# Patient Record
Sex: FEMALE | Race: WHITE | NOT HISPANIC OR LATINO | Employment: OTHER | URBAN - METROPOLITAN AREA
[De-identification: names, ages, dates, MRNs, and addresses within clinical notes are randomized per-mention and may not be internally consistent; named-entity substitution may affect disease eponyms.]

---

## 2017-02-06 ENCOUNTER — HISTORICAL (OUTPATIENT)
Dept: ADMINISTRATIVE | Facility: HOSPITAL | Age: 52
End: 2017-02-06

## 2017-02-06 LAB
% SATURATION: 5 %
FERRITIN SERPL-MCNC: 6 NG/ML (ref 24–162)
IRON: 22 MCG/DL (ref 24–162)
TOTAL IRON BINDING CAPACITY: 414 MCG/DL (ref 177–435)

## 2017-02-13 LAB — BCS RECOMMENDATION EXT: NORMAL

## 2017-02-24 ENCOUNTER — HISTORICAL (OUTPATIENT)
Dept: ADMINISTRATIVE | Facility: HOSPITAL | Age: 52
End: 2017-02-24

## 2017-04-13 ENCOUNTER — HISTORICAL (OUTPATIENT)
Dept: ADMINISTRATIVE | Facility: HOSPITAL | Age: 52
End: 2017-04-13

## 2017-04-13 LAB
% SATURATION: 13 %
ALBUMIN SERPL-MCNC: 3.8 G/DL (ref 3.1–4.7)
ALP SERPL-CCNC: 60 IU/L (ref 40–104)
ALT (SGPT): 14 IU/L (ref 3–33)
AST SERPL-CCNC: 26 IU/L (ref 10–40)
BASOPHILS NFR BLD: 0.1 K/UL (ref 0–0.2)
BASOPHILS NFR BLD: 0.8 %
BILIRUB SERPL-MCNC: 0.8 MG/DL (ref 0.3–1)
BUN SERPL-MCNC: 15 MG/DL (ref 8–20)
CALCIUM SERPL-MCNC: 9 MG/DL (ref 7.7–10.4)
CHLORIDE: 102 MMOL/L (ref 98–110)
CO2 SERPL-SCNC: 26.6 MMOL/L (ref 22.8–31.6)
CREATININE: 1.02 MG/DL (ref 0.6–1.4)
EOSINOPHIL NFR BLD: 0.3 K/UL (ref 0–0.7)
EOSINOPHIL NFR BLD: 4 %
ERYTHROCYTE [DISTWIDTH] IN BLOOD BY AUTOMATED COUNT: 20.3 % (ref 12.5–14.5)
FERRITIN SERPL-MCNC: 59 NG/ML (ref 24–162)
GLUCOSE: 101 MG/DL (ref 70–99)
GRAN #: 4.5 K/UL (ref 1.4–6.5)
GRAN%: 68 %
HCT VFR BLD AUTO: 39.1 % (ref 36–48)
HGB BLD-MCNC: 11.4 G/DL (ref 12–15)
IMMATURE GRANS (ABS): 0 K/UL (ref 0–1)
IMMATURE GRANULOCYTES: 0.5 %
IRON: 40 MCG/DL (ref 24–162)
LYMPH #: 1.2 K/UL (ref 1.2–3.4)
LYMPH%: 18.2 %
MCH RBC QN AUTO: 22.7 PG (ref 25–35)
MCHC RBC AUTO-ENTMCNC: 29.2 G/DL (ref 31–36)
MCV RBC AUTO: 77.7 FL (ref 79–98)
MONO #: 0.6 K/UL (ref 0.1–0.6)
MONO%: 8.5 %
NUCLEATED RBCS: 0 %
NUCLEATED RED BLOOD CELLS: 0 /100 WBC
PERFORMED BY:: ABNORMAL
PLATELET # BLD AUTO: 185 K/UL (ref 140–440)
PMV BLD AUTO: 11.1 FL (ref 8.8–12.7)
POTASSIUM SERPL-SCNC: 4.2 MMOL/L (ref 3.5–5)
PROT SERPL-MCNC: 6.8 G/DL (ref 6–8.2)
RBC # BLD AUTO: 5.03 M/UL (ref 3.5–5.5)
SODIUM: 137 MMOL/L (ref 134–144)
TOTAL IRON BINDING CAPACITY: 315 MCG/DL (ref 177–435)
WBC # BLD: 6.6 K/UL (ref 5–10)

## 2017-05-04 DIAGNOSIS — D50.0 IRON DEFICIENCY ANEMIA DUE TO CHRONIC BLOOD LOSS: ICD-10-CM

## 2017-05-04 DIAGNOSIS — D50.0 IRON DEFICIENCY ANEMIA SECONDARY TO BLOOD LOSS (CHRONIC): ICD-10-CM

## 2017-05-04 RX ORDER — SODIUM CHLORIDE 0.9 % (FLUSH) 0.9 %
10 SYRINGE (ML) INJECTION
Status: CANCELLED | OUTPATIENT
Start: 2017-05-11

## 2017-05-04 RX ORDER — HEPARIN 100 UNIT/ML
500 SYRINGE INTRAVENOUS
Status: CANCELLED | OUTPATIENT
Start: 2017-05-11

## 2017-05-12 RX ORDER — DIPHENHYDRAMINE HYDROCHLORIDE 50 MG/ML
25 INJECTION INTRAMUSCULAR; INTRAVENOUS
Status: CANCELLED
Start: 2017-05-12

## 2017-05-31 RX ORDER — FERROUS SULFATE 325(65) MG
325 TABLET ORAL
COMMUNITY
End: 2018-12-06

## 2017-05-31 RX ORDER — ALBUTEROL SULFATE 90 UG/1
2 AEROSOL, METERED RESPIRATORY (INHALATION) EVERY 6 HOURS PRN
COMMUNITY
End: 2017-10-10

## 2017-05-31 RX ORDER — GABAPENTIN 300 MG/1
300 CAPSULE ORAL DAILY
COMMUNITY
End: 2017-10-10

## 2017-05-31 RX ORDER — CLONIDINE HYDROCHLORIDE 0.1 MG/1
0.1 TABLET ORAL 3 TIMES DAILY
COMMUNITY

## 2017-05-31 RX ORDER — TEMAZEPAM 30 MG/1
30 CAPSULE ORAL NIGHTLY PRN
COMMUNITY
End: 2017-10-10

## 2017-05-31 RX ORDER — AMLODIPINE BESYLATE 10 MG/1
10 TABLET ORAL DAILY
COMMUNITY
End: 2020-10-27 | Stop reason: SDUPTHER

## 2017-06-05 LAB — CRC RECOMMENDATION EXT: NORMAL

## 2017-06-21 ENCOUNTER — OFFICE VISIT (OUTPATIENT)
Dept: HEMATOLOGY/ONCOLOGY | Facility: CLINIC | Age: 52
End: 2017-06-21
Payer: MEDICAID

## 2017-06-21 ENCOUNTER — HISTORICAL (OUTPATIENT)
Dept: ADMINISTRATIVE | Facility: HOSPITAL | Age: 52
End: 2017-06-21

## 2017-06-21 VITALS
SYSTOLIC BLOOD PRESSURE: 163 MMHG | TEMPERATURE: 98 F | DIASTOLIC BLOOD PRESSURE: 134 MMHG | HEART RATE: 94 BPM | RESPIRATION RATE: 18 BRPM | BODY MASS INDEX: 37.92 KG/M2 | WEIGHT: 249.38 LBS

## 2017-06-21 DIAGNOSIS — D50.0 IRON DEFICIENCY ANEMIA SECONDARY TO BLOOD LOSS (CHRONIC): ICD-10-CM

## 2017-06-21 DIAGNOSIS — D50.0 IRON DEFICIENCY ANEMIA DUE TO CHRONIC BLOOD LOSS: Primary | ICD-10-CM

## 2017-06-21 LAB
% SATURATION: 22 %
ALBUMIN SERPL-MCNC: 3.9 G/DL (ref 3.1–4.7)
ALP SERPL-CCNC: 68 IU/L (ref 40–104)
ALT (SGPT): 29 IU/L (ref 3–33)
AST SERPL-CCNC: 26 IU/L (ref 10–40)
BASOPHILS NFR BLD: 0.1 K/UL (ref 0–0.2)
BASOPHILS NFR BLD: 0.9 %
BILIRUB SERPL-MCNC: 0.4 MG/DL (ref 0.3–1)
BUN SERPL-MCNC: 12 MG/DL (ref 8–20)
CALCIUM SERPL-MCNC: 9.6 MG/DL (ref 7.7–10.4)
CHLORIDE: 103 MMOL/L (ref 98–110)
CO2 SERPL-SCNC: 27.5 MMOL/L (ref 22.8–31.6)
CREATININE: 1.05 MG/DL (ref 0.6–1.4)
EOSINOPHIL NFR BLD: 0.2 K/UL (ref 0–0.7)
EOSINOPHIL NFR BLD: 4.4 %
ERYTHROCYTE [DISTWIDTH] IN BLOOD BY AUTOMATED COUNT: 16.9 % (ref 12.5–14.5)
FERRITIN SERPL-MCNC: 70 NG/ML (ref 24–162)
GLUCOSE: 114 MG/DL (ref 70–99)
GRAN #: 3.4 K/UL (ref 1.4–6.5)
GRAN%: 62.4 %
HCT VFR BLD AUTO: 44.4 % (ref 36–48)
HGB BLD-MCNC: 14.1 G/DL (ref 12–15)
IMMATURE GRANS (ABS): 0 K/UL (ref 0–1)
IMMATURE GRANULOCYTES: 0.2 %
IRON: 60 MCG/DL (ref 24–162)
LYMPH #: 1.3 K/UL (ref 1.2–3.4)
LYMPH%: 23.2 %
MCH RBC QN AUTO: 26.4 PG (ref 25–35)
MCHC RBC AUTO-ENTMCNC: 31.8 G/DL (ref 31–36)
MCV RBC AUTO: 83.1 FL (ref 79–98)
MONO #: 0.5 K/UL (ref 0.1–0.6)
MONO%: 8.9 %
NUCLEATED RBCS: 0 %
NUCLEATED RED BLOOD CELLS: 0 /100 WBC
PERFORMED BY:: ABNORMAL
PLATELET # BLD AUTO: 188 K/UL (ref 140–440)
PMV BLD AUTO: 9.1 FL (ref 8.8–12.7)
POTASSIUM SERPL-SCNC: 3.9 MMOL/L (ref 3.5–5)
PROT SERPL-MCNC: 7.3 G/DL (ref 6–8.2)
RBC # BLD AUTO: 5.34 M/UL (ref 3.5–5.5)
SODIUM: 140 MMOL/L (ref 134–144)
TOTAL IRON BINDING CAPACITY: 276 MCG/DL (ref 177–435)
WBC # BLD: 5.5 K/UL (ref 5–10)

## 2017-06-21 PROCEDURE — 99213 OFFICE O/P EST LOW 20 MIN: CPT | Mod: ,,, | Performed by: INTERNAL MEDICINE

## 2017-06-21 RX ORDER — OMEPRAZOLE 10 MG/1
10 CAPSULE, DELAYED RELEASE ORAL DAILY
COMMUNITY
End: 2018-12-06

## 2017-06-21 NOTE — PROGRESS NOTES
[facility] Hematology/Oncology            PROGRESS NOTE      Subjective:       Patient ID:   NAME: Martita San : 1965     51 y.o. female    Referring Doc: Benjamin Okeefe MD  Other Physicians: Jared Schneider Ahmad    Chief Complaint:  Anemia f/u    History of Present Illness:     Patient returns today for a regularly scheduled follow-up visit.  The patient has endoscopies with Dr Schneider and i need the reports still. She is still having some fatigue. No bleeding or BRBPR. No CP, SOB, Ha's or current V. Mild occasional nausea            ROS:   GEN: normal without any fever, night sweats or weight loss  HEENT: normal with no HA's, sore throat, stiff neck, changes in vision  CV: normal with no CP, SOB, PND, ANTON or orthopnea  PULM: normal with no SOB, cough, hemoptysis, sputum or pleuritic pain  GI: normal with no abdominal pain, nausea, vomiting, constipation, diarrhea, melanotic stools, BRBPR, or hematemesis  : normal with no hematuria, dysuria  BREAST: normal with no mass, discharge, pain  SKIN: normal with no rash, erythema, bruising, or swelling    Allergies:  Review of patient's allergies indicates:   Allergen Reactions    Pcn [penicillins] Anaphylaxis    Benadryl allergy decongestant     Influenza virus vaccines     Latex, natural rubber Hives    Oxycodone        Medications:    Current Outpatient Prescriptions:     alprazolam (XANAX) 1 MG tablet, Take 1 mg by mouth 3 (three) times daily as needed for Anxiety., Disp: , Rfl:     amlodipine (NORVASC) 5 MG tablet, Take 5 mg by mouth once daily., Disp: , Rfl:     apixaban (ELIQUIS) 5 mg Tab, Take 5 mg by mouth 2 (two) times daily., Disp: , Rfl:     cloNIDine (CATAPRES) 0.1 MG tablet, Take 0.1 mg by mouth 3 (three) times daily., Disp: , Rfl:     lisinopril (PRINIVIL,ZESTRIL) 30 MG tablet, Take 1.5 tablets (45 mg total) by mouth once daily., Disp: 30 tablet, Rfl: 0    metoprolol succinate (TOPROL-XL) 50 MG 24 hr tablet, Take 50 mg by mouth  every evening. , Disp: , Rfl:     omeprazole (PRILOSEC) 10 MG capsule, Take 10 mg by mouth once daily., Disp: , Rfl:     albuterol 90 mcg/actuation inhaler, Inhale 2 puffs into the lungs every 6 (six) hours as needed for Wheezing. Rescue, Disp: , Rfl:     escitalopram oxalate (LEXAPRO) 20 MG tablet, Take 20 mg by mouth once daily., Disp: , Rfl:     ferrous sulfate 325 mg (65 mg iron) Tab tablet, Take 325 mg by mouth daily with breakfast., Disp: , Rfl:     gabapentin (NEURONTIN) 300 MG capsule, Take 300 mg by mouth Daily., Disp: , Rfl:     temazepam (RESTORIL) 30 mg capsule, Take 30 mg by mouth nightly as needed for Insomnia., Disp: , Rfl:     PMHx/PSHx Updates:  See patient's last visit with me on  4/13/17        .  See H&P on 3/17/17      Pathology:  None          Objective:     Vitals:  Blood pressure (!) 163/134, pulse 94, temperature 98.1 °F (36.7 °C), resp. rate 18, weight 113.1 kg (249 lb 6.4 oz).    Physical Examination:   GEN: no apparent distress, comfortable; AAOx3  HEAD: atraumatic and normocephalic  EYES: no pallor, no icterus, PERRLA  ENT: OMM, no pharyngeal erythema, external ears WNL; no nasal discharge; no thrush  NECK: no masses, thyroid normal, trachea midline, no LAD/LN's, supple  CV: RRR with no murmur; normal pulse; normal S1 and S2; no pedal edema  CHEST: Normal respiratory effort; CTAB; normal breath sounds; no wheeze or crackles  ABDOM: nontender and nondistended; soft; normal bowel sounds; no rebound/guarding  MUSC/Skeletal: ROM normal; no crepitus; joints normal; no deformities or arthropathy  EXTREM: no clubbing, cyanosis, inflammation or swelling  SKIN: no rashes, lesions, ulcers, petechiae or subcutaneous nodules  : no spivey  NEURO: grossly intact; motor/sensory WNL; AAOx3; no tremors  PSYCH: normal mood, affect and behavior  LYMPH: normal cervical, supraclavicular, axillary and groin LN's            Labs:   Lab Results   Component Value Date    WBC 6.6 04/13/2017    HGB 11.4 (L)  04/13/2017    HCT 39.1 04/13/2017    MCV 73 (L) 12/28/2016     04/13/2017    CMP  Sodium   Date Value Ref Range Status   04/13/2017 137 134 - 144 mmol/L      Potassium   Date Value Ref Range Status   04/13/2017 4.2 3.5 - 5.0 mmol/L      Chloride   Date Value Ref Range Status   04/13/2017 102 98 - 110 mmol/L      CO2   Date Value Ref Range Status   04/13/2017 26.6 22.8 - 31.6 mmol/L      Glucose   Date Value Ref Range Status   04/13/2017 101 (H) 70 - 99 mg/dL      BUN, Bld   Date Value Ref Range Status   04/13/2017 15 8 - 20 mg/dL      Creatinine   Date Value Ref Range Status   04/13/2017 1.02 0.60 - 1.40 mg/dL      Calcium   Date Value Ref Range Status   04/13/2017 9.0 7.7 - 10.4 mg/dL      Total Protein   Date Value Ref Range Status   04/13/2017 6.8 6.0 - 8.2 g/dL      Albumin   Date Value Ref Range Status   04/13/2017 3.8 3.1 - 4.7 g/dL      Total Bilirubin   Date Value Ref Range Status   04/13/2017 0.8 0.3 - 1.0 mg/dL      Alkaline Phosphatase   Date Value Ref Range Status   04/13/2017 60 40 - 104 IU/L      AST   Date Value Ref Range Status   04/13/2017 26 10 - 40 IU/L      ALT   Date Value Ref Range Status   12/26/2016 15 10 - 44 U/L Final     Anion Gap   Date Value Ref Range Status   12/28/2016 7 (L) 8 - 16 mmol/L Final     eGFR if    Date Value Ref Range Status   12/28/2016 >60 >60 mL/min/1.73 m^2 Final     eGFR if non    Date Value Ref Range Status   12/28/2016 >60 >60 mL/min/1.73 m^2 Final     Comment:     Calculation used to obtain the estimated glomerular filtration  rate (eGFR) is the CKD-EPI equation. Since race is unknown   in our information system, the eGFR values for   -American and Non--American patients are given   for each creatinine result.       I have reviewed all available lab results and radiology reports.    Radiology/Diagnostic Studies:        Assessment/Plan:   (1) 51 y.o. female with diagnosis of microcytic anemia  - iron deficiency  anemia  - she had been on oral iron supplements previously and then received IV iron through May 2017  - she has been followed by Dr Schneider with GI  - s/p scopes with hiatal hernia, stomach ulcers and H.pylori positive  - planned capsule endoscopy per patient    (2) chronic pain syndrome with chronic neck and back issues  - followed by Dr Loco with Pain management    (3) HTN - followed by her PCP    (4) fibromyalgia and arthritis - followed by Dr Coleman with Rheum    (5) prior alcohol abuse issues      PLAN:  1. Need the latest endoscopy reports from Dr Schneider  2. She is on prilosec now per GI  3. Planned capsule endoscopy in near future  3. Check up to date labs and iron panel  4. Check labs monthly  5. RTc 3 months  Fax note to Sary Schneider Sedrish, Ahmad      Discussion:     I have explained all of the above in detail and the patient understands all of the current recommendation(s). I have answered all of their questions to the best of my ability and to their complete satisfaction.   The patient is to continue with the current management plan.    RTC in  3 months        Electronically signed by Sukhdeep Rocha MD

## 2017-06-21 NOTE — LETTER
June 21, 2017      Benjamin Okeefe MD  [address] [facility]  [address]  [place] [address]           [facility] Hematology Oncology  [address] [facility] [address]  [address]  [place] [address]  Phone: [phone]  Fax: [phone]          Patient: Martita San   MR Number: [id number]   YOB: 1965   Date of Visit: 6/21/2017       Dear Dr. Benjamin Okeefe:    Thank you for referring Martita San to me for evaluation. Attached you will find relevant portions of my assessment and plan of care.    If you have questions, please do not hesitate to call me. I look forward to following Martita San along with you.    Sincerely,    Sukhdeep Rocha MD    Enclosure  CC:  No Recipients    If you would like to receive this communication electronically, please contact [email or web address] or [phone] to request more information on [employer] access.    For providers and/or their staff who would like to refer a patient to [facility], please contact us through our one-stop-shop provider referral line, [facility] [place], at [phone].    If you feel you have received this communication in error or would no longer like to receive these types of communications, please e-mail [email or web address]

## 2017-06-23 ENCOUNTER — TELEPHONE (OUTPATIENT)
Dept: HEMATOLOGY/ONCOLOGY | Facility: CLINIC | Age: 52
End: 2017-06-23

## 2017-06-23 NOTE — TELEPHONE ENCOUNTER
----- Message from Sukhdeep Rocha MD sent at 6/23/2017  7:38 AM CDT -----  Make sure she has follow-up with Dr Okeefe about her BP      ----- Message -----  From: Reji Coleman MD  Sent: 6/22/2017   9:29 AM  To: Sukhdeep Rocha MD, Benjamin Okeefe MD, #    I got a copy of the note yesterday from this patient having seen Dr. Rocha. Her blood pressure was extremely high and it wasn't certain anything was planned for BP f/u.  Please call the patient and ask her how she is feeling and in addition ask her to contact her primary care doctor today see if he can see her quickly.  Thanks.

## 2017-06-23 NOTE — TELEPHONE ENCOUNTER
Pt notified that she should be receiving a call from Dr Okeefe's office to Schedule a BP check f/u. Pt states she has already been contacted and will be taking care of.

## 2017-06-26 ENCOUNTER — TELEPHONE (OUTPATIENT)
Dept: RHEUMATOLOGY | Facility: CLINIC | Age: 52
End: 2017-06-26

## 2017-06-26 NOTE — TELEPHONE ENCOUNTER
----- Message from Reji Coleman MD sent at 6/22/2017  9:29 AM CDT -----  I got a copy of the note yesterday from this patient having seen Dr. Rocha. Her blood pressure was extremely high and it wasn't certain anything was planned for BP f/u.  Please call the patient and ask her how she is feeling and in addition ask her to contact her primary care doctor today see if he can see her quickly.  Thanks.

## 2017-07-17 LAB
% SATURATION: 17 %
ALBUMIN SERPL-MCNC: 4.1 G/DL (ref 3.1–4.7)
ALP SERPL-CCNC: 56 IU/L (ref 40–104)
ALT (SGPT): 38 IU/L (ref 3–33)
AST SERPL-CCNC: 30 IU/L (ref 10–40)
BASOPHILS NFR BLD: 0.1 K/UL (ref 0–0.2)
BASOPHILS NFR BLD: 0.9 %
BILIRUB SERPL-MCNC: 0.6 MG/DL (ref 0.3–1)
BUN SERPL-MCNC: 24 MG/DL (ref 8–20)
CALCIUM SERPL-MCNC: 9.4 MG/DL (ref 7.7–10.4)
CHLORIDE: 103 MMOL/L (ref 98–110)
CO2 SERPL-SCNC: 29.6 MMOL/L (ref 22.8–31.6)
CREATININE: 0.99 MG/DL (ref 0.6–1.4)
EOSINOPHIL NFR BLD: 0.2 K/UL (ref 0–0.7)
EOSINOPHIL NFR BLD: 3.2 %
ERYTHROCYTE [DISTWIDTH] IN BLOOD BY AUTOMATED COUNT: 15.5 % (ref 12.5–14.5)
FERRITIN SERPL-MCNC: 57 NG/ML (ref 24–162)
GLUCOSE: 103 MG/DL (ref 70–99)
GRAN #: 4.3 K/UL (ref 1.4–6.5)
GRAN%: 65.5 %
HCT VFR BLD AUTO: 40.3 % (ref 36–48)
HGB BLD-MCNC: 13.2 G/DL (ref 12–15)
IMMATURE GRANS (ABS): 0 K/UL (ref 0–1)
IMMATURE GRANULOCYTES: 0.3 %
IRON: 49 MCG/DL (ref 24–162)
LYMPH #: 1.4 K/UL (ref 1.2–3.4)
LYMPH%: 21.3 %
MCH RBC QN AUTO: 28 PG (ref 25–35)
MCHC RBC AUTO-ENTMCNC: 32.8 G/DL (ref 31–36)
MCV RBC AUTO: 85.4 FL (ref 79–98)
MONO #: 0.6 K/UL (ref 0.1–0.6)
MONO%: 8.8 %
NUCLEATED RBCS: 0 %
NUCLEATED RED BLOOD CELLS: 0 /100 WBC
PERFORMED BY:: ABNORMAL
PLATELET # BLD AUTO: 236 K/UL (ref 140–440)
PMV BLD AUTO: 9.4 FL (ref 8.8–12.7)
POTASSIUM SERPL-SCNC: 3.8 MMOL/L (ref 3.5–5)
PROT SERPL-MCNC: 7.2 G/DL (ref 6–8.2)
RBC # BLD AUTO: 4.72 M/UL (ref 3.5–5.5)
SODIUM: 140 MMOL/L (ref 134–144)
TOTAL IRON BINDING CAPACITY: 288 MCG/DL (ref 177–435)
WBC # BLD: 6.6 K/UL (ref 5–10)

## 2017-07-19 ENCOUNTER — TELEPHONE (OUTPATIENT)
Dept: HEMATOLOGY/ONCOLOGY | Facility: CLINIC | Age: 52
End: 2017-07-19

## 2017-07-19 NOTE — TELEPHONE ENCOUNTER
----- Message from Corrie Johnson sent at 7/19/2017 10:30 AM CDT -----  Had BW done Monday Next Door. It is in the lab tab of her chart. Please call patient with results at [phone]. Thanks

## 2017-08-23 ENCOUNTER — HISTORICAL (OUTPATIENT)
Dept: ADMINISTRATIVE | Facility: HOSPITAL | Age: 52
End: 2017-08-23

## 2017-08-23 LAB
% SATURATION: 15 %
ALBUMIN SERPL-MCNC: 4.2 G/DL (ref 3.1–4.7)
ALP SERPL-CCNC: 57 IU/L (ref 40–104)
ALT (SGPT): 27 IU/L (ref 3–33)
AST SERPL-CCNC: 27 IU/L (ref 10–40)
BASOPHILS NFR BLD: 0 K/UL (ref 0–0.2)
BASOPHILS NFR BLD: 0.7 %
BILIRUB SERPL-MCNC: 0.8 MG/DL (ref 0.3–1)
BUN SERPL-MCNC: 14 MG/DL (ref 8–20)
CALCIUM SERPL-MCNC: 9.6 MG/DL (ref 7.7–10.4)
CHLORIDE: 101 MMOL/L (ref 98–110)
CO2 SERPL-SCNC: 30.8 MMOL/L (ref 22.8–31.6)
CREATININE: 0.97 MG/DL (ref 0.6–1.4)
EOSINOPHIL NFR BLD: 0.2 K/UL (ref 0–0.7)
EOSINOPHIL NFR BLD: 3.9 %
ERYTHROCYTE [DISTWIDTH] IN BLOOD BY AUTOMATED COUNT: 13.2 % (ref 12.5–14.5)
FERRITIN SERPL-MCNC: 40 NG/ML (ref 24–162)
GLUCOSE: 91 MG/DL (ref 70–99)
GRAN #: 3.7 K/UL (ref 1.4–6.5)
GRAN%: 65.7 %
HCT VFR BLD AUTO: 42.3 % (ref 36–48)
HGB BLD-MCNC: 13.6 G/DL (ref 12–15)
IMMATURE GRANS (ABS): 0 K/UL (ref 0–1)
IMMATURE GRANULOCYTES: 0.4 %
IRON: 44 MCG/DL (ref 24–162)
LYMPH #: 1.2 K/UL (ref 1.2–3.4)
LYMPH%: 21.5 %
MCH RBC QN AUTO: 28.3 PG (ref 25–35)
MCHC RBC AUTO-ENTMCNC: 32.2 G/DL (ref 31–36)
MCV RBC AUTO: 87.9 FL (ref 79–98)
MONO #: 0.4 K/UL (ref 0.1–0.6)
MONO%: 7.8 %
NUCLEATED RBCS: 0 %
NUCLEATED RED BLOOD CELLS: 0 /100 WBC
PERFORMED BY:: NORMAL
PLATELET # BLD AUTO: 219 K/UL (ref 140–440)
PMV BLD AUTO: 9.3 FL (ref 8.8–12.7)
POTASSIUM SERPL-SCNC: 3.8 MMOL/L (ref 3.5–5)
PROT SERPL-MCNC: 7.3 G/DL (ref 6–8.2)
RBC # BLD AUTO: 4.81 M/UL (ref 3.5–5.5)
SODIUM: 139 MMOL/L (ref 134–144)
TOTAL IRON BINDING CAPACITY: 298 MCG/DL (ref 177–435)
WBC # BLD: 5.6 K/UL (ref 5–10)

## 2017-08-28 ENCOUNTER — TELEPHONE (OUTPATIENT)
Dept: HEMATOLOGY/ONCOLOGY | Facility: CLINIC | Age: 52
End: 2017-08-28

## 2017-08-28 NOTE — TELEPHONE ENCOUNTER
Spoke to patient with lab results. Sees us on 9/20/17 and will check labs again before that visit. Told her iron levels are slightly lower than last time but are still within normal limits

## 2017-09-19 LAB
ALBUMIN SERPL-MCNC: 4.2 G/DL (ref 3.1–4.7)
ALP SERPL-CCNC: 64 IU/L (ref 40–104)
ALT (SGPT): 34 IU/L (ref 3–33)
AST SERPL-CCNC: 30 IU/L (ref 10–40)
BASOPHILS NFR BLD: 0 K/UL (ref 0–0.2)
BASOPHILS NFR BLD: 0.2 %
BILIRUB SERPL-MCNC: 0.5 MG/DL (ref 0.3–1)
BUN SERPL-MCNC: 18 MG/DL (ref 8–20)
CALCIUM SERPL-MCNC: 9.5 MG/DL (ref 7.7–10.4)
CHLORIDE: 106 MMOL/L (ref 98–110)
CO2 SERPL-SCNC: 26 MMOL/L (ref 22.8–31.6)
CREATININE: 0.96 MG/DL (ref 0.6–1.4)
EOSINOPHIL NFR BLD: 0 %
EOSINOPHIL NFR BLD: 0 K/UL (ref 0–0.7)
ERYTHROCYTE [DISTWIDTH] IN BLOOD BY AUTOMATED COUNT: 12.8 % (ref 12.5–14.5)
GLUCOSE: 133 MG/DL (ref 70–99)
GRAN #: 11.6 K/UL (ref 1.4–6.5)
GRAN%: 88.1 %
HCT VFR BLD AUTO: 42.3 % (ref 36–48)
HGB BLD-MCNC: 14 G/DL (ref 12–15)
IMMATURE GRANS (ABS): 0.1 K/UL (ref 0–1)
IMMATURE GRANULOCYTES: 0.8 %
LYMPH #: 0.9 K/UL (ref 1.2–3.4)
LYMPH%: 7.2 %
MCH RBC QN AUTO: 28.2 PG (ref 25–35)
MCHC RBC AUTO-ENTMCNC: 33.1 G/DL (ref 31–36)
MCV RBC AUTO: 85.3 FL (ref 79–98)
MONO #: 0.5 K/UL (ref 0.1–0.6)
MONO%: 3.7 %
NUCLEATED RBCS: 0 %
NUCLEATED RED BLOOD CELLS: 0 /100 WBC
PERFORMED BY:: ABNORMAL
PLATELET # BLD AUTO: 238 K/UL (ref 140–440)
PMV BLD AUTO: 9.5 FL (ref 8.8–12.7)
POTASSIUM SERPL-SCNC: 4 MMOL/L (ref 3.5–5)
PROT SERPL-MCNC: 7.3 G/DL (ref 6–8.2)
RBC # BLD AUTO: 4.96 M/UL (ref 3.5–5.5)
SODIUM: 142 MMOL/L (ref 134–144)
WBC # BLD: 13.1 K/UL (ref 5–10)

## 2017-09-20 ENCOUNTER — OFFICE VISIT (OUTPATIENT)
Dept: HEMATOLOGY/ONCOLOGY | Facility: CLINIC | Age: 52
End: 2017-09-20
Payer: MEDICAID

## 2017-09-20 VITALS
RESPIRATION RATE: 18 BRPM | DIASTOLIC BLOOD PRESSURE: 93 MMHG | TEMPERATURE: 98 F | SYSTOLIC BLOOD PRESSURE: 147 MMHG | BODY MASS INDEX: 36.33 KG/M2 | HEART RATE: 79 BPM | HEIGHT: 68 IN | WEIGHT: 239.69 LBS

## 2017-09-20 DIAGNOSIS — D50.9 HYPOCHROMIC MICROCYTIC ANEMIA: ICD-10-CM

## 2017-09-20 DIAGNOSIS — D50.0 IRON DEFICIENCY ANEMIA SECONDARY TO BLOOD LOSS (CHRONIC): ICD-10-CM

## 2017-09-20 DIAGNOSIS — D50.0 IRON DEFICIENCY ANEMIA DUE TO CHRONIC BLOOD LOSS: Primary | ICD-10-CM

## 2017-09-20 PROCEDURE — 3008F BODY MASS INDEX DOCD: CPT | Mod: ,,, | Performed by: INTERNAL MEDICINE

## 2017-09-20 PROCEDURE — 3077F SYST BP >= 140 MM HG: CPT | Mod: ,,, | Performed by: INTERNAL MEDICINE

## 2017-09-20 PROCEDURE — 3080F DIAST BP >= 90 MM HG: CPT | Mod: ,,, | Performed by: INTERNAL MEDICINE

## 2017-09-20 PROCEDURE — 99213 OFFICE O/P EST LOW 20 MIN: CPT | Mod: ,,, | Performed by: INTERNAL MEDICINE

## 2017-09-20 RX ORDER — HYDROXYZINE PAMOATE 25 MG/1
25 CAPSULE ORAL 4 TIMES DAILY
COMMUNITY
End: 2018-12-06

## 2017-09-20 RX ORDER — PREDNISONE 20 MG/1
20 TABLET ORAL
COMMUNITY
End: 2017-10-10 | Stop reason: ALTCHOICE

## 2017-09-20 NOTE — PROGRESS NOTES
[facility] Hematology/Oncology  PROGRESS NOTE      Subjective:       Patient ID:   NAME: Martita San : 1965     51 y.o. female    Referring Doc: Benjamin Okeefe MD  Other Physicians: Jared Schneider Ahmad    Chief Complaint:  Follow-up and Results (labs in epic)      History of Present Illness:     Patient returns today for a regularly scheduled follow-up visit.  The patient has developed circular red lesions on arms and legs for the past two weeks. She sees Dr Coleman in near future. She denies any CP, SOB, HA's or N/V.             ROS:   GEN: normal without any fever, night sweats or weight loss  HEENT: normal with no HA's, sore throat, stiff neck, changes in vision  CV: normal with no CP, SOB, PND, ANTON or orthopnea  PULM: normal with no SOB, cough, hemoptysis, sputum or pleuritic pain  GI: normal with no abdominal pain, nausea, vomiting, constipation, diarrhea, melanotic stools, BRBPR, or hematemesis  : normal with no hematuria, dysuria  BREAST: normal with no mass, discharge, pain  SKIN: normal with no rash, erythema, bruising, or swelling    Allergies:  Review of patient's allergies indicates:   Allergen Reactions    Pcn [penicillins] Anaphylaxis    Benadryl allergy decongestant     Clindamycin Hives    Influenza virus vaccines     Latex, natural rubber Hives    Oxycodone        Medications:    Current Outpatient Prescriptions:     albuterol 90 mcg/actuation inhaler, Inhale 2 puffs into the lungs every 6 (six) hours as needed for Wheezing. Rescue, Disp: , Rfl:     alprazolam (XANAX) 1 MG tablet, Take 1 mg by mouth 3 (three) times daily as needed for Anxiety., Disp: , Rfl:     amlodipine (NORVASC) 5 MG tablet, Take 5 mg by mouth once daily., Disp: , Rfl:     apixaban (ELIQUIS) 5 mg Tab, Take 5 mg by mouth 2 (two) times daily., Disp: , Rfl:     cloNIDine (CATAPRES) 0.1 MG tablet, Take 0.1 mg by mouth 3 (three) times daily., Disp: , Rfl:     escitalopram oxalate (LEXAPRO) 20 MG tablet, Take  "20 mg by mouth once daily., Disp: , Rfl:     ferrous sulfate 325 mg (65 mg iron) Tab tablet, Take 325 mg by mouth daily with breakfast., Disp: , Rfl:     gabapentin (NEURONTIN) 300 MG capsule, Take 300 mg by mouth Daily., Disp: , Rfl:     hydrOXYzine pamoate (VISTARIL) 25 MG Cap, Take 25 mg by mouth 4 (four) times daily., Disp: , Rfl:     lisinopril (PRINIVIL,ZESTRIL) 30 MG tablet, Take 1.5 tablets (45 mg total) by mouth once daily., Disp: 30 tablet, Rfl: 0    metoprolol succinate (TOPROL-XL) 50 MG 24 hr tablet, Take 50 mg by mouth every evening. , Disp: , Rfl:     omeprazole (PRILOSEC) 10 MG capsule, Take 10 mg by mouth once daily., Disp: , Rfl:     predniSONE (DELTASONE) 20 MG tablet, Take 20 mg by mouth., Disp: , Rfl:     temazepam (RESTORIL) 30 mg capsule, Take 30 mg by mouth nightly as needed for Insomnia., Disp: , Rfl:     PMHx/PSHx Updates:  See patient's last visit with me on 6/21/2017.  See H&P on 3/17/2017        Pathology:  No matching staging information was found for the patient.          Objective:     Vitals:  Blood pressure (!) 147/93, pulse 79, temperature 98.3 °F (36.8 °C), resp. rate 18, height 5' 8" (1.727 m), weight 108.7 kg (239 lb 11.2 oz).    Physical Examination:   GEN: no apparent distress, comfortable; AAOx3  HEAD: atraumatic and normocephalic  EYES: no pallor, no icterus, PERRLA  ENT: OMM, no pharyngeal erythema, external ears WNL; no nasal discharge; no thrush  NECK: no masses, thyroid normal, trachea midline, no LAD/LN's, supple  CV: RRR with no murmur; normal pulse; normal S1 and S2; no pedal edema  CHEST: Normal respiratory effort; CTAB; normal breath sounds; no wheeze or crackles  ABDOM: nontender and nondistended; soft; normal bowel sounds; no rebound/guarding  MUSC/Skeletal: ROM normal; no crepitus; joints normal; no deformities or arthropathy  EXTREM: no clubbing, cyanosis, inflammation or swelling  SKIN: circular red puritic rashes with red raised edges,: no " allyssa  NEURO: grossly intact; motor/sensory WNL; AAOx3; no tremors  PSYCH: normal mood, affect and behavior  LYMPH: normal cervical, supraclavicular, axillary and groin LN's            Labs:   9/19/2017  Lab Results   Component Value Date    WBC 13.1 (H) 09/19/2017    HGB 14.0 09/19/2017    HCT 42.3 09/19/2017    MCV 73 (L) 12/28/2016     09/19/2017     BMP  Lab Results   Component Value Date     09/19/2017    K 4.0 09/19/2017     09/19/2017    CO2 26.0 09/19/2017    BUN 18 09/19/2017    CREATININE 0.96 09/19/2017    CALCIUM 9.5 09/19/2017    ANIONGAP 7 (L) 12/28/2016    ESTGFRAFRICA >60 12/28/2016    EGFRNONAA >60 12/28/2016     Lab Results   Component Value Date    PKWUIHBR89 489 12/27/2016     Lab Results   Component Value Date    FOLATE 8.5 12/27/2016     Lab Results   Component Value Date    IRON 44 08/23/2017    TIBC 298 08/23/2017    FERRITIN 40 08/23/2017               Radiology/Diagnostic Studies:    No results found.    I have reviewed all available lab results and radiology reports.    Assessment/Plan:   (1) 51 y.o. female with diagnosis of microcytic anemia  - iron deficiency anemia  - she had been on oral iron supplements previously and then received IV iron through May 2017  - she has been followed by Dr Schneider with GI  - s/p scopes with hiatal hernia, stomach ulcers and H.pylori positive  - capsule endoscopy per patient was fine  - latest hgb on 9/19 was 14.0 and good  - iron levels adequate currently     (2) chronic pain syndrome with chronic neck and back issues  - followed by Dr Loco with Pain management     (3) HTN - followed by her PCP     (4) fibromyalgia and arthritis - followed by Dr Coleman with Rheum     (5) prior alcohol abuse issues    (6) circular red raised edged rashes on legs and arms - needs to see dermatology  - she went ER at [facility] and they placed her on steroids        PLAN:  1. Refer to dermatology  2. continued on prilosec now per GI  3. Check labs every 3  months  4. MVI's  5. RTc 6 months  F/U with PCP, GI and Rheum  Fax note to Sary Schneider Sedrish, Ahmad       Discussion:     I have explained all of the above in detail and the patient understands all of the current recommendation(s). I have answered all of their questions to the best of my ability and to their complete satisfaction.   The patient is to continue with the current management plan.            Electronically signed by Sukhdeep Rocha MD

## 2017-09-20 NOTE — LETTER
September 20, 2017      Benjamin Okeefe MD  [address] [facility] [address] [facility]  [address]  [place] [address]           [facility] Hematology Oncology  [address] [facility] [facility] [facility]  [address]  [place] [address]  Phone: [phone]  Fax: [phone]          Patient: Martita San   MR Number: [id number]   YOB: 1965   Date of Visit: 9/20/2017       Dear Dr. Benjamin Okeefe:    Thank you for referring Martita San to me for evaluation. Attached you will find relevant portions of my assessment and plan of care.    If you have questions, please do not hesitate to call me. I look forward to following Martita San along with you.    Sincerely,    Sukhdeep Rocha MD    Enclosure  CC:  No Recipients    If you would like to receive this communication electronically, please contact [email or web address] or [phone] to request more information on [employer] access.    For providers and/or their staff who would like to refer a patient to [facility], please contact us through our one-stop-shop provider referral line, [facility], at [phone].    If you feel you have received this communication in error or would no longer like to receive these types of communications, please e-mail [email or web address]

## 2017-10-10 ENCOUNTER — OFFICE VISIT (OUTPATIENT)
Dept: RHEUMATOLOGY | Facility: CLINIC | Age: 52
End: 2017-10-10
Payer: MEDICAID

## 2017-10-10 VITALS
BODY MASS INDEX: 36.5 KG/M2 | DIASTOLIC BLOOD PRESSURE: 88 MMHG | WEIGHT: 240.81 LBS | HEIGHT: 68 IN | SYSTOLIC BLOOD PRESSURE: 148 MMHG

## 2017-10-10 DIAGNOSIS — M75.52 SUBACROMIAL BURSITIS OF LEFT SHOULDER JOINT: ICD-10-CM

## 2017-10-10 DIAGNOSIS — M79.7 FIBROMYALGIA: ICD-10-CM

## 2017-10-10 DIAGNOSIS — D50.9 HYPOCHROMIC MICROCYTIC ANEMIA: ICD-10-CM

## 2017-10-10 DIAGNOSIS — F41.8 MIXED ANXIETY DEPRESSIVE DISORDER: ICD-10-CM

## 2017-10-10 DIAGNOSIS — F51.01 PRIMARY INSOMNIA: Primary | ICD-10-CM

## 2017-10-10 PROCEDURE — 20610 DRAIN/INJ JOINT/BURSA W/O US: CPT | Mod: LT,,, | Performed by: INTERNAL MEDICINE

## 2017-10-10 PROCEDURE — 99214 OFFICE O/P EST MOD 30 MIN: CPT | Mod: 25,,, | Performed by: INTERNAL MEDICINE

## 2017-10-10 RX ORDER — TEMAZEPAM 30 MG/1
30 CAPSULE ORAL NIGHTLY PRN
Qty: 30 CAPSULE | Refills: 5 | Status: SHIPPED | OUTPATIENT
Start: 2017-10-10 | End: 2018-12-06

## 2017-10-10 RX ORDER — TRIAMCINOLONE ACETONIDE 40 MG/ML
40 INJECTION, SUSPENSION INTRA-ARTICULAR; INTRAMUSCULAR
Status: DISCONTINUED | OUTPATIENT
Start: 2017-10-10 | End: 2017-10-10 | Stop reason: HOSPADM

## 2017-10-10 RX ORDER — DICLOFENAC SODIUM 10 MG/G
2 GEL TOPICAL 4 TIMES DAILY
COMMUNITY
Start: 2016-08-22 | End: 2021-01-23 | Stop reason: SDUPTHER

## 2017-10-10 RX ORDER — DEXAMETHASONE SODIUM PHOSPHATE 4 MG/ML
2 INJECTION, SOLUTION INTRA-ARTICULAR; INTRALESIONAL; INTRAMUSCULAR; INTRAVENOUS; SOFT TISSUE
Status: DISCONTINUED | OUTPATIENT
Start: 2017-10-10 | End: 2017-10-10 | Stop reason: HOSPADM

## 2017-10-10 RX ORDER — ASPIRIN 325 MG
325 TABLET, DELAYED RELEASE (ENTERIC COATED) ORAL
COMMUNITY
Start: 2017-01-09 | End: 2018-12-06

## 2017-10-10 RX ADMIN — DEXAMETHASONE SODIUM PHOSPHATE 2 MG: 4 INJECTION, SOLUTION INTRA-ARTICULAR; INTRALESIONAL; INTRAMUSCULAR; INTRAVENOUS; SOFT TISSUE at 02:10

## 2017-10-10 RX ADMIN — TRIAMCINOLONE ACETONIDE 40 MG: 40 INJECTION, SUSPENSION INTRA-ARTICULAR; INTRAMUSCULAR at 02:10

## 2017-10-10 NOTE — PROCEDURES
Large Joint Aspiration/Injection  Date/Time: 10/10/2017 2:41 PM  Performed by: JOSE EDUARDO NEWSOME  Authorized by: JOSE EDUARDO NEWSOME     Consent Done?:  Yes (Verbal)  Indications:  Pain  Procedure site marked: Yes    Timeout: Prior to procedure the correct patient, procedure, and site was verified      Location:  Shoulder  Site:  L subacromial bursa  Prep: Patient was prepped and draped in usual sterile fashion    Ultrasonic Guidance for needle placement: No  Needle size:  22 G  Approach:  Lateral  Medications:  40 mg triamcinolone acetonide 40 mg/mL; 2 mg dexamethasone 4 mg/mL  Aspirate amount (ml):  0  Patient tolerance:  Patient tolerated the procedure well with no immediate complications

## 2017-10-19 ENCOUNTER — OFFICE VISIT (OUTPATIENT)
Dept: FAMILY MEDICINE | Facility: CLINIC | Age: 52
End: 2017-10-19
Payer: MEDICAID

## 2017-10-19 VITALS
SYSTOLIC BLOOD PRESSURE: 143 MMHG | TEMPERATURE: 98 F | RESPIRATION RATE: 18 BRPM | WEIGHT: 240.38 LBS | OXYGEN SATURATION: 98 % | HEART RATE: 72 BPM | BODY MASS INDEX: 36.55 KG/M2 | DIASTOLIC BLOOD PRESSURE: 80 MMHG

## 2017-10-19 DIAGNOSIS — L30.0 NUMMULAR DERMATITIS: ICD-10-CM

## 2017-10-19 DIAGNOSIS — Z13.1 SCREENING FOR DIABETES MELLITUS: ICD-10-CM

## 2017-10-19 DIAGNOSIS — F41.8 DEPRESSION WITH ANXIETY: ICD-10-CM

## 2017-10-19 DIAGNOSIS — I48.20 CHRONIC ATRIAL FIBRILLATION: ICD-10-CM

## 2017-10-19 DIAGNOSIS — K21.9 GASTROESOPHAGEAL REFLUX DISEASE WITHOUT ESOPHAGITIS: ICD-10-CM

## 2017-10-19 DIAGNOSIS — D50.0 IRON DEFICIENCY ANEMIA DUE TO CHRONIC BLOOD LOSS: ICD-10-CM

## 2017-10-19 DIAGNOSIS — Z13.220 SCREENING, LIPID: ICD-10-CM

## 2017-10-19 DIAGNOSIS — Z11.59 ENCOUNTER FOR HEPATITIS C SCREENING TEST FOR LOW RISK PATIENT: ICD-10-CM

## 2017-10-19 DIAGNOSIS — E89.41 HOT FLASHES DUE TO SURGICAL MENOPAUSE: ICD-10-CM

## 2017-10-19 DIAGNOSIS — M79.7 FIBROMYALGIA: Primary | ICD-10-CM

## 2017-10-19 DIAGNOSIS — I10 BENIGN ESSENTIAL HYPERTENSION: ICD-10-CM

## 2017-10-19 PROBLEM — I48.91 ATRIAL FIBRILLATION: Status: ACTIVE | Noted: 2017-10-19

## 2017-10-19 PROCEDURE — 99213 OFFICE O/P EST LOW 20 MIN: CPT | Mod: ,,, | Performed by: INTERNAL MEDICINE

## 2017-10-19 NOTE — ASSESSMENT & PLAN NOTE
Only 1 lesion currently, very non-specific appearance. Pt awaiting biopsy results from dermatology.

## 2017-10-19 NOTE — PROGRESS NOTES
"                                                NEW ADULT PATIENT NOTE    Subjective:     Chief Complaint:  Establish Care (complains of hormone issues)      History of Present Illness:   Martita San is a 51 y.o. female here to establish care with me.  Pt does not have any new complaints. She has been having an itchy rash for 6 weeks, each lesion stays for about 1 week and then goes away.  Round lesions that "bubble up" and then go away.  She saw derm who originally prescribed steroids. It did not go away, so a biopsy was recently done.  She does not have results yet.   Pt also c/o hot flashes since her total hysterectomy done 11/2016.        Past Medical History:   Diagnosis Date    Allergy     Anxiety     Carotid stenosis 08/2016    Chronic anemia     Depression     Fibromyalgia     Osteoarthritis     Pinched nerve     PTSD (post-traumatic stress disorder)     Tachycardia     Thyroid disease     Torn rotator cuff     Uterine fibroid 10/2016    Vertigo        Family History   Problem Relation Age of Onset    Arthritis Mother     Hypertension Mother     COPD Mother     Depression Mother     Arthritis Father     Depression Father     Hypertension Father     Heart disease Father     COPD Father     Heart attack Father     Asthma Father     Heart attack Maternal Grandmother     Cancer Maternal Grandmother     Hypertension Maternal Grandmother     Hypertension Paternal Grandmother     Stroke Paternal Grandmother     Tuberculosis Paternal Grandfather        Social History     Social History    Marital status: Single     Spouse name: N/A    Number of children: N/A    Years of education: N/A     Occupational History    Not on file.     Social History Main Topics    Smoking status: Never Smoker    Smokeless tobacco: Never Used    Alcohol use No    Drug use: No    Sexual activity: Not on file     Other Topics Concern    Not on file     Social History Narrative    No narrative on " file       ROS:  Review of Systems   Respiratory: Negative for cough and shortness of breath.    Cardiovascular: Negative for chest pain and palpitations.   Gastrointestinal: Negative for constipation and diarrhea.   Endocrine: Positive for heat intolerance. Negative for polydipsia, polyphagia and polyuria.   Musculoskeletal: Positive for back pain. Negative for joint swelling.   Skin: Positive for rash.          Current Outpatient Prescriptions:     amlodipine (NORVASC) 5 MG tablet, Take 5 mg by mouth once daily., Disp: , Rfl:     apixaban (ELIQUIS) 5 mg Tab, Take 5 mg by mouth 2 (two) times daily., Disp: , Rfl:     aspirin (ECOTRIN) 325 MG EC tablet, 325 mg., Disp: , Rfl:     cloNIDine (CATAPRES) 0.1 MG tablet, Take 0.1 mg by mouth 3 (three) times daily., Disp: , Rfl:     diclofenac sodium 1 % Gel, 2 g., Disp: , Rfl:     escitalopram oxalate (LEXAPRO) 20 MG tablet, Take 20 mg by mouth once daily., Disp: , Rfl:     ferrous sulfate 325 mg (65 mg iron) Tab tablet, Take 325 mg by mouth daily with breakfast., Disp: , Rfl:     lisinopril (PRINIVIL,ZESTRIL) 30 MG tablet, Take 1.5 tablets (45 mg total) by mouth once daily., Disp: 30 tablet, Rfl: 0    metoprolol succinate (TOPROL-XL) 50 MG 24 hr tablet, Take 100 mg by mouth every evening. , Disp: , Rfl:     omeprazole (PRILOSEC) 10 MG capsule, Take 10 mg by mouth once daily., Disp: , Rfl:     temazepam (RESTORIL) 30 mg capsule, Take 1 capsule (30 mg total) by mouth nightly as needed for Insomnia., Disp: 30 capsule, Rfl: 5    hydrOXYzine pamoate (VISTARIL) 25 MG Cap, Take 25 mg by mouth 4 (four) times daily., Disp: , Rfl:         Objective:       Physical Examination:     BP (!) 143/80 (BP Location: Left arm, Patient Position: Sitting, BP Method: Large (Manual))   Pulse 72   Temp 97.8 °F (36.6 °C) (Oral)   Resp 18   Wt 109 kg (240 lb 6.4 oz)   SpO2 98%   BMI 36.55 kg/m²     Physical Exam   Constitutional: She is oriented to person, place, and time. She  appears well-developed and well-nourished. No distress.   HENT:   Head: Normocephalic and atraumatic.   Nose: Nose normal.   Mouth/Throat: Oropharynx is clear and moist.   Eyes: Conjunctivae and EOM are normal. Pupils are equal, round, and reactive to light.   Neck: Normal range of motion. Neck supple. No thyromegaly present.   Cardiovascular: Normal rate, regular rhythm, normal heart sounds and intact distal pulses.    No murmur heard.  Pulmonary/Chest: Effort normal and breath sounds normal.   Abdominal: Soft. Bowel sounds are normal. There is no tenderness.   Musculoskeletal: She exhibits no edema.   Lymphadenopathy:     She has no cervical adenopathy.   Neurological: She is alert and oriented to person, place, and time.   Skin: Skin is warm and dry. Rash (2cm nummular erythematous patch w/o scale, vesicles) noted.         Assessment/Plan:   Martita is a 51 y.o. female here to establish care.     Problem List Items Addressed This Visit        Psychiatric    Depression with anxiety/PTSD    Current Assessment & Plan     Pt states he psychatrist is no longer practicing. Continue lexapro, PRN vistaril, restoril QHS.  Gave pt names of area psychiatrists who take her insurance.             Derm    Nummular dermatitis    Current Assessment & Plan     Only 1 lesion currently, very non-specific appearance. Pt awaiting biopsy results from dermatology.             Cardiac/Vascular    Benign essential hypertension    Current Assessment & Plan     Continue toprol xl, lisinopril, amlodipine and clonidine.           Relevant Orders    Comprehensive metabolic panel    Atrial fibrillation    Current Assessment & Plan     Managed by Dr. Malloy. On rate control with metoprolol, Eliquis anticoagulation.             Renal/    Hot flashes due to surgical menopause    Current Assessment & Plan     Discussed with pt that I don't feel comfortable giving her HRT given her risk of blood clots with afib. Suggested trial of black cohosh.              Oncology    Iron deficiency anemia due to chronic blood loss    Current Assessment & Plan     F/u with Dr. Rocha. Most recent CBC WNL.  Continue iron tabs.  Off iron infusions for now.  Per notes no source of bleeding found (previously thought to be due to heavy menstrual bleeding but didn't improve after hysterectomy).  Pt sees Dr. Schnedier for GI and has had upper and lower GI scops w/o bleeding (per pt).             GI    Gastroesophageal reflux disease without esophagitis    Current Assessment & Plan     Continue omperazole. F/u with Dr. Schneider.             Orthopedic    Fibromyalgia - Primary    Current Assessment & Plan     F/u with Dr. Coleman. Currently on lexapro with PRN restoril for sleep.            Other Visit Diagnoses     Encounter for hepatitis C screening test for low risk patient        Relevant Orders    Hepatitis C antibody    Screening for diabetes mellitus        Relevant Orders    Comprehensive metabolic panel    Screening, lipid        Relevant Orders    Lipid panel          Health Maintenance   Topic Date Due    Hepatitis C Screening  1965    Colonoscopy  12/17/2014    Eye Exam  01/01/2016    Mammogram  01/01/2017    Lipid Panel  01/07/2022    TETANUS VACCINE  01/01/2024    Influenza Vaccine  Addressed   Mammogram done at [facility].     Discussion:     Return in about 3 months (around 1/19/2018).    Goals     None          Electronically signed by Lucrecia Monae

## 2017-10-19 NOTE — PATIENT INSTRUCTIONS
Try black cohosh for your hot flashes.      Mental Health Specialists:    [facility]  [address] [address]         Phone: [phone]           [facility]         [address] [place] [address]         Phone: [phone]    Please have your labs done at [facility] (fasting) in January, before your next visit.

## 2017-10-19 NOTE — LETTER
October 19, 2017      Sukhdeep Rocha MD  [address]  [address]  [place] [address]           [facility]  [address] [address]  Phone: [phone]  Fax: [phone]          Patient: Martita San   MR Number: [id number]   YOB: 1965   Date of Visit: 10/19/2017       Dear Dr. Sukhdeep Rocha:    Thank you for referring Martita San to me for evaluation. Attached you will find relevant portions of my assessment and plan of care.    If you have questions, please do not hesitate to call me. I look forward to following Martita San along with you.    Sincerely,    Lucrecia Monae MD    Enclosure  CC:  No Recipients    If you would like to receive this communication electronically, please contact [email or web address] or [phone] to request more information on [employer] access.    For providers and/or their staff who would like to refer a patient to [facility], please contact us through our one-stop-shop provider referral line, [facility], at [phone].    If you feel you have received this communication in error or would no longer like to receive these types of communications, please e-mail [email or web address]

## 2017-10-19 NOTE — ASSESSMENT & PLAN NOTE
F/u with Dr. Rocha. Most recent CBC WNL.  Continue iron tabs.  Off iron infusions for now.  Per notes no source of bleeding found (previously thought to be due to heavy menstrual bleeding but didn't improve after hysterectomy).  Pt sees Dr. Schneider for GI and has had upper and lower GI scops w/o bleeding (per pt).

## 2017-10-19 NOTE — ASSESSMENT & PLAN NOTE
Pt states he psychatrist is no longer practicing. Continue lexapro, PRN vistaril, restoril QHS.  Gave pt names of area psychiatrists who take her insurance.

## 2017-10-19 NOTE — ASSESSMENT & PLAN NOTE
Discussed with pt that I don't feel comfortable giving her HRT given her risk of blood clots with afib. Suggested trial of black cohosh.

## 2017-11-15 ENCOUNTER — HISTORICAL (OUTPATIENT)
Dept: ADMINISTRATIVE | Facility: HOSPITAL | Age: 52
End: 2017-11-15

## 2017-11-15 LAB
ALBUMIN SERPL-MCNC: 4.1 G/DL (ref 3.1–4.7)
ALP SERPL-CCNC: 61 IU/L (ref 40–104)
ALT (SGPT): 18 IU/L (ref 3–33)
AST SERPL-CCNC: 17 IU/L (ref 10–40)
BASOPHILS NFR BLD: 0.1 K/UL (ref 0–0.2)
BASOPHILS NFR BLD: 1.2 %
BILIRUB SERPL-MCNC: 0.7 MG/DL (ref 0.3–1)
BUN SERPL-MCNC: 21 MG/DL (ref 8–20)
CALCIUM SERPL-MCNC: 9.7 MG/DL (ref 7.7–10.4)
CHLORIDE: 105 MMOL/L (ref 98–110)
CO2 SERPL-SCNC: 29.6 MMOL/L (ref 22.8–31.6)
CREATININE: 0.94 MG/DL (ref 0.6–1.4)
EOSINOPHIL NFR BLD: 0.2 K/UL (ref 0–0.7)
EOSINOPHIL NFR BLD: 3.5 %
ERYTHROCYTE [DISTWIDTH] IN BLOOD BY AUTOMATED COUNT: 13.3 % (ref 12.5–14.5)
GLUCOSE: 95 MG/DL (ref 70–99)
GRAN #: 3.2 K/UL (ref 1.4–6.5)
GRAN%: 62 %
HCT VFR BLD AUTO: 45.8 % (ref 36–48)
HGB BLD-MCNC: 15.2 G/DL (ref 12–15)
IMMATURE GRANS (ABS): 0 K/UL (ref 0–1)
IMMATURE GRANULOCYTES: 0.2 %
LYMPH #: 1.3 K/UL (ref 1.2–3.4)
LYMPH%: 25.8 %
MCH RBC QN AUTO: 28.3 PG (ref 25–35)
MCHC RBC AUTO-ENTMCNC: 33.2 G/DL (ref 31–36)
MCV RBC AUTO: 85.3 FL (ref 79–98)
MONO #: 0.4 K/UL (ref 0.1–0.6)
MONO%: 7.3 %
NUCLEATED RBCS: 0 %
NUCLEATED RED BLOOD CELLS: 0 /100 WBC
PERFORMED BY:: ABNORMAL
PLATELET # BLD AUTO: 240 K/UL (ref 140–440)
PMV BLD AUTO: 9.8 FL (ref 8.8–12.7)
POTASSIUM SERPL-SCNC: 4.1 MMOL/L (ref 3.5–5)
PROT SERPL-MCNC: 7.3 G/DL (ref 6–8.2)
RBC # BLD AUTO: 5.37 M/UL (ref 3.5–5.5)
SODIUM: 140 MMOL/L (ref 134–144)
VITAMIN B12: 571 PG/ML (ref 62–940)
WBC # BLD: 5.2 K/UL (ref 5–10)

## 2017-11-16 LAB — HCV AB SERPL QL IA: <0.1 S/CO RATIO (ref 0–0.9)

## 2017-11-17 NOTE — PROGRESS NOTES
Please call patient with normal results. Cholesterol is a little elevated but no need for medication, can discuss diet changes at upcoming appointment.

## 2018-09-13 ENCOUNTER — TELEPHONE (OUTPATIENT)
Dept: HEMATOLOGY/ONCOLOGY | Facility: CLINIC | Age: 53
End: 2018-09-13

## 2018-09-13 NOTE — TELEPHONE ENCOUNTER
Called patient left message that labs were in normal range if any questions to call back       ----- Message from Corrie Johnson sent at 9/13/2018 11:54 AM CDT -----  Patient called in requesting nurse please contact her with her BW results done in Infusion. Please advise and contact patient at [phone]. Thanks

## 2018-12-05 ENCOUNTER — TELEPHONE (OUTPATIENT)
Dept: HEMATOLOGY/ONCOLOGY | Facility: CLINIC | Age: 53
End: 2018-12-05

## 2018-12-05 NOTE — TELEPHONE ENCOUNTER
We have referred the patient to the ER because garfield set her over here with no appointment to have a rash looked at that we have no way of diagnosing in the office .

## 2018-12-06 ENCOUNTER — OFFICE VISIT (OUTPATIENT)
Dept: RHEUMATOLOGY | Facility: CLINIC | Age: 53
End: 2018-12-06
Payer: MEDICARE

## 2018-12-06 VITALS — BODY MASS INDEX: 32.99 KG/M2 | SYSTOLIC BLOOD PRESSURE: 128 MMHG | WEIGHT: 217 LBS | DIASTOLIC BLOOD PRESSURE: 93 MMHG

## 2018-12-06 DIAGNOSIS — M79.7 FIBROMYALGIA: ICD-10-CM

## 2018-12-06 DIAGNOSIS — Z91.199 NONCOMPLIANCE: ICD-10-CM

## 2018-12-06 DIAGNOSIS — R76.8 POSITIVE ANA (ANTINUCLEAR ANTIBODY): Primary | ICD-10-CM

## 2018-12-06 PROCEDURE — 99214 OFFICE O/P EST MOD 30 MIN: CPT | Mod: ,,, | Performed by: INTERNAL MEDICINE

## 2018-12-06 RX ORDER — ESCITALOPRAM OXALATE 10 MG/1
10 TABLET ORAL DAILY
Qty: 30 TABLET | Refills: 5 | Status: SHIPPED | OUTPATIENT
Start: 2018-12-06 | End: 2019-04-11 | Stop reason: SDUPTHER

## 2018-12-06 RX ORDER — HYDROXYZINE PAMOATE 25 MG/1
CAPSULE ORAL
Qty: 45 CAPSULE | Refills: 5 | Status: SHIPPED | OUTPATIENT
Start: 2018-12-06 | End: 2019-04-11 | Stop reason: SDUPTHER

## 2018-12-06 NOTE — PROGRESS NOTES
[facility] RHEUMATOLOGY           Follow-up visit    Notes dictated via Dragon to EPIC. Please forgive any unintentional errors.  Subjective:       Patient ID:   NAME: Martita San : 1965     52 y.o. female    Referring Doc: No ref. provider found  Other Physicians:    Chief Complaint:  Primary insomnia (Rash to chest)      HPI/Interval History:  The patient has not been in for 1 year. She states that is because she was in [place] for 4 of those months. She has been out of her medications for at least 6 months and is feeling the fibromyalgia more each week. She developed a skin lesion the left upper chest last month. She does not recall any trauma to the area. She tells me that she showed it to her cardiologist today and he asked her to be seen immediately here for that problem. I note that she has been on Eliquis for her history of CVA and that that drug seems to have been discontinued. She told me that her cardiologist, Dr. Mclaughlin, told her not to take it until I have seen her!          ROS:   GEN:    no fever, night sweats or weight loss  SKIN:    + rash left chest, no bruising, or swelling, no Raynauds, +/- photosensitivity  HEENT: no changes in vision, no mouth ulcers, no sicca symptoms, no scalp tenderness, no jaw claudication.  CV:      no CP, PND, ANTON or orthopnea, no palpitations  PULM: no SOB, cough, hemoptysis, sputum or pleuritic pain  GI:       no abdominal pain, nausea, vomiting, constipation, diarrhea, melanotic stools, BRBPR, or hematemesis, no dysphagia, no GERD  :     no hematuria, dysuria  NEURO: no paresthesias, headaches, acute visual disturbances  MUSCULOSKELETAL:  Myalgias and arthralgias without any red, hot, and/or swollen joints  PSYCH:   No insomnia, no significant anxiety or depression    Medications:    Current Outpatient Medications:     amlodipine (NORVASC) 5 MG tablet, Take 5 mg by mouth once daily., Disp: , Rfl:     cloNIDine (CATAPRES) 0.1 MG tablet, Take 0.1 mg by  mouth 3 (three) times daily., Disp: , Rfl:     diclofenac sodium 1 % Gel, 2 g., Disp: , Rfl:     lisinopril (PRINIVIL,ZESTRIL) 30 MG tablet, Take 1.5 tablets (45 mg total) by mouth once daily., Disp: 30 tablet, Rfl: 0    metoprolol succinate (TOPROL-XL) 50 MG 24 hr tablet, Take 100 mg by mouth every evening. , Disp: , Rfl:     escitalopram oxalate (LEXAPRO) 10 MG tablet, Take 1 tablet (10 mg total) by mouth once daily., Disp: 30 tablet, Rfl: 5    hydrOXYzine pamoate (VISTARIL) 25 MG Cap, Take one to two capsules nightly as needed for sleep, Disp: 45 capsule, Rfl: 5      FAMILY HISTORY: negative for Connective Tissue Disease        Review of patient's allergies indicates:   Allergen Reactions    Pcn [penicillins] Anaphylaxis    Benadryl allergy decongestant     Clindamycin Hives    Influenza virus vaccines     Latex, natural rubber Hives    Oxycodone              Objective:     Vitals:  Blood pressure (!) 128/93, weight 98.4 kg (217 lb).    Physical Examination:   GEN: wn/wd female in no apparent distress  SKIN:  A single 3 cm round, hyperpigmented, flat, scaly, blanching lesion is noted in the left upper anterior chest, no sclerodactyly, no Raynaud's, no periungual erythema  HEAD: no alopecia, no scalp tenderness, no temporal artery tenderness or induration.  EYES: no pallor, no icterus, PERRLA  ENT:  no thrush, no mucosal dryness or ulcerations, adequate oral hygiene & dentition.  NECK: supple x 6, no masses, no thyromegaly, no lymphadenopathy.  CV:   S1 and S2 regular, no murmurs, gallop or rubs  CHEST: Normal respiratory effort;  normal breath sounds/no adventitious sounds. No signs of consolidation.  ABD: non-tender and non-distended; soft; normal bowel sounds; no rebound/guarding or tenderness. No hepatosplenomegaly.  Musculoskeletal:  No evidence of inflammatory arthritis or of any progressive degenerative disease  EXTREM: no clubbing, cyanosis or edema. normal pulses.  NEURO: grossly intact;  motor/sensory WNL; no tremors  PSYCH:  normal mood, affect and behavior            Labs:   Lab Results   Component Value Date    WBC 5.2 11/15/2017    HGB 15.2 (H) 11/15/2017    HCT 45.8 11/15/2017    MCV 73 (L) 12/28/2016     11/15/2017   CMP@  Sodium   Date Value Ref Range Status   11/15/2017 140 134 - 144 mmol/L      Potassium   Date Value Ref Range Status   11/15/2017 4.1 3.5 - 5.0 mmol/L      Chloride   Date Value Ref Range Status   11/15/2017 105 98 - 110 mmol/L      CO2   Date Value Ref Range Status   11/15/2017 29.6 22.8 - 31.6 mmol/L      Glucose   Date Value Ref Range Status   11/15/2017 95 70 - 99 mg/dL      BUN, Bld   Date Value Ref Range Status   11/15/2017 21 (H) 8 - 20 mg/dL      Creatinine   Date Value Ref Range Status   11/15/2017 0.94 0.60 - 1.40 mg/dL      Calcium   Date Value Ref Range Status   11/15/2017 9.7 7.7 - 10.4 mg/dL      Total Protein   Date Value Ref Range Status   11/15/2017 7.3 6.0 - 8.2 g/dL      Albumin   Date Value Ref Range Status   11/15/2017 4.1 3.1 - 4.7 g/dL      Total Bilirubin   Date Value Ref Range Status   11/15/2017 0.7 0.3 - 1.0 mg/dL      Alkaline Phosphatase   Date Value Ref Range Status   11/15/2017 61 40 - 104 IU/L      AST   Date Value Ref Range Status   11/15/2017 17 10 - 40 IU/L      ALT   Date Value Ref Range Status   12/26/2016 15 10 - 44 U/L Final         Radiology/Diagnostic Studies:    none    Assessment/Discussion/Plan:   52 y.o. female with fibromyalgia- exacerbated by noncompliance with medical regimen and therefore noncompliance with medications  2) skin lesion left upper chest-appears psoriatic, certainly not vasculitic    PLAN:  I discussed the need for compliance with her. I told her that I saw her first in December 2016 [place], then not again until October 2017,and now she returns more than one year later! I have made it clear that I am not going to continue treating her if there are any further episodes of noncompliance with either appointments  or medications. I have instructed her to resume taking her anticoagulant immediately. She was instructed to go from my office directly to her cardiologist's office to check with him for a prescription.  I ordered blood testing today including an TANA/SSA and an ANCA.  I have restarted Lexapro at only 10 mg for now. I have renewed hydroxyzine though not temazepam.    RTC:  I will see her back in 4 months or sooner if needed.       Electronically signed by Reji Coleman MD

## 2018-12-10 LAB — ANA SER-ACNC: NEGATIVE

## 2019-04-11 ENCOUNTER — OFFICE VISIT (OUTPATIENT)
Dept: RHEUMATOLOGY | Facility: CLINIC | Age: 54
End: 2019-04-11
Payer: MEDICARE

## 2019-04-11 VITALS — SYSTOLIC BLOOD PRESSURE: 127 MMHG | DIASTOLIC BLOOD PRESSURE: 74 MMHG | WEIGHT: 209.81 LBS | BODY MASS INDEX: 31.9 KG/M2

## 2019-04-11 DIAGNOSIS — F41.8 MIXED ANXIETY DEPRESSIVE DISORDER: Primary | ICD-10-CM

## 2019-04-11 DIAGNOSIS — M79.7 FIBROMYALGIA: ICD-10-CM

## 2019-04-11 PROCEDURE — 99213 PR OFFICE/OUTPT VISIT, EST, LEVL III, 20-29 MIN: ICD-10-PCS | Mod: ,,, | Performed by: INTERNAL MEDICINE

## 2019-04-11 PROCEDURE — 99213 OFFICE O/P EST LOW 20 MIN: CPT | Mod: ,,, | Performed by: INTERNAL MEDICINE

## 2019-04-11 RX ORDER — HYDROXYZINE PAMOATE 25 MG/1
CAPSULE ORAL
Qty: 45 CAPSULE | Refills: 5 | Status: SHIPPED | OUTPATIENT
Start: 2019-04-11 | End: 2020-07-28 | Stop reason: CLARIF

## 2019-04-11 RX ORDER — ESCITALOPRAM OXALATE 10 MG/1
10 TABLET ORAL DAILY
Qty: 30 TABLET | Refills: 5 | Status: SHIPPED | OUTPATIENT
Start: 2019-04-11 | End: 2020-07-28 | Stop reason: CLARIF

## 2019-04-11 NOTE — PROGRESS NOTES
[facility] RHEUMATOLOGY           Follow-up visit    Notes dictated via Dragon to EPIC. Please forgive any unintended errors.  Subjective:       Patient ID:   NAME: Martita San : 1965     53 y.o. female    Referring Doc: No ref. provider found  Other Physicians:    Chief Complaint:  Positive TANA      HPI/Interval History:   The patient is doing much better. She is taking her medication as prescribed. She no longer has significant muscular pain during the day. She is also now sleeping well. She has been exercising daily and has joined a gym. She is dieting and has lost 60 pounds in 6 months!          ROS:   GEN:    no fever, night sweats or weight loss  SKIN:   no rashes , erythema, bruising, or swelling, no Raynauds, no photosensitivity  HEENT: no changes in vision, no mouth ulcers, no sicca symptoms, no scalp tenderness, no jaw claudication.  CV:      no CP, PND, ANTON or orthopnea, no palpitations  PULM: no SOB, cough, hemoptysis, sputum or pleuritic pain  GI:       no GERD, no dysphagia, no abdominal pain, nausea, vomiting, constipation, diarrhea, melanotic stools, BRBPR, or hematemesis  :      no hematuria, dysuria  NEURO: no paresthesias, headaches, acute visual disturbances  MUSCULOSKELETAL:  No muscle or joint complaints  PSYCH:   No insomnia, no increased anxiety or depression    Medications:    Current Outpatient Medications:     amlodipine (NORVASC) 5 MG tablet, Take 5 mg by mouth once daily., Disp: , Rfl:     apixaban (ELIQUIS) 5 mg Tab, Take 5 mg by mouth once daily., Disp: , Rfl:     cloNIDine (CATAPRES) 0.1 MG tablet, Take 0.1 mg by mouth 3 (three) times daily., Disp: , Rfl:     diclofenac sodium 1 % Gel, 2 g., Disp: , Rfl:     escitalopram oxalate (LEXAPRO) 10 MG tablet, Take 1 tablet (10 mg total) by mouth once daily., Disp: 30 tablet, Rfl: 5    hydrOXYzine pamoate (VISTARIL) 25 MG Cap, Take one to two capsules nightly as needed for sleep, Disp: 45 capsule, Rfl: 5    lisinopril  (PRINIVIL,ZESTRIL) 30 MG tablet, Take 1.5 tablets (45 mg total) by mouth once daily., Disp: 30 tablet, Rfl: 0    metoprolol succinate (TOPROL-XL) 50 MG 24 hr tablet, Take 100 mg by mouth every evening. , Disp: , Rfl:       FAMILY HISTORY: negative for Connective Tissue Disease        Review of patient's allergies indicates:   Allergen Reactions    Pcn [penicillins] Anaphylaxis    Benadryl allergy decongestant     Clindamycin Hives    Diphenhydramine hcl Hives    Influenza virus vaccines     Latex, natural rubber Hives    Oxycodone     Adhesive Rash             Objective:     Vitals:  Blood pressure 127/74, weight 95.2 kg (209 lb 12.8 oz).    Physical Examination:   GEN: wn/wd female in no apparent distress  SKIN: no rashes, no sclerodactyly, no Raynaud's, no periungual erythema, no digital tip ulcerations, no nailbed pitting  HEAD: no alopecia, no scalp tenderness, no temporal artery tenderness or induration.  EYES: no pallor, no icterus, PERRLA  ENT:  no thrush, no mucosal dryness or ulcerations, adequate oral hygiene & dentition.  NECK: supple x 6, no masses, no thyromegaly, no lymphadenopathy.  CV:   S1 and S2 regular, no murmurs, gallop or rubs  CHEST: Normal respiratory effort;  normal breath sounds/no adventitious sounds. No signs of consolidation.  ABD: non-tender and non-distended; soft; normal bowel sounds; no rebound/guarding or tenderness. No hepatosplenomegaly.  Musculoskeletal:  no evidence of inflammatory or degenerative disease  EXTREM: no clubbing, cyanosis or edema. normal pulses.  NEURO:  grossly intact; motor/sensory WNL; no tremors  PSYCH:  normal mood, affect and behavior            Labs:   Lab Results   Component Value Date    WBC 5.2 11/15/2017    HGB 15.2 (H) 11/15/2017    HCT 45.8 11/15/2017    MCV 73 (L) 12/28/2016     11/15/2017   CMP@  Sodium   Date Value Ref Range Status   11/15/2017 140 134 - 144 mmol/L      Potassium   Date Value Ref Range Status   11/15/2017 4.1 3.5 -  5.0 mmol/L      Chloride   Date Value Ref Range Status   11/15/2017 105 98 - 110 mmol/L      CO2   Date Value Ref Range Status   11/15/2017 29.6 22.8 - 31.6 mmol/L      Glucose   Date Value Ref Range Status   11/15/2017 95 70 - 99 mg/dL      BUN, Bld   Date Value Ref Range Status   11/15/2017 21 (H) 8 - 20 mg/dL      Creatinine   Date Value Ref Range Status   11/15/2017 0.94 0.60 - 1.40 mg/dL      Calcium   Date Value Ref Range Status   11/15/2017 9.7 7.7 - 10.4 mg/dL      Total Protein   Date Value Ref Range Status   11/15/2017 7.3 6.0 - 8.2 g/dL      Albumin   Date Value Ref Range Status   11/15/2017 4.1 3.1 - 4.7 g/dL      Total Bilirubin   Date Value Ref Range Status   11/15/2017 0.7 0.3 - 1.0 mg/dL      Alkaline Phosphatase   Date Value Ref Range Status   11/15/2017 61 40 - 104 IU/L      AST   Date Value Ref Range Status   11/15/2017 17 10 - 40 IU/L      ALT   Date Value Ref Range Status   12/26/2016 15 10 - 44 U/L Final     TANA   Date Value Ref Range Status   12/06/2018 Negative       Comment:                                          Negative   <1:80                                     Borderline  1:80                                     Positive   >1:80Performed at: [facility], [facility] [address], [id number], Phone:  [phone]           Radiology/Diagnostic Studies:    None    Assessment/Discussion/Plan:   53 y.o. female with fibromyalgia-much improved on Lexapro 10 mg daily and hydroxyzine 25 mg nightly      PLAN:  I will continue her medication unchanged. No further blood testing is required.  I have congratulated her on taking a proactive stance on her health care.      RTC:  I will see her back in 3 months      Electronically signed by Reji Coleman MD

## 2019-07-12 ENCOUNTER — TELEPHONE (OUTPATIENT)
Dept: HEMATOLOGY/ONCOLOGY | Facility: CLINIC | Age: 54
End: 2019-07-12

## 2019-07-12 DIAGNOSIS — H91.90 HEARING LOSS, UNSPECIFIED HEARING LOSS TYPE, UNSPECIFIED LATERALITY: Primary | ICD-10-CM

## 2019-07-12 NOTE — TELEPHONE ENCOUNTER
----- Message from Raquel Moreno sent at 7/12/2019 11:46 AM CDT -----  Pt needs a referral to see a hearing dr named Dr. Wiley.  [phone]  Pt has an appt on Monday afternoon.  She needs a dr to dr referral.    CB# [phone]

## 2019-07-15 ENCOUNTER — TELEPHONE (OUTPATIENT)
Dept: OTOLARYNGOLOGY | Facility: CLINIC | Age: 54
End: 2019-07-15

## 2019-07-15 NOTE — TELEPHONE ENCOUNTER
----- Message from Sandi Cordoba sent at 7/15/2019 10:08 AM CDT -----  Contact: self  Patient has an appt today at 2 PM and 2:45 and she needs to reschedule call back at [phone] (home).  I couldn't get an appt with the doctor.  Thanks

## 2020-01-13 ENCOUNTER — TELEPHONE (OUTPATIENT)
Dept: OTOLARYNGOLOGY | Facility: CLINIC | Age: 55
End: 2020-01-13

## 2020-01-13 ENCOUNTER — HOSPITAL ENCOUNTER (EMERGENCY)
Facility: HOSPITAL | Age: 55
Discharge: HOME | End: 2020-01-13
Attending: EMERGENCY MEDICINE
Payer: MEDICARE

## 2020-01-13 VITALS
HEART RATE: 78 BPM | HEIGHT: 67 IN | WEIGHT: 187 LBS | RESPIRATION RATE: 18 BRPM | DIASTOLIC BLOOD PRESSURE: 88 MMHG | SYSTOLIC BLOOD PRESSURE: 140 MMHG | TEMPERATURE: 99 F | BODY MASS INDEX: 29.35 KG/M2 | OXYGEN SATURATION: 97 %

## 2020-01-13 DIAGNOSIS — R05.9 COUGH: ICD-10-CM

## 2020-01-13 DIAGNOSIS — J41.0 CHRONIC BRONCHITIS, SIMPLE: Primary | ICD-10-CM

## 2020-01-13 LAB
DEPRECATED S PYO AG THROAT QL EIA: NEGATIVE
INFLUENZA A, MOLECULAR: NEGATIVE
INFLUENZA B, MOLECULAR: NEGATIVE
SPECIMEN SOURCE: NORMAL

## 2020-01-13 PROCEDURE — 87081 CULTURE SCREEN ONLY: CPT

## 2020-01-13 PROCEDURE — 87502 INFLUENZA DNA AMP PROBE: CPT

## 2020-01-13 PROCEDURE — 99284 EMERGENCY DEPT VISIT MOD MDM: CPT | Mod: 25

## 2020-01-13 PROCEDURE — 87880 STREP A ASSAY W/OPTIC: CPT

## 2020-01-13 RX ORDER — BENZONATATE 200 MG/1
200 CAPSULE ORAL 3 TIMES DAILY PRN
Qty: 30 CAPSULE | Refills: 0 | Status: SHIPPED | OUTPATIENT
Start: 2020-01-13 | End: 2020-01-23

## 2020-01-13 RX ORDER — METHYLPREDNISOLONE 4 MG/1
TABLET ORAL
Qty: 1 PACKAGE | Refills: 0 | Status: SHIPPED | OUTPATIENT
Start: 2020-01-13 | End: 2020-07-28 | Stop reason: CLARIF

## 2020-01-13 RX ORDER — PROMETHAZINE HYDROCHLORIDE AND DEXTROMETHORPHAN HYDROBROMIDE 6.25; 15 MG/5ML; MG/5ML
5 SYRUP ORAL EVERY 4 HOURS PRN
Qty: 300 ML | Refills: 0 | Status: SHIPPED | OUTPATIENT
Start: 2020-01-13 | End: 2020-01-23

## 2020-01-13 RX ORDER — ALBUTEROL SULFATE 90 UG/1
2 AEROSOL, METERED RESPIRATORY (INHALATION) EVERY 6 HOURS PRN
Qty: 18 G | Refills: 0 | Status: SHIPPED | OUTPATIENT
Start: 2020-01-13 | End: 2021-01-12

## 2020-01-13 RX ORDER — DOXYCYCLINE HYCLATE 100 MG
100 TABLET ORAL 2 TIMES DAILY
Qty: 28 TABLET | Refills: 0 | Status: SHIPPED | OUTPATIENT
Start: 2020-01-13 | End: 2020-01-27

## 2020-01-13 NOTE — TELEPHONE ENCOUNTER
----- Message from Carrie Denice sent at 1/13/2020  4:48 PM CST -----  Contact: pt  Type: Needs Medical Advice    Who Called:      Best Call Back Number:   Additional Information: Requesting a call back from Nurse, regarding access to a appt for tomorrow  ,please call back with advice

## 2020-01-14 NOTE — ED PROVIDER NOTES
Encounter Date: 2020       History     Chief Complaint   Patient presents with    Cough     congestion for 2 months     54-year-old female, presents emergency department for evaluation of complaints of chronic upper respiratory infection.  Patient states everybody in her household has been sick for the last several months in a discontinued does seem to be passing the infection back and forth between each other.  She endorses fever however temp sore less than 100.  Patient has sore throat, cough, congestion body aches.    Patient has chronic pain, she smokes marijuana regularly and CBD oil.        Review of patient's allergies indicates:   Allergen Reactions    Pcn [penicillins] Anaphylaxis    Benadryl allergy decongestant     Clindamycin Hives    Diphenhydramine hcl Hives    Influenza virus vaccines     Latex, natural rubber Hives    Oxycodone     Adhesive Rash     Past Medical History:   Diagnosis Date    Allergy     Anxiety     Carotid stenosis 2016    Chronic anemia     COPD (chronic obstructive pulmonary disease)     Depression     Fibromyalgia     Osteoarthritis     Pinched nerve     PTSD (post-traumatic stress disorder)     Tachycardia     Thyroid disease     Torn rotator cuff     Uterine fibroid 10/2016    Vertigo      Past Surgical History:   Procedure Laterality Date     SECTION      HYSTERECTOMY      LAMINECTOMY      TONSILLECTOMY       Family History   Problem Relation Age of Onset    Arthritis Mother     Hypertension Mother     COPD Mother     Depression Mother     Arthritis Father     Depression Father     Hypertension Father     Heart disease Father     COPD Father     Heart attack Father     Asthma Father     Heart attack Maternal Grandmother     Cancer Maternal Grandmother     Hypertension Maternal Grandmother     Hypertension Paternal Grandmother     Stroke Paternal Grandmother     Tuberculosis Paternal Grandfather      Social History      Tobacco Use    Smoking status: Never Smoker    Smokeless tobacco: Never Used   Substance Use Topics    Alcohol use: No    Drug use: Yes     Types: Marijuana     Review of Systems   Constitutional: Negative for chills and fever.   HENT: Positive for congestion and sore throat. Negative for rhinorrhea.    Eyes: Negative for discharge and redness.   Respiratory: Positive for cough. Negative for shortness of breath and wheezing.    Cardiovascular: Negative for chest pain.   Gastrointestinal: Negative for abdominal pain, diarrhea, nausea and vomiting.   Musculoskeletal: Negative for arthralgias, back pain and joint swelling.   Skin: Negative for rash and wound.   Neurological: Negative for weakness.   Psychiatric/Behavioral: The patient is not nervous/anxious.    All other systems reviewed and are negative.      Physical Exam     Initial Vitals [01/13/20 1740]   BP Pulse Resp Temp SpO2   (!) 140/88 78 18 98.8 °F (37.1 °C) 97 %      MAP       --         Physical Exam    Nursing note and vitals reviewed.  Constitutional: She appears well-developed and well-nourished.   HENT:   Head: Normocephalic and atraumatic.   Right Ear: Tympanic membrane normal. No drainage. No middle ear effusion.   Left Ear: Tympanic membrane normal. No drainage.  No middle ear effusion.   Nose: Nose normal.   Mouth/Throat: Uvula is midline, oropharynx is clear and moist and mucous membranes are normal. No posterior oropharyngeal edema or posterior oropharyngeal erythema.   Eyes: EOM are normal. Pupils are equal, round, and reactive to light.   Neck: Normal range of motion.   Cardiovascular: Normal rate and regular rhythm.   Pulmonary/Chest: Breath sounds normal. No respiratory distress. She has no wheezes. She exhibits no deformity.   Musculoskeletal: Normal range of motion.   Neurological: She is alert and oriented to person, place, and time. GCS score is 15. GCS eye subscore is 4. GCS verbal subscore is 5. GCS motor subscore is 6.   Skin:  Skin is warm and dry. No rash noted.   Psychiatric: She has a normal mood and affect. Her behavior is normal.         ED Course   Procedures  Labs Reviewed - No data to display       Imaging Results          X-Ray Chest PA And Lateral (In process)               X-Rays:   Independently Interpreted Readings:   Chest X-Ray: Normal heart size.  No infiltrates.  No acute abnormalities.     Medical Decision Making:   History:   Old Medical Records: I decided to obtain old medical records.  Initial Assessment:   NAD  Differential Diagnosis:   The patient's differential diagnoses includes but is not limited to upper respiratory infection, viral illness, influenza, pharyngitis, COPD, chronic bronchitis  Clinical Tests:   Radiological Study: Ordered and Reviewed  ED Management:  54-year-old female who most likely has chronic bronchitis and secondary to COPD continues to smoke presents to the emergency department for evaluation of a cold.  X-ray shows no evidence of infiltrate or consolidation.  No wheezing or adventitious sounds on auscultation. Swabs pending for influenza and strep however I do not feel the patient's clinical exam or findings are consistent with these processes.                       ED Course as of Jan 13 1923 Mon Jan 13, 2020 1903 RAPID STREP A SCREEN: Negative [BF]   1923 Influenza B, Molecular: Negative [BF]   1923 Negative for   X-Ray Chest PA And Lateral [BF]      ED Course User Index  [BF] SHAMIR Dean                Clinical Impression:       ICD-10-CM ICD-9-CM   1. Chronic bronchitis, simple J41.0 491.0   2. Cough R05 786.2                             SHAMIR Dean  01/13/20 1924

## 2020-01-15 LAB — BACTERIA THROAT CULT: NORMAL

## 2020-01-28 ENCOUNTER — CLINICAL SUPPORT (OUTPATIENT)
Dept: AUDIOLOGY | Facility: CLINIC | Age: 55
End: 2020-01-28
Payer: MEDICARE

## 2020-01-28 ENCOUNTER — OFFICE VISIT (OUTPATIENT)
Dept: OTOLARYNGOLOGY | Facility: CLINIC | Age: 55
End: 2020-01-28
Payer: MEDICARE

## 2020-01-28 ENCOUNTER — LAB VISIT (OUTPATIENT)
Dept: LAB | Facility: HOSPITAL | Age: 55
End: 2020-01-28
Attending: INTERNAL MEDICINE
Payer: MEDICARE

## 2020-01-28 VITALS — HEIGHT: 67 IN | BODY MASS INDEX: 29.34 KG/M2 | WEIGHT: 186.94 LBS

## 2020-01-28 DIAGNOSIS — I48.0 PAROXYSMAL ATRIAL FIBRILLATION: ICD-10-CM

## 2020-01-28 DIAGNOSIS — H91.90 PERCEIVED HEARING CHANGES: Primary | ICD-10-CM

## 2020-01-28 DIAGNOSIS — H93.13 TINNITUS, BILATERAL: ICD-10-CM

## 2020-01-28 DIAGNOSIS — Z01.10 PASSED HEARING SCREENING: ICD-10-CM

## 2020-01-28 DIAGNOSIS — D64.9 ANEMIA, UNSPECIFIED: Primary | ICD-10-CM

## 2020-01-28 DIAGNOSIS — H93.19 TINNITUS, UNSPECIFIED LATERALITY: Primary | ICD-10-CM

## 2020-01-28 DIAGNOSIS — R56.00 FEBRILE CONVULSION: ICD-10-CM

## 2020-01-28 DIAGNOSIS — R55 SYNCOPE AND COLLAPSE: ICD-10-CM

## 2020-01-28 DIAGNOSIS — E78.5 HYPERLIPEMIA: ICD-10-CM

## 2020-01-28 LAB
ALBUMIN SERPL BCP-MCNC: 4.1 G/DL (ref 3.5–5.2)
ALP SERPL-CCNC: 56 U/L (ref 55–135)
ALT SERPL W/O P-5'-P-CCNC: 14 U/L (ref 10–44)
ANION GAP SERPL CALC-SCNC: 10 MMOL/L (ref 8–16)
AST SERPL-CCNC: 14 U/L (ref 10–40)
BASOPHILS # BLD AUTO: 0.07 K/UL (ref 0–0.2)
BASOPHILS NFR BLD: 0.8 % (ref 0–1.9)
BILIRUB SERPL-MCNC: 0.6 MG/DL (ref 0.1–1)
BUN SERPL-MCNC: 13 MG/DL (ref 6–20)
CALCIUM SERPL-MCNC: 9.4 MG/DL (ref 8.7–10.5)
CHLORIDE SERPL-SCNC: 107 MMOL/L (ref 95–110)
CHOLEST SERPL-MCNC: 207 MG/DL (ref 120–199)
CHOLEST/HDLC SERPL: 3.8 {RATIO} (ref 2–5)
CO2 SERPL-SCNC: 28 MMOL/L (ref 23–29)
CREAT SERPL-MCNC: 0.7 MG/DL (ref 0.5–1.4)
DIFFERENTIAL METHOD: NORMAL
EOSINOPHIL # BLD AUTO: 0.5 K/UL (ref 0–0.5)
EOSINOPHIL NFR BLD: 5.7 % (ref 0–8)
ERYTHROCYTE [DISTWIDTH] IN BLOOD BY AUTOMATED COUNT: 13.8 % (ref 11.5–14.5)
EST. GFR  (AFRICAN AMERICAN): >60 ML/MIN/1.73 M^2
EST. GFR  (NON AFRICAN AMERICAN): >60 ML/MIN/1.73 M^2
GLUCOSE SERPL-MCNC: 101 MG/DL (ref 70–110)
HCT VFR BLD AUTO: 46.9 % (ref 37–48.5)
HDLC SERPL-MCNC: 54 MG/DL (ref 40–75)
HDLC SERPL: 26.1 % (ref 20–50)
HGB BLD-MCNC: 15.1 G/DL (ref 12–16)
IMM GRANULOCYTES # BLD AUTO: 0.04 K/UL (ref 0–0.04)
IMM GRANULOCYTES NFR BLD AUTO: 0.5 % (ref 0–0.5)
LDLC SERPL CALC-MCNC: 125.6 MG/DL (ref 63–159)
LYMPHOCYTES # BLD AUTO: 1.6 K/UL (ref 1–4.8)
LYMPHOCYTES NFR BLD: 18.6 % (ref 18–48)
MAGNESIUM SERPL-MCNC: 2 MG/DL (ref 1.6–2.6)
MCH RBC QN AUTO: 29.8 PG (ref 27–31)
MCHC RBC AUTO-ENTMCNC: 32.2 G/DL (ref 32–36)
MCV RBC AUTO: 93 FL (ref 82–98)
MONOCYTES # BLD AUTO: 0.7 K/UL (ref 0.3–1)
MONOCYTES NFR BLD: 7.7 % (ref 4–15)
NEUTROPHILS # BLD AUTO: 5.9 K/UL (ref 1.8–7.7)
NEUTROPHILS NFR BLD: 66.7 % (ref 38–73)
NONHDLC SERPL-MCNC: 153 MG/DL
NRBC BLD-RTO: 0 /100 WBC
PLATELET # BLD AUTO: 198 K/UL (ref 150–350)
PMV BLD AUTO: 10.1 FL (ref 9.2–12.9)
POTASSIUM SERPL-SCNC: 4.1 MMOL/L (ref 3.5–5.1)
PROT SERPL-MCNC: 7.2 G/DL (ref 6–8.4)
RBC # BLD AUTO: 5.07 M/UL (ref 4–5.4)
SODIUM SERPL-SCNC: 145 MMOL/L (ref 136–145)
TRIGL SERPL-MCNC: 137 MG/DL (ref 30–150)
WBC # BLD AUTO: 8.83 K/UL (ref 3.9–12.7)

## 2020-01-28 PROCEDURE — 92567 PR TYMPA2METRY: ICD-10-PCS | Mod: S$GLB,,, | Performed by: AUDIOLOGIST

## 2020-01-28 PROCEDURE — 92567 TYMPANOMETRY: CPT | Mod: S$GLB,,, | Performed by: AUDIOLOGIST

## 2020-01-28 PROCEDURE — 92557 PR COMPREHENSIVE HEARING TEST: ICD-10-PCS | Mod: S$GLB,,, | Performed by: AUDIOLOGIST

## 2020-01-28 PROCEDURE — 80061 LIPID PANEL: CPT

## 2020-01-28 PROCEDURE — 99203 OFFICE O/P NEW LOW 30 MIN: CPT | Mod: S$GLB,,, | Performed by: NURSE PRACTITIONER

## 2020-01-28 PROCEDURE — 99203 PR OFFICE/OUTPT VISIT, NEW, LEVL III, 30-44 MIN: ICD-10-PCS | Mod: S$GLB,,, | Performed by: NURSE PRACTITIONER

## 2020-01-28 PROCEDURE — 36415 COLL VENOUS BLD VENIPUNCTURE: CPT

## 2020-01-28 PROCEDURE — 92557 COMPREHENSIVE HEARING TEST: CPT | Mod: S$GLB,,, | Performed by: AUDIOLOGIST

## 2020-01-28 PROCEDURE — 83735 ASSAY OF MAGNESIUM: CPT

## 2020-01-28 PROCEDURE — 3008F BODY MASS INDEX DOCD: CPT | Mod: CPTII,S$GLB,, | Performed by: NURSE PRACTITIONER

## 2020-01-28 PROCEDURE — 99999 PR PBB SHADOW E&M-EST. PATIENT-LVL III: CPT | Mod: PBBFAC,,, | Performed by: NURSE PRACTITIONER

## 2020-01-28 PROCEDURE — 80053 COMPREHEN METABOLIC PANEL: CPT

## 2020-01-28 PROCEDURE — 99999 PR PBB SHADOW E&M-EST. PATIENT-LVL III: ICD-10-PCS | Mod: PBBFAC,,, | Performed by: NURSE PRACTITIONER

## 2020-01-28 PROCEDURE — 99999 PR PBB SHADOW E&M-EST. PATIENT-LVL I: ICD-10-PCS | Mod: PBBFAC,,,

## 2020-01-28 PROCEDURE — 99999 PR PBB SHADOW E&M-EST. PATIENT-LVL I: CPT | Mod: PBBFAC,,,

## 2020-01-28 PROCEDURE — 3008F PR BODY MASS INDEX (BMI) DOCUMENTED: ICD-10-PCS | Mod: CPTII,S$GLB,, | Performed by: NURSE PRACTITIONER

## 2020-01-28 PROCEDURE — 85025 COMPLETE CBC W/AUTO DIFF WBC: CPT

## 2020-01-28 RX ORDER — FUROSEMIDE 20 MG/1
TABLET ORAL
COMMUNITY
Start: 2019-12-05 | End: 2020-07-28 | Stop reason: CLARIF

## 2020-01-28 NOTE — PROGRESS NOTES
Subjective:       Patient ID: Martita San is a 54 y.o. female.    Chief Complaint: No chief complaint on file.    HPI   Patient is new to ENT, referred by Dr. Rocha for consultation for hearing loss. Patient reports aural congestion and muffled hearing since flying over Thanksgiving holiday. She asks others to repeat themselves. Her children have asked that she have her hearing tested.     Review of Systems   Constitutional: Negative.    HENT: Negative.    Eyes: Negative.    Respiratory: Negative.    Cardiovascular: Negative.    Gastrointestinal: Negative.    Musculoskeletal: Negative.    Skin: Negative.    Neurological: Negative.    Hematological: Negative.    Psychiatric/Behavioral: Negative.        Objective:      Physical Exam   Constitutional: She is oriented to person, place, and time. Vital signs are normal. She appears well-developed and well-nourished. She is cooperative. She does not appear ill. No distress.   HENT:   Head: Normocephalic and atraumatic.   Right Ear: Hearing, tympanic membrane, external ear and ear canal normal. Tympanic membrane is not erythematous. No middle ear effusion.   Left Ear: Hearing, tympanic membrane, external ear and ear canal normal. Tympanic membrane is not erythematous.  No middle ear effusion.   Nose: Nose normal.   Mouth/Throat: Uvula is midline, oropharynx is clear and moist and mucous membranes are normal. She has dentures.   Eyes: EOM and lids are normal. Right eye exhibits no discharge. Left eye exhibits no discharge. No scleral icterus.   Neck: Trachea normal and normal range of motion. Neck supple. No tracheal deviation present.   Cardiovascular: Normal rate.   Pulmonary/Chest: Effort normal. No stridor. No respiratory distress. She has no wheezes.   Musculoskeletal: Normal range of motion.   Neurological: She is alert and oriented to person, place, and time. She has normal strength. Coordination and gait normal.   Skin: Skin is warm, dry and intact. She is not  diaphoretic. No cyanosis. No pallor.   Psychiatric: She has a normal mood and affect. Her speech is normal and behavior is normal. Judgment and thought content normal. Cognition and memory are normal.   Nursing note and vitals reviewed.      Assessment:     Hearing is WNL AU w/excellent speech discrimination bilaterally    Tinnitus AU  Plan:     Reassured hearing is excellent.     Return to clinic as needed for any ENT concerns

## 2020-06-18 ENCOUNTER — OFFICE VISIT (OUTPATIENT)
Dept: HEMATOLOGY/ONCOLOGY | Facility: CLINIC | Age: 55
End: 2020-06-18
Payer: MEDICARE

## 2020-06-18 VITALS
DIASTOLIC BLOOD PRESSURE: 87 MMHG | HEART RATE: 69 BPM | BODY MASS INDEX: 30.07 KG/M2 | TEMPERATURE: 97 F | SYSTOLIC BLOOD PRESSURE: 132 MMHG | RESPIRATION RATE: 12 BRPM | WEIGHT: 192 LBS

## 2020-06-18 DIAGNOSIS — D50.0 IRON DEFICIENCY ANEMIA DUE TO CHRONIC BLOOD LOSS: Primary | ICD-10-CM

## 2020-06-18 DIAGNOSIS — L50.8 URTICARIA, CHRONIC: ICD-10-CM

## 2020-06-18 PROCEDURE — 99213 PR OFFICE/OUTPT VISIT, EST, LEVL III, 20-29 MIN: ICD-10-PCS | Mod: S$GLB,,, | Performed by: INTERNAL MEDICINE

## 2020-06-18 PROCEDURE — 3075F PR MOST RECENT SYSTOLIC BLOOD PRESS GE 130-139MM HG: ICD-10-PCS | Mod: S$GLB,,, | Performed by: INTERNAL MEDICINE

## 2020-06-18 PROCEDURE — 3079F DIAST BP 80-89 MM HG: CPT | Mod: S$GLB,,, | Performed by: INTERNAL MEDICINE

## 2020-06-18 PROCEDURE — 3008F BODY MASS INDEX DOCD: CPT | Mod: S$GLB,,, | Performed by: INTERNAL MEDICINE

## 2020-06-18 PROCEDURE — 3079F PR MOST RECENT DIASTOLIC BLOOD PRESSURE 80-89 MM HG: ICD-10-PCS | Mod: S$GLB,,, | Performed by: INTERNAL MEDICINE

## 2020-06-18 PROCEDURE — 3075F SYST BP GE 130 - 139MM HG: CPT | Mod: S$GLB,,, | Performed by: INTERNAL MEDICINE

## 2020-06-18 PROCEDURE — 99213 OFFICE O/P EST LOW 20 MIN: CPT | Mod: S$GLB,,, | Performed by: INTERNAL MEDICINE

## 2020-06-18 PROCEDURE — 3008F PR BODY MASS INDEX (BMI) DOCUMENTED: ICD-10-PCS | Mod: S$GLB,,, | Performed by: INTERNAL MEDICINE

## 2020-06-18 NOTE — PROGRESS NOTES
[facility] Hematology/Oncology  PROGRESS NOTE      Subjective:       Patient ID:   NAME: Martita San : 1965     54 y.o. female    Referring Doc: no PCP (prior PCP was Sary)  Other Physicians: Jared Schneider Potheneni, Tabor    Chief Complaint:  anemia    History of Present Illness:     Patient returns today for a regularly scheduled follow-up visit.  The patient has followed by Dr Major with dermatology and had recent skin biopsy on back which is pending per patient. She denies any CP, SOB, HA's or N/V. Everyone is doing well at home. Discussed covid19 precautions. She last showed for appointment in 2017.             ROS:   GEN: normal without any fever, night sweats or weight loss  HEENT: normal with no HA's, sore throat, stiff neck, changes in vision  CV: normal with no CP, SOB, PND, ANTON or orthopnea  PULM: normal with no SOB, cough, hemoptysis, sputum or pleuritic pain  GI: normal with no abdominal pain, nausea, vomiting, constipation, diarrhea, melanotic stools, BRBPR, or hematemesis  : normal with no hematuria, dysuria  BREAST: normal with no mass, discharge, pain  SKIN: circular raised red spot on skin     Allergies:  Review of patient's allergies indicates:   Allergen Reactions    Pcn [penicillins] Anaphylaxis    Benadryl allergy decongestant     Clindamycin Hives    Influenza virus vaccines     Latex, natural rubber Hives    Oxycodone        Medications:    Current Outpatient Medications:     albuterol (PROAIR HFA) 90 mcg/actuation inhaler, Inhale 2 puffs into the lungs every 6 (six) hours as needed for Wheezing. Rescue, Disp: 18 g, Rfl: 0    amlodipine (NORVASC) 5 MG tablet, Take 5 mg by mouth once daily., Disp: , Rfl:     cloNIDine (CATAPRES) 0.1 MG tablet, Take 0.1 mg by mouth 3 (three) times daily., Disp: , Rfl:     lisinopril (PRINIVIL,ZESTRIL) 30 MG tablet, Take 1.5 tablets (45 mg total) by mouth once daily., Disp: 30 tablet, Rfl: 0    metoprolol succinate (TOPROL-XL) 50 MG  Medication(s) Requested:    lisdexamfetamine (VYVANSE) 40 MG capsule 30 capsule 0 8/23/2019     Sig - Route: Take 1 capsule by mouth every morning. Do not start before August 23, 2019. - Oral    Sent to pharmacy as: Lisdexamfetamine Dimesylate 40 MG Oral Capsule    Class: Eprescribe    Earliest Fill Date: 8/23/2019    E-Prescribing Status: Receipt confirmed by pharmacy (8/19/2019  3:00 PM CDT)      zolpidem (AMBIEN) 10 MG tablet 30 tablet 0 9/11/2019     Sig - Route: Take 1 tablet by mouth nightly as needed for Sleep. - Oral    Sent to pharmacy as: Zolpidem Tartrate 10 MG Oral Tablet    Class: Eprescribe    E-Prescribing Status: Receipt confirmed by pharmacy (9/11/2019 11:28 AM CDT)          Last office visit: 7/16/19, NOS 9/16/19, and next appt on 10/16/19.    Last refilled: Ambien 9/11/19 & Vyvanse 8/23/19    Is the patient due for refill of this medication(s): Yes  PDMP review: Criteria met. Forwarded to Physician/MAU for signature.     Patient requests medication to be FILLED at [facility].     24 hr tablet, Take 100 mg by mouth every evening. , Disp: , Rfl:     apixaban (ELIQUIS) 5 mg Tab, Take 5 mg by mouth once daily., Disp: , Rfl:     diclofenac sodium 1 % Gel, 2 g., Disp: , Rfl:     escitalopram oxalate (LEXAPRO) 10 MG tablet, Take 1 tablet (10 mg total) by mouth once daily., Disp: 30 tablet, Rfl: 5    furosemide (LASIX) 20 MG tablet, , Disp: , Rfl:     hydrOXYzine pamoate (VISTARIL) 25 MG Cap, Take one to two capsules nightly as needed for sleep (Patient not taking: Reported on 6/18/2020), Disp: 45 capsule, Rfl: 5    methylPREDNISolone (MEDROL DOSEPACK) 4 mg tablet, use as directed (Patient not taking: Reported on 6/18/2020), Disp: 1 Package, Rfl: 0    PMHx/PSHx Updates:  See patient's last visit with me on 6/21/2017.  See H&P on 3/17/2017        Pathology:  Cancer Staging  No matching staging information was found for the patient.          Objective:     Vitals:  Blood pressure 132/87, pulse 69, temperature 97.3 °F (36.3 °C), temperature source Oral, resp. rate 12, weight 87.1 kg (192 lb).    Physical Examination:   GEN: no apparent distress, comfortable; AAOx3  HEAD: atraumatic and normocephalic  EYES: no pallor, no icterus, PERRLA  ENT: OMM, no pharyngeal erythema, external ears WNL; no nasal discharge; no thrush  NECK: no masses, thyroid normal, trachea midline, no LAD/LN's, supple  CV: RRR with no murmur; normal pulse; normal S1 and S2; no pedal edema  CHEST: Normal respiratory effort; CTAB; normal breath sounds; no wheeze or crackles  ABDOM: nontender and nondistended; soft; normal bowel sounds; no rebound/guarding  MUSC/Skeletal: ROM normal; no crepitus; joints normal; no deformities or arthropathy  EXTREM: no clubbing, cyanosis, inflammation or swelling  SKIN:  circular raised red spot on skin   : no spivey  NEURO: grossly intact; motor/sensory WNL; AAOx3; no tremors  PSYCH: normal mood, affect and behavior  LYMPH: normal cervical, supraclavicular, axillary and groin  LN's            Labs:      1/28/2020  Lab Results   Component Value Date    WBC 8.83 01/28/2020    HGB 15.1 01/28/2020    HCT 46.9 01/28/2020    MCV 93 01/28/2020     01/28/2020     BMP  Lab Results   Component Value Date     01/28/2020    K 4.1 01/28/2020     01/28/2020    CO2 28 01/28/2020    BUN 13 01/28/2020    CREATININE 0.7 01/28/2020    CALCIUM 9.4 01/28/2020    ANIONGAP 10 01/28/2020    ESTGFRAFRICA >60.0 01/28/2020    EGFRNONAA >60.0 01/28/2020     Lab Results   Component Value Date    LEXENJVM36 489 12/27/2016     Lab Results   Component Value Date    FOLATE 8.5 12/27/2016     Lab Results   Component Value Date    IRON 68 11/15/2017    TIBC 289 11/15/2017    FERRITIN 54 11/15/2017               Radiology/Diagnostic Studies:    No results found.    I have reviewed all available lab results and radiology reports.    Assessment/Plan:   (1) 54 y.o. female with diagnosis of microcytic anemia  - iron deficiency anemia  - she had been on oral iron supplements previously and then received IV iron through May 2017  - she has been followed by Dr Schneider with GI  - s/p scopes with hiatal hernia, stomach ulcers and H.pylori positive  - capsule endoscopy per patient was fine  - latest hgb 15.1 and good  - iron levels adequate currently     (2) chronic pain syndrome with chronic neck and back issues  - followed by Dr Loco with Pain management     (3) HTN - followed by her PCP     (4) fibromyalgia and arthritis - followed by Dr Coleman with Rheum     (5) prior alcohol abuse issues    (6) Skin spots  - followed by Dr Major with derm; she had biopsy on back done recently with pathology pending        PLAN:  1. Refer to Allergy?immunology for the urticarial like lesion  2. continued on prilosec now per GI  3. Check labs every 6 months  4. MVI's  5. Await pathology report from derm  6. RTC 6 months    F/U with PCP, GI, Derm and Rheum  Fax note to Sary Schneider P. Sedrish, Nickolas,  Sharath       Discussion:     I have explained all of the above in detail and the patient understands all of the current recommendation(s). I have answered all of their questions to the best of my ability and to their complete satisfaction.   The patient is to continue with the current management plan.            Electronically signed by Sukhdeep Rocha MD

## 2020-06-22 ENCOUNTER — OFFICE VISIT (OUTPATIENT)
Dept: ALLERGY | Facility: CLINIC | Age: 55
End: 2020-06-22
Payer: MEDICARE

## 2020-06-22 VITALS
WEIGHT: 185 LBS | BODY MASS INDEX: 29.03 KG/M2 | DIASTOLIC BLOOD PRESSURE: 90 MMHG | TEMPERATURE: 97 F | OXYGEN SATURATION: 97 % | SYSTOLIC BLOOD PRESSURE: 147 MMHG | HEART RATE: 70 BPM | HEIGHT: 67 IN

## 2020-06-22 DIAGNOSIS — L27.1 FIXED DRUG ERUPTION: ICD-10-CM

## 2020-06-22 DIAGNOSIS — L92.0 GRANULOMA ANNULARE: Primary | ICD-10-CM

## 2020-06-22 PROCEDURE — 99203 OFFICE O/P NEW LOW 30 MIN: CPT | Mod: S$GLB,,, | Performed by: ALLERGY & IMMUNOLOGY

## 2020-06-22 PROCEDURE — 3080F PR MOST RECENT DIASTOLIC BLOOD PRESSURE >= 90 MM HG: ICD-10-PCS | Mod: S$GLB,,, | Performed by: ALLERGY & IMMUNOLOGY

## 2020-06-22 PROCEDURE — 3077F PR MOST RECENT SYSTOLIC BLOOD PRESSURE >= 140 MM HG: ICD-10-PCS | Mod: S$GLB,,, | Performed by: ALLERGY & IMMUNOLOGY

## 2020-06-22 PROCEDURE — 3008F BODY MASS INDEX DOCD: CPT | Mod: S$GLB,,, | Performed by: ALLERGY & IMMUNOLOGY

## 2020-06-22 PROCEDURE — 3077F SYST BP >= 140 MM HG: CPT | Mod: S$GLB,,, | Performed by: ALLERGY & IMMUNOLOGY

## 2020-06-22 PROCEDURE — 3008F PR BODY MASS INDEX (BMI) DOCUMENTED: ICD-10-PCS | Mod: S$GLB,,, | Performed by: ALLERGY & IMMUNOLOGY

## 2020-06-22 PROCEDURE — 99203 PR OFFICE/OUTPT VISIT, NEW, LEVL III, 30-44 MIN: ICD-10-PCS | Mod: S$GLB,,, | Performed by: ALLERGY & IMMUNOLOGY

## 2020-06-22 PROCEDURE — 3080F DIAST BP >= 90 MM HG: CPT | Mod: S$GLB,,, | Performed by: ALLERGY & IMMUNOLOGY

## 2020-06-22 RX ORDER — METOPROLOL SUCCINATE 100 MG/1
100 TABLET, EXTENDED RELEASE ORAL DAILY
COMMUNITY
Start: 2020-05-23 | End: 2020-10-27 | Stop reason: SDUPTHER

## 2020-06-22 RX ORDER — TRIAMCINOLONE ACETONIDE 1 MG/G
CREAM TOPICAL
COMMUNITY
Start: 2020-06-16 | End: 2020-06-22

## 2020-06-22 RX ORDER — LISINOPRIL 40 MG/1
40 TABLET ORAL DAILY
COMMUNITY
Start: 2020-04-17 | End: 2020-10-27 | Stop reason: SDUPTHER

## 2020-06-22 RX ORDER — TRIAMCINOLONE ACETONIDE 5 MG/G
CREAM TOPICAL 2 TIMES DAILY
Qty: 30 G | Refills: 2 | Status: SHIPPED | OUTPATIENT
Start: 2020-06-22 | End: 2021-08-04

## 2020-06-22 RX ORDER — NITROGLYCERIN 0.4 MG/1
0.4 TABLET SUBLINGUAL
COMMUNITY
Start: 2017-01-09 | End: 2021-08-04

## 2020-06-22 NOTE — PROGRESS NOTES
"Subjective:       Patient ID: Martita San is a 54 y.o. female.    Chief Complaint: Urticaria (referred by  for hives, upper torso. Saw -dermatology, he called her today and said its an allergy to something, possibly meds)    HPI     Pt presents as a consult from Dr. Sukhdeep Rocha for suspected urticaria.     Onset: 2019  Location: chest , hair , face   Sx: raised, hot, bumps like blisters  Sx frequency: every few months and may last a few months.   Treatment: baez 0.1% 2-3 times per day.   Seeing Dr. Major and has bx showed "arthropod bite."     Of note, she does have a positive illicit drug history of THC.     No pmh of lupus     Review of Systems    General: neg unexpected weight changes, fevers, chills, night sweats, malaise  HEENT: see hpi, Neg eye pain, vision changes, ear drainage, nose bleeds, throat tightness, sores in the mouth  CV: Neg chest pain, palpitations, swelling  Resp: see hpi, neg shortness of breath, hemoptysis, cough  GI: see hpi, neg dysphagia, night abdominal pain, reflux, chronic diarrhea, chronic constipation  Derm: See Hpi, neg flushing  Mu/sk: Neg joint pain, joint swelling   Psych: Neg anxiety  neuro: neg chronic headaches, muscle weakness  Endo: neg heat/cold intolerance, chronic fatigue    Objective:     Vitals:    06/22/20 1405   BP: (!) 147/90   Pulse: 70   Temp: 97.2 °F (36.2 °C)   TempSrc: Temporal   SpO2: 97%   Weight: 83.9 kg (185 lb)   Height: 5' 7" (1.702 m)        Physical Exam    General: no acute distress, well developed well nourished   HEENT:   Skin: recurrent well circumscribed rash, non urticarial. On chest x 3.   Lymph: neg supraclavicular, axillary     Assessment:       1. Granuloma annulare    2. Fixed drug eruption        Plan:       Granuloma annulare  -     triamcinolone acetonide 0.5% (KENALOG) 0.5 % Crea; Apply topically 2 (two) times daily.  Dispense: 30 g; Refill: 2    Fixed drug eruption  -     Ambulatory referral/consult to Allergy  -    "  triamcinolone acetonide 0.5% (KENALOG) 0.5 % Crea; Apply topically 2 (two) times daily.  Dispense: 30 g; Refill: 2        discussed with patient fixed drug eruption vs granuloma annulare to what the rash appears to me.   No ige mediated test can be performed for this. Pt agrees with pictures of fixed drug eruption vs gran annulare.     Pt to have each drug d/c x 6 weeks to see if this alleviates this vs granuloma annulare potentially and continue with potent topical steroids.   Increase to 0.5 % baez.     Continue with derm and cardiology and heme/onc.     Follow up 6 months, sooner if needed        Sheryl Early M.D.  Allergy/Immunology  [facility]   [phone] phone  [phone] fax

## 2020-06-22 NOTE — LETTER
June 22, 2020      Sukhdeep Rocha MD  [address]  [address]  [place] [address]           [facility] - Allergy  [address]  [address]  [place] [address]  Phone: [phone]  Fax: [phone]          Patient: Martita San   MR Number: [id number]   YOB: 1965   Date of Visit: 6/22/2020       Dear Dr. Sukhdeep Rocha:    Thank you for referring Martita San to me for evaluation. Attached you will find relevant portions of my assessment and plan of care.    If you have questions, please do not hesitate to call me. I look forward to following Martita San along with you.    Sincerely,    Sheryl Early MD    Enclosure  CC:  No Recipients    If you would like to receive this communication electronically, please contact [email or web address] or [phone] to request more information on [facility] [employer] access.    For providers and/or their staff who would like to refer a patient to [facility], please contact us through our one-stop-shop provider referral line, [facility], at [phone].    If you feel you have received this communication in error or would no longer like to receive these types of communications, please e-mail [email or web address]

## 2020-07-28 ENCOUNTER — HOSPITAL ENCOUNTER (EMERGENCY)
Facility: HOSPITAL | Age: 55
Discharge: HOME | End: 2020-07-28
Attending: EMERGENCY MEDICINE
Payer: MEDICARE

## 2020-07-28 VITALS
TEMPERATURE: 98 F | RESPIRATION RATE: 18 BRPM | WEIGHT: 187 LBS | DIASTOLIC BLOOD PRESSURE: 84 MMHG | SYSTOLIC BLOOD PRESSURE: 133 MMHG | OXYGEN SATURATION: 99 % | BODY MASS INDEX: 29.35 KG/M2 | HEIGHT: 67 IN | HEART RATE: 52 BPM

## 2020-07-28 DIAGNOSIS — R20.2 PARESTHESIAS: ICD-10-CM

## 2020-07-28 DIAGNOSIS — R20.0 NUMBNESS: Primary | ICD-10-CM

## 2020-07-28 LAB
ALBUMIN SERPL BCP-MCNC: 3.9 G/DL (ref 3.5–5.2)
ALP SERPL-CCNC: 56 U/L (ref 55–135)
ALT SERPL W/O P-5'-P-CCNC: 21 U/L (ref 10–44)
ANION GAP SERPL CALC-SCNC: 12 MMOL/L (ref 8–16)
AST SERPL-CCNC: 16 U/L (ref 10–40)
BASOPHILS # BLD AUTO: 0.1 K/UL (ref 0–0.2)
BASOPHILS NFR BLD: 1.5 % (ref 0–1.9)
BILIRUB SERPL-MCNC: 0.5 MG/DL (ref 0.1–1)
BILIRUB UR QL STRIP: NEGATIVE
BUN SERPL-MCNC: 19 MG/DL (ref 6–20)
CALCIUM SERPL-MCNC: 9.1 MG/DL (ref 8.7–10.5)
CHLORIDE SERPL-SCNC: 101 MMOL/L (ref 95–110)
CLARITY UR: CLEAR
CO2 SERPL-SCNC: 27 MMOL/L (ref 23–29)
COLOR UR: YELLOW
CREAT SERPL-MCNC: 1 MG/DL (ref 0.5–1.4)
DIFFERENTIAL METHOD: ABNORMAL
EOSINOPHIL # BLD AUTO: 0.9 K/UL (ref 0–0.5)
EOSINOPHIL NFR BLD: 13.5 % (ref 0–8)
ERYTHROCYTE [DISTWIDTH] IN BLOOD BY AUTOMATED COUNT: 13.3 % (ref 11.5–14.5)
EST. GFR  (AFRICAN AMERICAN): >60 ML/MIN/1.73 M^2
EST. GFR  (NON AFRICAN AMERICAN): >60 ML/MIN/1.73 M^2
GLUCOSE SERPL-MCNC: 94 MG/DL (ref 70–110)
GLUCOSE UR QL STRIP: NEGATIVE
HCT VFR BLD AUTO: 40.9 % (ref 37–48.5)
HGB BLD-MCNC: 13 G/DL (ref 12–16)
HGB UR QL STRIP: NEGATIVE
IMM GRANULOCYTES # BLD AUTO: 0.02 K/UL (ref 0–0.04)
IMM GRANULOCYTES NFR BLD AUTO: 0.3 % (ref 0–0.5)
KETONES UR QL STRIP: NEGATIVE
LEUKOCYTE ESTERASE UR QL STRIP: NEGATIVE
LYMPHOCYTES # BLD AUTO: 1.9 K/UL (ref 1–4.8)
LYMPHOCYTES NFR BLD: 29.1 % (ref 18–48)
MCH RBC QN AUTO: 29.9 PG (ref 27–31)
MCHC RBC AUTO-ENTMCNC: 31.8 G/DL (ref 32–36)
MCV RBC AUTO: 94 FL (ref 82–98)
MONOCYTES # BLD AUTO: 0.6 K/UL (ref 0.3–1)
MONOCYTES NFR BLD: 9.1 % (ref 4–15)
NEUTROPHILS # BLD AUTO: 3.1 K/UL (ref 1.8–7.7)
NEUTROPHILS NFR BLD: 46.5 % (ref 38–73)
NITRITE UR QL STRIP: NEGATIVE
NRBC BLD-RTO: 0 /100 WBC
PH UR STRIP: 6 [PH] (ref 5–8)
PLATELET # BLD AUTO: 176 K/UL (ref 150–350)
PMV BLD AUTO: 10.8 FL (ref 9.2–12.9)
POTASSIUM SERPL-SCNC: 3.5 MMOL/L (ref 3.5–5.1)
PROT SERPL-MCNC: 6.6 G/DL (ref 6–8.4)
PROT UR QL STRIP: ABNORMAL
RBC # BLD AUTO: 4.35 M/UL (ref 4–5.4)
SODIUM SERPL-SCNC: 140 MMOL/L (ref 136–145)
SP GR UR STRIP: 1.02 (ref 1–1.03)
TROPONIN I SERPL DL<=0.01 NG/ML-MCNC: <0.03 NG/ML
URN SPEC COLLECT METH UR: ABNORMAL
UROBILINOGEN UR STRIP-ACNC: ABNORMAL EU/DL
WBC # BLD AUTO: 6.6 K/UL (ref 3.9–12.7)

## 2020-07-28 PROCEDURE — 85025 COMPLETE CBC W/AUTO DIFF WBC: CPT

## 2020-07-28 PROCEDURE — 36415 COLL VENOUS BLD VENIPUNCTURE: CPT

## 2020-07-28 PROCEDURE — 25000003 PHARM REV CODE 250: Performed by: EMERGENCY MEDICINE

## 2020-07-28 PROCEDURE — 99284 EMERGENCY DEPT VISIT MOD MDM: CPT | Mod: 25

## 2020-07-28 PROCEDURE — 81003 URINALYSIS AUTO W/O SCOPE: CPT

## 2020-07-28 PROCEDURE — 84484 ASSAY OF TROPONIN QUANT: CPT

## 2020-07-28 PROCEDURE — 80053 COMPREHEN METABOLIC PANEL: CPT

## 2020-07-28 RX ORDER — IBUPROFEN 600 MG/1
600 TABLET ORAL EVERY 6 HOURS PRN
Qty: 20 TABLET | Refills: 0 | Status: SHIPPED | OUTPATIENT
Start: 2020-07-28

## 2020-07-28 RX ORDER — METHOCARBAMOL 500 MG/1
1000 TABLET, FILM COATED ORAL
Status: COMPLETED | OUTPATIENT
Start: 2020-07-28 | End: 2020-07-28

## 2020-07-28 RX ORDER — METHOCARBAMOL 500 MG/1
1000 TABLET, FILM COATED ORAL 3 TIMES DAILY
Qty: 30 TABLET | Refills: 0 | Status: SHIPPED | OUTPATIENT
Start: 2020-07-28 | End: 2020-08-02

## 2020-07-28 RX ADMIN — IBUPROFEN 600 MG: 200 TABLET, FILM COATED ORAL at 01:07

## 2020-07-28 RX ADMIN — METHOCARBAMOL 1000 MG: 500 TABLET ORAL at 01:07

## 2020-07-28 NOTE — ED PROVIDER NOTES
Encounter Date: 2020       History     Chief Complaint   Patient presents with    Numbness     L arm x2 days     54-year-old female past medical history of COPD, fibromyalgia, PTSD, thyroid disease presents with left upper extremity numbness and pain.  Patient states she developed acute onset of pain x2 days then radiates down her arm into numbness.  She states he has never had pain like this previously.  Patient does report episode of sweating at the time as well.  She denies any chest pain, shortness of breath, fever, chills.  She is otherwise stable and has other complaints.        Review of patient's allergies indicates:   Allergen Reactions    Clindamycin Hives    Pcn [penicillins] Anaphylaxis    Benadryl allergy decongestant     Diphenhydramine hcl Hives    Influenza virus vaccines     Latex, natural rubber Hives    Oxycodone     Adhesive Rash     Past Medical History:   Diagnosis Date    Allergy     Anxiety     Carotid stenosis 2016    Chronic anemia     COPD (chronic obstructive pulmonary disease)     Depression     Fibromyalgia     Osteoarthritis     Pinched nerve     PTSD (post-traumatic stress disorder)     Tachycardia     Thyroid disease     Torn rotator cuff     Uterine fibroid 10/2016    Vertigo      Past Surgical History:   Procedure Laterality Date     SECTION      HYSTERECTOMY      LAMINECTOMY      TONSILLECTOMY       Family History   Problem Relation Age of Onset    Arthritis Mother     Hypertension Mother     COPD Mother     Depression Mother     Arthritis Father     Depression Father     Hypertension Father     Heart disease Father     COPD Father     Heart attack Father     Asthma Father     Heart attack Maternal Grandmother     Cancer Maternal Grandmother     Hypertension Maternal Grandmother     Hypertension Paternal Grandmother     Stroke Paternal Grandmother     Tuberculosis Paternal Grandfather      Social History     Tobacco Use     Smoking status: Never Smoker    Smokeless tobacco: Never Used   Substance Use Topics    Alcohol use: No    Drug use: Yes     Types: Marijuana     Review of Systems   Constitutional: Negative for fever.   HENT: Negative for sore throat.    Respiratory: Negative for shortness of breath.    Cardiovascular: Negative for chest pain.   Gastrointestinal: Negative for nausea.   Genitourinary: Negative for dysuria.   Musculoskeletal: Positive for arthralgias ( left arm pain). Negative for back pain.   Skin: Negative for rash.   Neurological: Positive for numbness (Left upper extremity). Negative for weakness.   Hematological: Does not bruise/bleed easily.   All other systems reviewed and are negative.      Physical Exam     Initial Vitals   BP Pulse Resp Temp SpO2   07/28/20 1215 07/28/20 1215 07/28/20 1215 07/28/20 1218 07/28/20 1215   (!) 194/114 70 18 97.9 °F (36.6 °C) 99 %      MAP       --                Physical Exam    Nursing note and vitals reviewed.  Constitutional: She appears well-developed and well-nourished. No distress.   HENT:   Head: Normocephalic and atraumatic.   Mouth/Throat: Oropharynx is clear and moist.   Eyes: Conjunctivae and EOM are normal. Pupils are equal, round, and reactive to light.   Neck: Normal range of motion. No tracheal deviation present. No JVD present.   Cardiovascular: Normal rate, regular rhythm, normal heart sounds and intact distal pulses. Exam reveals no gallop and no friction rub.    No murmur heard.  Pulmonary/Chest: Breath sounds normal. No respiratory distress. She has no wheezes. She exhibits no tenderness.   Abdominal: Soft. Bowel sounds are normal. She exhibits no distension. There is no abdominal tenderness. There is no rebound and no guarding.   Musculoskeletal: Normal range of motion. Tenderness (Left upper extremity) present. No edema.   Neurological: She is alert and oriented to person, place, and time. She has normal strength. No cranial nerve deficit or sensory  deficit.   Skin: Skin is warm and dry. Capillary refill takes less than 2 seconds. No erythema.   Psychiatric: She has a normal mood and affect.         ED Course   Procedures  Labs Reviewed   CBC W/ AUTO DIFFERENTIAL - Abnormal; Notable for the following components:       Result Value    Mean Corpuscular Hemoglobin Conc 31.8 (*)     Eos # 0.9 (*)     Eosinophil% 13.5 (*)     All other components within normal limits   COMPREHENSIVE METABOLIC PANEL   URINALYSIS, REFLEX TO URINE CULTURE   TROPONIN I          Imaging Results    None          Medical Decision Making:   Initial Assessment:   54-year-old female on initial assessment in mild distress secondary to left upper extremity numbness.  Patient is alert oriented x3, neurologically neurovascular intact with no focal deficits.  She is nontoxic-appearing and vitals are stable at this time.  Differential Diagnosis:   MI, neuropathy, hematoma, contusion  Independently Interpreted Test(s):   I have ordered and independently interpreted X-rays - see prior notes.  Clinical Tests:   Lab Tests: Ordered and Reviewed  The following lab test(s) were unremarkable: Urinalysis  Radiological Study: Ordered and Reviewed  ED Management:  Patient has been reassessed noted to have no acute changes condition.  Labs images unremarkable for any acute pathology requiring hospital admission or further treatment at this time.  Patient reports mild symptomatic improvement after given Robaxin while in the ED. Patient has remained stable while in the ED and discharged home stable condition with follow-up as needed.  Ms. San is aware of the plan and in agreement with discharge.                                 Clinical Impression:       ICD-10-CM ICD-9-CM   1. Numbness  R20.0 782.0   2. Paresthesias  R20.2 782.0                                Shar Reyna MD  07/28/20 5640

## 2020-08-05 ENCOUNTER — LAB VISIT (OUTPATIENT)
Dept: LAB | Facility: HOSPITAL | Age: 55
End: 2020-08-05
Attending: INTERNAL MEDICINE
Payer: MEDICARE

## 2020-08-05 DIAGNOSIS — D50.0 IRON DEFICIENCY ANEMIA DUE TO CHRONIC BLOOD LOSS: ICD-10-CM

## 2020-08-05 LAB
ALBUMIN SERPL BCP-MCNC: 4 G/DL (ref 3.5–5.2)
ALP SERPL-CCNC: 56 U/L (ref 55–135)
ALT SERPL W/O P-5'-P-CCNC: 18 U/L (ref 10–44)
ANION GAP SERPL CALC-SCNC: 8 MMOL/L (ref 8–16)
AST SERPL-CCNC: 13 U/L (ref 10–40)
BASOPHILS # BLD AUTO: 0.11 K/UL (ref 0–0.2)
BASOPHILS NFR BLD: 1.7 % (ref 0–1.9)
BILIRUB SERPL-MCNC: 0.6 MG/DL (ref 0.1–1)
BUN SERPL-MCNC: 15 MG/DL (ref 6–20)
CALCIUM SERPL-MCNC: 9.4 MG/DL (ref 8.7–10.5)
CHLORIDE SERPL-SCNC: 108 MMOL/L (ref 95–110)
CO2 SERPL-SCNC: 26 MMOL/L (ref 23–29)
CREAT SERPL-MCNC: 0.8 MG/DL (ref 0.5–1.4)
DIFFERENTIAL METHOD: ABNORMAL
EOSINOPHIL # BLD AUTO: 1.2 K/UL (ref 0–0.5)
EOSINOPHIL NFR BLD: 17.5 % (ref 0–8)
ERYTHROCYTE [DISTWIDTH] IN BLOOD BY AUTOMATED COUNT: 13.8 % (ref 11.5–14.5)
EST. GFR  (AFRICAN AMERICAN): >60 ML/MIN/1.73 M^2
EST. GFR  (NON AFRICAN AMERICAN): >60 ML/MIN/1.73 M^2
FERRITIN SERPL-MCNC: 32 NG/ML (ref 20–300)
GLUCOSE SERPL-MCNC: 95 MG/DL (ref 70–110)
HCT VFR BLD AUTO: 42.3 % (ref 37–48.5)
HGB BLD-MCNC: 14.1 G/DL (ref 12–16)
IMM GRANULOCYTES # BLD AUTO: 0.01 K/UL (ref 0–0.04)
IMM GRANULOCYTES NFR BLD AUTO: 0.2 % (ref 0–0.5)
IRON SERPL-MCNC: 54 UG/DL (ref 30–160)
LYMPHOCYTES # BLD AUTO: 1.7 K/UL (ref 1–4.8)
LYMPHOCYTES NFR BLD: 25.2 % (ref 18–48)
MCH RBC QN AUTO: 30.7 PG (ref 27–31)
MCHC RBC AUTO-ENTMCNC: 33.3 G/DL (ref 32–36)
MCV RBC AUTO: 92 FL (ref 82–98)
MONOCYTES # BLD AUTO: 0.6 K/UL (ref 0.3–1)
MONOCYTES NFR BLD: 9.3 % (ref 4–15)
NEUTROPHILS # BLD AUTO: 3 K/UL (ref 1.8–7.7)
NEUTROPHILS NFR BLD: 46.1 % (ref 38–73)
NRBC BLD-RTO: 0 /100 WBC
PLATELET # BLD AUTO: 184 K/UL (ref 150–350)
PMV BLD AUTO: 10.2 FL (ref 9.2–12.9)
POTASSIUM SERPL-SCNC: 4.1 MMOL/L (ref 3.5–5.1)
PROT SERPL-MCNC: 6.7 G/DL (ref 6–8.4)
RBC # BLD AUTO: 4.6 M/UL (ref 4–5.4)
SATURATED IRON: 19 % (ref 20–50)
SODIUM SERPL-SCNC: 142 MMOL/L (ref 136–145)
TOTAL IRON BINDING CAPACITY: 286 UG/DL (ref 250–450)
TRANSFERRIN SERPL-MCNC: 204 MG/DL (ref 200–375)
WBC # BLD AUTO: 6.58 K/UL (ref 3.9–12.7)

## 2020-08-05 PROCEDURE — 83540 ASSAY OF IRON: CPT

## 2020-08-05 PROCEDURE — 85025 COMPLETE CBC W/AUTO DIFF WBC: CPT

## 2020-08-05 PROCEDURE — 36415 COLL VENOUS BLD VENIPUNCTURE: CPT

## 2020-08-05 PROCEDURE — 82728 ASSAY OF FERRITIN: CPT

## 2020-08-05 PROCEDURE — 80053 COMPREHEN METABOLIC PANEL: CPT

## 2020-10-27 RX ORDER — FUROSEMIDE 20 MG/1
20 TABLET ORAL DAILY
COMMUNITY
End: 2020-10-27 | Stop reason: SDUPTHER

## 2020-10-30 RX ORDER — FUROSEMIDE 20 MG/1
20 TABLET ORAL DAILY
Qty: 90 TABLET | Refills: 3 | Status: SHIPPED | OUTPATIENT
Start: 2020-10-30 | End: 2021-10-04 | Stop reason: SDUPTHER

## 2020-10-30 RX ORDER — METOPROLOL SUCCINATE 100 MG/1
100 TABLET, EXTENDED RELEASE ORAL DAILY
Qty: 90 TABLET | Refills: 3 | Status: SHIPPED | OUTPATIENT
Start: 2020-10-30 | End: 2021-08-05 | Stop reason: SDUPTHER

## 2020-10-30 RX ORDER — AMLODIPINE BESYLATE 10 MG/1
10 TABLET ORAL DAILY
Qty: 90 TABLET | Refills: 3 | Status: SHIPPED | OUTPATIENT
Start: 2020-10-30 | End: 2021-08-05 | Stop reason: SDUPTHER

## 2020-10-30 RX ORDER — LISINOPRIL 40 MG/1
40 TABLET ORAL DAILY
Qty: 90 TABLET | Refills: 3 | Status: SHIPPED | OUTPATIENT
Start: 2020-10-30 | End: 2021-10-04 | Stop reason: SDUPTHER

## 2020-12-14 ENCOUNTER — DOCUMENTATION ONLY (OUTPATIENT)
Dept: ALLERGY | Facility: CLINIC | Age: 55
End: 2020-12-14

## 2020-12-14 DIAGNOSIS — Z91.199 NO-SHOW FOR APPOINTMENT: Primary | ICD-10-CM

## 2020-12-14 NOTE — PROGRESS NOTES
No show office visit 12-        Sheryl Early M.D.  Allergy/Immunology  [facility]   [phone] phone  [phone] fax

## 2020-12-15 NOTE — PROGRESS NOTES
[facility] Hematology/Oncology  PROGRESS NOTE      Subjective:       Patient ID:   NAME: Martita San : 1965     55 y.o. female    Referring Doc: no PCP (prior PCP was Sary)  Other Physicians: Jared Schneider Potheneni, Tabor    Chief Complaint:  anemia    History of Present Illness:     Patient returns today for a regularly scheduled follow-up visit.  The patient has followed by Dr Major with dermatology and had prior skin biopsy on back which is an allergic dermatitis per patient. She denies any CP, SOB, HA's or N/V.    She saw Dr Early with allergy/immunology last on 2020     Everyone is doing well at home.     Discussed covid19 precautions.           ROS:   GEN: normal without any fever, night sweats or weight loss  HEENT: normal with no HA's, sore throat, stiff neck, changes in vision  CV: normal with no CP, SOB, PND, ANTON or orthopnea  PULM: normal with no SOB, cough, hemoptysis, sputum or pleuritic pain  GI: normal with no abdominal pain, nausea, vomiting, constipation, diarrhea, melanotic stools, BRBPR, or hematemesis  : normal with no hematuria, dysuria  BREAST: normal with no mass, discharge, pain  SKIN: circular raised red spot on skin     Allergies:  Review of patient's allergies indicates:   Allergen Reactions    Pcn [penicillins] Anaphylaxis    Benadryl allergy decongestant     Clindamycin Hives    Influenza virus vaccines     Latex, natural rubber Hives    Oxycodone        Medications:    Current Outpatient Medications:     albuterol (PROAIR HFA) 90 mcg/actuation inhaler, Inhale 2 puffs into the lungs every 6 (six) hours as needed for Wheezing. Rescue, Disp: 18 g, Rfl: 0    amLODIPine (NORVASC) 10 MG tablet, Take 1 tablet (10 mg total) by mouth once daily., Disp: 90 tablet, Rfl: 3    apixaban (ELIQUIS) 5 mg Tab, Take 5 mg by mouth once daily., Disp: , Rfl:     cloNIDine (CATAPRES) 0.1 MG tablet, Take 0.1 mg by mouth 3 (three) times daily., Disp: , Rfl:     diclofenac  sodium 1 % Gel, Apply 2 g topically 4 (four) times daily. , Disp: , Rfl:     furosemide (LASIX) 20 MG tablet, Take 1 tablet (20 mg total) by mouth once daily., Disp: 90 tablet, Rfl: 3    ibuprofen (ADVIL,MOTRIN) 600 MG tablet, Take 1 tablet (600 mg total) by mouth every 6 (six) hours as needed., Disp: 20 tablet, Rfl: 0    lisinopriL (PRINIVIL,ZESTRIL) 40 MG tablet, Take 1 tablet (40 mg total) by mouth once daily., Disp: 90 tablet, Rfl: 3    metoprolol succinate (TOPROL-XL) 100 MG 24 hr tablet, Take 1 tablet (100 mg total) by mouth once daily., Disp: 90 tablet, Rfl: 3    nitroGLYCERIN (NITROSTAT) 0.4 MG SL tablet, Place 0.4 mg under the tongue., Disp: , Rfl:     triamcinolone acetonide 0.5% (KENALOG) 0.5 % Crea, Apply topically 2 (two) times daily. (Patient taking differently: Apply 1 application topically 2 (two) times daily. ), Disp: 30 g, Rfl: 2    PMHx/PSHx Updates:  See patient's last visit with me on 6/18/2020.  See H&P on 3/17/2017        Pathology:  Cancer Staging  No matching staging information was found for the patient.          Objective:     Vitals:  Blood pressure (!) 127/92, pulse 73, resp. rate 18, weight 87.9 kg (193 lb 11.2 oz).    Physical Examination:   GEN: no apparent distress, comfortable; AAOx3  HEAD: atraumatic and normocephalic  EYES: no pallor, no icterus, PERRLA  ENT: OMM, no pharyngeal erythema, external ears WNL; no nasal discharge; no thrush  NECK: no masses, thyroid normal, trachea midline, no LAD/LN's, supple  CV: RRR with no murmur; normal pulse; normal S1 and S2; no pedal edema  CHEST: Normal respiratory effort; CTAB; normal breath sounds; no wheeze or crackles  ABDOM: nontender and nondistended; soft; normal bowel sounds; no rebound/guarding  MUSC/Skeletal: ROM normal; no crepitus; joints normal; no deformities or arthropathy  EXTREM: no clubbing, cyanosis, inflammation or swelling  SKIN: no new lesions or areas of concern ; rash since resolved  : no spivey  NEURO: grossly  intact; motor/sensory WNL; AAOx3; no tremors  PSYCH: normal mood, affect and behavior  LYMPH: normal cervical, supraclavicular, axillary and groin LN's            Labs:         Lab Results   Component Value Date    WBC 6.58 08/05/2020    HGB 14.1 08/05/2020    HCT 42.3 08/05/2020    MCV 92 08/05/2020     08/05/2020     BMP  Lab Results   Component Value Date     08/05/2020    K 4.1 08/05/2020     08/05/2020    CO2 26 08/05/2020    BUN 15 08/05/2020    CREATININE 0.8 08/05/2020    CALCIUM 9.4 08/05/2020    ANIONGAP 8 08/05/2020    ESTGFRAFRICA >60.0 08/05/2020    EGFRNONAA >60.0 08/05/2020     Lab Results   Component Value Date    MFDHXRLH36 489 12/27/2016     Lab Results   Component Value Date    FOLATE 8.5 12/27/2016     Lab Results   Component Value Date    IRON 54 08/05/2020    TIBC 286 08/05/2020    FERRITIN 32 08/05/2020               Radiology/Diagnostic Studies:    No results found.    I have reviewed all available lab results and radiology reports.    Assessment/Plan:   (1) 54 y.o. female with diagnosis of microcytic anemia  - iron deficiency anemia  - she had been on oral iron supplements previously and then received IV iron through May 2017  - she has been followed by Dr Schneider with GI  - s/p scopes with hiatal hernia, stomach ulcers and H.pylori positive  - capsule endoscopy per patient was fine    12/17/2020:  - latest hgb 14.1 and good  - iron levels adequate currently     (2) chronic pain syndrome with chronic neck and back issues  - followed by Dr Loco with Pain management     (3) HTN - followed by her PCP     (4) fibromyalgia and arthritis - followed by Dr Coleman with Rheum     (5) prior alcohol abuse issues    (6) Skin spots  - followed by Dr Major with derm; she had biopsy on back done recently with pathology showing allergic dermatologic  - seeing Dr Early with AI too        PLAN:  1. F/u with allergy/immunology and dermatology  2. continued on prilosec now per GI  3.  Check labs every 6 months  4. MVI's  5.  RTC 6 months    F/U with PCP, GI, Derm and Rheum  Fax note to Sary Schneider P. Sedrish, Potheneni, Olivier       Discussion:       COVID-19 Discussion:    I had long discussion with patient and any applicable family about the COVID-19 coronavirus epidemic and the recommended precautions with regard to cancer and/or hematology patients. I have re-iterated the CDC recommendations for adequate hand washing, use of hand -like products, and coughing into elbow, etc. In addition, especially for our patients who are on chemotherapy and/or our otherwise immunocompromised patients, I have recommended avoidance of crowds, including movie theaters, restaurants, churches, etc. I have recommended avoidance of any sick or symptomatic family members and/or friends. I have also recommended avoidance of any raw and unwashed food products, and general avoidance of food items that have not been prepared by themselves. The patient has been asked to call us immediately with any symptom developments, issues, questions or other general concerns.     I have explained all of the above in detail and the patient understands all of the current recommendation(s). I have answered all of their questions to the best of my ability and to their complete satisfaction.   The patient is to continue with the current management plan.            Electronically signed by Sukhdeep Rocha MD

## 2020-12-17 ENCOUNTER — OFFICE VISIT (OUTPATIENT)
Dept: HEMATOLOGY/ONCOLOGY | Facility: CLINIC | Age: 55
End: 2020-12-17
Payer: MEDICARE

## 2020-12-17 VITALS
SYSTOLIC BLOOD PRESSURE: 127 MMHG | RESPIRATION RATE: 18 BRPM | HEART RATE: 73 BPM | BODY MASS INDEX: 30.34 KG/M2 | DIASTOLIC BLOOD PRESSURE: 92 MMHG | WEIGHT: 193.69 LBS

## 2020-12-17 DIAGNOSIS — D50.0 IRON DEFICIENCY ANEMIA DUE TO CHRONIC BLOOD LOSS: Primary | ICD-10-CM

## 2020-12-17 PROCEDURE — 1125F AMNT PAIN NOTED PAIN PRSNT: CPT | Mod: S$GLB,,, | Performed by: INTERNAL MEDICINE

## 2020-12-17 PROCEDURE — 3080F PR MOST RECENT DIASTOLIC BLOOD PRESSURE >= 90 MM HG: ICD-10-PCS | Mod: S$GLB,,, | Performed by: INTERNAL MEDICINE

## 2020-12-17 PROCEDURE — 99213 OFFICE O/P EST LOW 20 MIN: CPT | Mod: S$GLB,,, | Performed by: INTERNAL MEDICINE

## 2020-12-17 PROCEDURE — 3008F BODY MASS INDEX DOCD: CPT | Mod: S$GLB,,, | Performed by: INTERNAL MEDICINE

## 2020-12-17 PROCEDURE — 99213 PR OFFICE/OUTPT VISIT, EST, LEVL III, 20-29 MIN: ICD-10-PCS | Mod: S$GLB,,, | Performed by: INTERNAL MEDICINE

## 2020-12-17 PROCEDURE — 3080F DIAST BP >= 90 MM HG: CPT | Mod: S$GLB,,, | Performed by: INTERNAL MEDICINE

## 2020-12-17 PROCEDURE — 3008F PR BODY MASS INDEX (BMI) DOCUMENTED: ICD-10-PCS | Mod: S$GLB,,, | Performed by: INTERNAL MEDICINE

## 2020-12-17 PROCEDURE — 3074F PR MOST RECENT SYSTOLIC BLOOD PRESSURE < 130 MM HG: ICD-10-PCS | Mod: S$GLB,,, | Performed by: INTERNAL MEDICINE

## 2020-12-17 PROCEDURE — 1125F PR PAIN SEVERITY QUANTIFIED, PAIN PRESENT: ICD-10-PCS | Mod: S$GLB,,, | Performed by: INTERNAL MEDICINE

## 2020-12-17 PROCEDURE — 3074F SYST BP LT 130 MM HG: CPT | Mod: S$GLB,,, | Performed by: INTERNAL MEDICINE

## 2020-12-17 NOTE — PROGRESS NOTES
[facility] RHEUMATOLOGY           Follow-up visit      Subjective:       Patient ID:   NAME: Martita San : 1965     51 y.o. female    Referring Doc: No ref. provider found  Other Physicians:    Chief Complaint:  Fibromyalgia and Pain (6/10 generalized)      HPI/Interval History:      He patient has been involved over the past 4 months in getting a diagnosis for an unusual skin rash and some hematologic abnormalities that apparently are related to hemochromatosis. She has been treated by Dr. Rocha with iron infusions and has responded well. The skin rash has been evaluated several times by a nurse practitioner in Dr. Major's office and was given some topical cream but no biopsy was performed.          ROS:   GEN: no fever, night sweats or weight loss  SKIN:  Rashes as above, no erythema, bruising, or swelling, no Raynauds, no photosensitivity  HEENT: no HAs, no changes in vision, no mouth ulcers, no sicca symptoms, no scalp tenderness, jaw claudication.  CV: no CP, SOB, PND, ANTON or orthopnea,no palpitations  PULM: no SOB, cough, hemoptysis, sputum or pleuritic pain  GI:  + nausea, vomiting, constipation, diarrhea, melanotic stools, BRBPR, or hematemesis.no dysphagia  : no hematuria, dysuria  NEURO:no paresthesias, headaches, visual disturbances, muscle weakness  MUSCULOSKELETAL:  Joint pain without swelling, diffuse myalgias, constant pain in left shoulder  PSYCH: ++ insomnia, unchanged depression & anxiety    Medications:    Current Outpatient Prescriptions:     amlodipine (NORVASC) 5 MG tablet, Take 5 mg by mouth once daily., Disp: , Rfl:     apixaban (ELIQUIS) 5 mg Tab, Take 5 mg by mouth 2 (two) times daily., Disp: , Rfl:     aspirin (ECOTRIN) 325 MG EC tablet, 325 mg., Disp: , Rfl:     cloNIDine (CATAPRES) 0.1 MG tablet, Take 0.1 mg by mouth 3 (three) times daily., Disp: , Rfl:     diclofenac sodium 1 % Gel, 2 g., Disp: , Rfl:     escitalopram oxalate (LEXAPRO) 20 MG tablet, Take 20 mg by    REFERRING PHYSICIAN: Naseem Hartman MD    DATE:  12/16/2020    PREOPERATIVE DIAGNOSIS:  Subcutaneous mass of the left hip.    POSTOPERATIVE DIAGNOSIS:  Lipomatous mass of left hip.    PROCEDURE:  Excision of 6 cm lipoma of left hip.    ASSISTANT:  Dr. Pierre.    ANESTHESIA:  General.    HISTORY:  This is a 57-year-old woman, who has had subcutaneous mass of left hip, which had been increasing in size.  Clinically, it appeared to be a lipoma, and subsequently the intraoperative findings were consistent with a multi-lobulated lipoma.    DESCRIPTION OF PROCEDURE:  The patient was sterilely prepped and draped.  Local anesthetic was infiltrated over and around the palpable mass.  An incision was performed.  Dissection carried down through the dermis to the underlying subcutaneous tissues, where a fairly well demarcated multi-lobulated lipomatous mass was encountered and circumferentially dissected free.  It was sent for pathologic examination.  All abnormal tissue was removed.  The wound was irrigated and hemostasis achieved with cautery, and closure achieved using interrupted 3-0 chromic sutures to re-approximate the subcutaneous tissues and a running 4-0 Vicryl     subcuticular suture to approximate the skin.  Steri-Strips and appropriate dressing were applied.  The patient tolerated the procedure well, and was discharged in stable condition with minimal blood loss.        Dictated By:  Naseem Hartman MD        D:  12/16/2020 14:23:34  T:  12/16/2020 21:09:11  STEVE/daphne  Job:  [id number]      cc:  Naseem Hartman MD   "mouth once daily., Disp: , Rfl:     ferrous sulfate 325 mg (65 mg iron) Tab tablet, Take 325 mg by mouth daily with breakfast., Disp: , Rfl:     hydrOXYzine pamoate (VISTARIL) 25 MG Cap, Take 25 mg by mouth 4 (four) times daily., Disp: , Rfl:     lisinopril (PRINIVIL,ZESTRIL) 30 MG tablet, Take 1.5 tablets (45 mg total) by mouth once daily., Disp: 30 tablet, Rfl: 0    metoprolol succinate (TOPROL-XL) 50 MG 24 hr tablet, Take 50 mg by mouth every evening. , Disp: , Rfl:     omeprazole (PRILOSEC) 10 MG capsule, Take 10 mg by mouth once daily., Disp: , Rfl:   FAMILY HISTORY: negative for Connective Tissue Disease        Review of patient's allergies indicates:   Allergen Reactions    Pcn [penicillins] Anaphylaxis    Benadryl allergy decongestant     Clindamycin Hives    Influenza virus vaccines     Latex, natural rubber Hives    Oxycodone              Objective:     Vitals:  Blood pressure (!) 148/88, height 5' 8" (1.727 m), weight 109.2 kg (240 lb 12.8 oz).    Physical Examination:   GEN: wn/wd female in no apparent distress; AAOx3  SKIN: a single 2 cm round, raised, erythematous, non-blanching lesion is noted in the posterior aspect of the left lower leg. It is apparently pruritic as it is excoriated, no sclerodactyly, no Raynaud's, no periungual erythema  HEAD: no alopecia, no scalp tenderness, no temporal artery tenderness or induration.  EYES: no pallor, no icterus, PERRLA  ENT:  no thrush, no mucosal dryness or ulcerations, adequate oral hygiene & dentition.  NECK: supple x 6, no masses, no thyromegaly, no lymphadenopathy.  CV:   S1 and S2 regular, no murmurs, gallop or rubs  CHEST: Normal respiratory effort;  normal breath sounds/no adventitious sounds. No signs of consolidation.  ABD: non-tender and non-distended; soft; normal bowel sounds; no rebound/guarding or tenderness. No hepatosplenomegaly.  Musculoskeletal:  Tenderness in the left subacromial region without warmth, swelling or redness. Active " range of motion 110, passive range of motion 140. The drop arm sign and impingement signs are negative.  EXTREM: no clubbing, cyanosis or edema. normal pulses.  NEURO: grossly intact; motor/sensory WNL; AAOx3; no tremors  PSYCH: normal mood, affect and behavior            Labs:   Lab Results   Component Value Date    WBC 13.1 (H) 09/19/2017    HGB 14.0 09/19/2017    HCT 42.3 09/19/2017    MCV 73 (L) 12/28/2016     09/19/2017   CMP@  Sodium   Date Value Ref Range Status   09/19/2017 142 134 - 144 mmol/L      Potassium   Date Value Ref Range Status   09/19/2017 4.0 3.5 - 5.0 mmol/L      Chloride   Date Value Ref Range Status   09/19/2017 106 98 - 110 mmol/L      CO2   Date Value Ref Range Status   09/19/2017 26.0 22.8 - 31.6 mmol/L      Glucose   Date Value Ref Range Status   09/19/2017 133 (H) 70 - 99 mg/dL      BUN, Bld   Date Value Ref Range Status   09/19/2017 18 8 - 20 mg/dL      Creatinine   Date Value Ref Range Status   09/19/2017 0.96 0.60 - 1.40 mg/dL      Calcium   Date Value Ref Range Status   09/19/2017 9.5 7.7 - 10.4 mg/dL      Total Protein   Date Value Ref Range Status   09/19/2017 7.3 6.0 - 8.2 g/dL      Albumin   Date Value Ref Range Status   09/19/2017 4.2 3.1 - 4.7 g/dL      Total Bilirubin   Date Value Ref Range Status   09/19/2017 0.5 0.3 - 1.0 mg/dL      Alkaline Phosphatase   Date Value Ref Range Status   09/19/2017 64 40 - 104 IU/L      AST   Date Value Ref Range Status   09/19/2017 30 10 - 40 IU/L      ALT   Date Value Ref Range Status   12/26/2016 15 10 - 44 U/L Final         Radiology/Diagnostic Studies:    No x-ray studies are available    Assessment/Discussion/Plan:   51 y.o. female with fibromyalgia-moderately active, off both Xanax and Restoril.  (2) Left subacromial bursitis  (3) iron-related blood dyscrasia    PLAN:  I will have injected the left subacromial bursa as noted in the appendage procedure note. Full range of motion was restored immediately afterward.  I am going to  renew the Restoril prescription though I will not at this time renew Xanax.  The patient has a 1st appointment scheduled with Dr. Lucrecia Monae.       RTC:   Return to clinic 3 months      Electronically signed by Reji Coleman MD

## 2021-01-23 ENCOUNTER — HOSPITAL ENCOUNTER (EMERGENCY)
Facility: HOSPITAL | Age: 56
Discharge: HOME | End: 2021-01-23
Attending: EMERGENCY MEDICINE
Payer: MEDICARE

## 2021-01-23 VITALS
RESPIRATION RATE: 18 BRPM | HEIGHT: 67 IN | SYSTOLIC BLOOD PRESSURE: 166 MMHG | HEART RATE: 94 BPM | TEMPERATURE: 99 F | DIASTOLIC BLOOD PRESSURE: 101 MMHG | OXYGEN SATURATION: 98 % | BODY MASS INDEX: 31.71 KG/M2 | WEIGHT: 202 LBS

## 2021-01-23 DIAGNOSIS — M79.602 LEFT ARM PAIN: ICD-10-CM

## 2021-01-23 DIAGNOSIS — R52 PAIN: ICD-10-CM

## 2021-01-23 DIAGNOSIS — S16.1XXA STRAIN OF NECK MUSCLE, INITIAL ENCOUNTER: Primary | ICD-10-CM

## 2021-01-23 PROCEDURE — 99284 EMERGENCY DEPT VISIT MOD MDM: CPT | Mod: 25

## 2021-01-23 RX ORDER — NAPROXEN 500 MG/1
500 TABLET ORAL 2 TIMES DAILY WITH MEALS
Qty: 30 TABLET | Refills: 0 | Status: SHIPPED | OUTPATIENT
Start: 2021-01-23

## 2021-01-23 RX ORDER — METHOCARBAMOL 500 MG/1
1000 TABLET, FILM COATED ORAL 3 TIMES DAILY
Qty: 30 TABLET | Refills: 0 | Status: SHIPPED | OUTPATIENT
Start: 2021-01-23 | End: 2021-01-28

## 2021-02-02 ENCOUNTER — OFFICE VISIT (OUTPATIENT)
Dept: ORTHOPEDICS | Facility: CLINIC | Age: 56
End: 2021-02-02
Payer: MEDICARE

## 2021-02-02 VITALS
SYSTOLIC BLOOD PRESSURE: 138 MMHG | DIASTOLIC BLOOD PRESSURE: 74 MMHG | WEIGHT: 202 LBS | HEIGHT: 67 IN | BODY MASS INDEX: 31.71 KG/M2 | HEART RATE: 83 BPM

## 2021-02-02 DIAGNOSIS — M75.42 IMPINGEMENT SYNDROME OF LEFT SHOULDER: Primary | ICD-10-CM

## 2021-02-02 PROCEDURE — 3075F PR MOST RECENT SYSTOLIC BLOOD PRESS GE 130-139MM HG: ICD-10-PCS | Mod: S$GLB,,, | Performed by: ORTHOPAEDIC SURGERY

## 2021-02-02 PROCEDURE — 3078F PR MOST RECENT DIASTOLIC BLOOD PRESSURE < 80 MM HG: ICD-10-PCS | Mod: S$GLB,,, | Performed by: ORTHOPAEDIC SURGERY

## 2021-02-02 PROCEDURE — 99203 PR OFFICE/OUTPT VISIT, NEW, LEVL III, 30-44 MIN: ICD-10-PCS | Mod: 25,S$GLB,, | Performed by: ORTHOPAEDIC SURGERY

## 2021-02-02 PROCEDURE — 20610 LARGE JOINT ASPIRATION/INJECTION: L SUBACROMIAL BURSA: ICD-10-PCS | Mod: LT,S$GLB,, | Performed by: ORTHOPAEDIC SURGERY

## 2021-02-02 PROCEDURE — 3075F SYST BP GE 130 - 139MM HG: CPT | Mod: S$GLB,,, | Performed by: ORTHOPAEDIC SURGERY

## 2021-02-02 PROCEDURE — 20610 DRAIN/INJ JOINT/BURSA W/O US: CPT | Mod: LT,S$GLB,, | Performed by: ORTHOPAEDIC SURGERY

## 2021-02-02 PROCEDURE — 99203 OFFICE O/P NEW LOW 30 MIN: CPT | Mod: 25,S$GLB,, | Performed by: ORTHOPAEDIC SURGERY

## 2021-02-02 PROCEDURE — 3008F PR BODY MASS INDEX (BMI) DOCUMENTED: ICD-10-PCS | Mod: S$GLB,,, | Performed by: ORTHOPAEDIC SURGERY

## 2021-02-02 PROCEDURE — 1125F AMNT PAIN NOTED PAIN PRSNT: CPT | Mod: S$GLB,,, | Performed by: ORTHOPAEDIC SURGERY

## 2021-02-02 PROCEDURE — 3008F BODY MASS INDEX DOCD: CPT | Mod: S$GLB,,, | Performed by: ORTHOPAEDIC SURGERY

## 2021-02-02 PROCEDURE — 3078F DIAST BP <80 MM HG: CPT | Mod: S$GLB,,, | Performed by: ORTHOPAEDIC SURGERY

## 2021-02-02 PROCEDURE — 1125F PR PAIN SEVERITY QUANTIFIED, PAIN PRESENT: ICD-10-PCS | Mod: S$GLB,,, | Performed by: ORTHOPAEDIC SURGERY

## 2021-02-02 RX ORDER — METHYLPREDNISOLONE ACETATE 40 MG/ML
40 INJECTION, SUSPENSION INTRA-ARTICULAR; INTRALESIONAL; INTRAMUSCULAR; SOFT TISSUE
Status: DISCONTINUED | OUTPATIENT
Start: 2021-02-02 | End: 2021-02-02 | Stop reason: HOSPADM

## 2021-02-02 RX ADMIN — METHYLPREDNISOLONE ACETATE 40 MG: 40 INJECTION, SUSPENSION INTRA-ARTICULAR; INTRALESIONAL; INTRAMUSCULAR; SOFT TISSUE at 08:02

## 2021-02-22 ENCOUNTER — OFFICE VISIT (OUTPATIENT)
Dept: ORTHOPEDICS | Facility: CLINIC | Age: 56
End: 2021-02-22
Payer: MEDICARE

## 2021-02-22 VITALS
DIASTOLIC BLOOD PRESSURE: 80 MMHG | SYSTOLIC BLOOD PRESSURE: 118 MMHG | WEIGHT: 202 LBS | HEIGHT: 67 IN | BODY MASS INDEX: 31.71 KG/M2 | HEART RATE: 74 BPM

## 2021-02-22 DIAGNOSIS — M75.42 IMPINGEMENT SYNDROME OF LEFT SHOULDER: ICD-10-CM

## 2021-02-22 DIAGNOSIS — S46.012A TRAUMATIC TEAR OF LEFT ROTATOR CUFF, UNSPECIFIED TEAR EXTENT, INITIAL ENCOUNTER: Primary | ICD-10-CM

## 2021-02-22 DIAGNOSIS — M25.512 ACUTE PAIN OF LEFT SHOULDER: ICD-10-CM

## 2021-02-22 DIAGNOSIS — M54.12 LEFT CERVICAL RADICULOPATHY: ICD-10-CM

## 2021-02-22 DIAGNOSIS — S16.1XXD CERVICAL STRAIN, SUBSEQUENT ENCOUNTER: ICD-10-CM

## 2021-02-22 PROCEDURE — 3008F PR BODY MASS INDEX (BMI) DOCUMENTED: ICD-10-PCS | Mod: S$GLB,,, | Performed by: PHYSICIAN ASSISTANT

## 2021-02-22 PROCEDURE — 3079F PR MOST RECENT DIASTOLIC BLOOD PRESSURE 80-89 MM HG: ICD-10-PCS | Mod: S$GLB,,, | Performed by: PHYSICIAN ASSISTANT

## 2021-02-22 PROCEDURE — 1125F AMNT PAIN NOTED PAIN PRSNT: CPT | Mod: S$GLB,,, | Performed by: PHYSICIAN ASSISTANT

## 2021-02-22 PROCEDURE — 3074F PR MOST RECENT SYSTOLIC BLOOD PRESSURE < 130 MM HG: ICD-10-PCS | Mod: S$GLB,,, | Performed by: PHYSICIAN ASSISTANT

## 2021-02-22 PROCEDURE — 99213 PR OFFICE/OUTPT VISIT, EST, LEVL III, 20-29 MIN: ICD-10-PCS | Mod: S$GLB,,, | Performed by: PHYSICIAN ASSISTANT

## 2021-02-22 PROCEDURE — 99213 OFFICE O/P EST LOW 20 MIN: CPT | Mod: S$GLB,,, | Performed by: PHYSICIAN ASSISTANT

## 2021-02-22 PROCEDURE — 3079F DIAST BP 80-89 MM HG: CPT | Mod: S$GLB,,, | Performed by: PHYSICIAN ASSISTANT

## 2021-02-22 PROCEDURE — 3074F SYST BP LT 130 MM HG: CPT | Mod: S$GLB,,, | Performed by: PHYSICIAN ASSISTANT

## 2021-02-22 PROCEDURE — 3008F BODY MASS INDEX DOCD: CPT | Mod: S$GLB,,, | Performed by: PHYSICIAN ASSISTANT

## 2021-02-22 PROCEDURE — 1125F PR PAIN SEVERITY QUANTIFIED, PAIN PRESENT: ICD-10-PCS | Mod: S$GLB,,, | Performed by: PHYSICIAN ASSISTANT

## 2021-03-09 ENCOUNTER — HOSPITAL ENCOUNTER (OUTPATIENT)
Dept: RADIOLOGY | Facility: HOSPITAL | Age: 56
Discharge: HOME | End: 2021-03-09
Attending: PHYSICIAN ASSISTANT
Payer: MEDICARE

## 2021-03-09 DIAGNOSIS — M25.512 ACUTE PAIN OF LEFT SHOULDER: ICD-10-CM

## 2021-03-09 DIAGNOSIS — S46.012A TRAUMATIC TEAR OF LEFT ROTATOR CUFF, UNSPECIFIED TEAR EXTENT, INITIAL ENCOUNTER: ICD-10-CM

## 2021-03-09 DIAGNOSIS — M75.42 IMPINGEMENT SYNDROME OF LEFT SHOULDER: ICD-10-CM

## 2021-03-09 PROCEDURE — 73221 MRI JOINT UPR EXTREM W/O DYE: CPT | Mod: TC,PO,LT

## 2021-05-15 ENCOUNTER — HOSPITAL ENCOUNTER (EMERGENCY)
Facility: HOSPITAL | Age: 56
Discharge: HOME | End: 2021-05-15
Attending: EMERGENCY MEDICINE
Payer: MEDICARE

## 2021-05-15 VITALS
OXYGEN SATURATION: 99 % | DIASTOLIC BLOOD PRESSURE: 93 MMHG | SYSTOLIC BLOOD PRESSURE: 154 MMHG | TEMPERATURE: 98 F | RESPIRATION RATE: 18 BRPM | HEART RATE: 61 BPM

## 2021-05-15 DIAGNOSIS — R05.9 COUGH: ICD-10-CM

## 2021-05-15 DIAGNOSIS — J20.9 ACUTE BRONCHITIS, UNSPECIFIED ORGANISM: Primary | ICD-10-CM

## 2021-05-15 PROCEDURE — 99284 EMERGENCY DEPT VISIT MOD MDM: CPT | Mod: 25

## 2021-05-15 RX ORDER — AZITHROMYCIN 250 MG/1
TABLET, FILM COATED ORAL
Qty: 6 TABLET | Refills: 0 | Status: SHIPPED | OUTPATIENT
Start: 2021-05-15 | End: 2021-05-20

## 2021-05-15 RX ORDER — PREDNISONE 20 MG/1
40 TABLET ORAL DAILY
Qty: 6 TABLET | Refills: 0 | Status: SHIPPED | OUTPATIENT
Start: 2021-05-15 | End: 2021-05-18

## 2021-05-15 RX ORDER — IPRATROPIUM BROMIDE AND ALBUTEROL SULFATE 2.5; .5 MG/3ML; MG/3ML
3 SOLUTION RESPIRATORY (INHALATION) EVERY 6 HOURS PRN
Qty: 1 BOX | Refills: 0 | Status: SHIPPED | OUTPATIENT
Start: 2021-05-15 | End: 2023-06-06 | Stop reason: SDUPTHER

## 2021-05-15 RX ORDER — ALBUTEROL SULFATE 90 UG/1
1-2 AEROSOL, METERED RESPIRATORY (INHALATION) EVERY 6 HOURS PRN
Qty: 6.7 G | Refills: 0 | Status: SHIPPED | OUTPATIENT
Start: 2021-05-15 | End: 2022-05-15

## 2021-07-27 RX ORDER — FUROSEMIDE 20 MG/1
20 TABLET ORAL DAILY
Qty: 90 TABLET | Refills: 3 | OUTPATIENT
Start: 2021-07-27

## 2021-07-28 ENCOUNTER — LAB VISIT (OUTPATIENT)
Dept: LAB | Facility: HOSPITAL | Age: 56
End: 2021-07-28
Attending: INTERNAL MEDICINE
Payer: MEDICAID

## 2021-07-28 DIAGNOSIS — D50.0 IRON DEFICIENCY ANEMIA DUE TO CHRONIC BLOOD LOSS: ICD-10-CM

## 2021-07-28 LAB
ALBUMIN SERPL BCP-MCNC: 3.9 G/DL (ref 3.5–5.2)
ALP SERPL-CCNC: 58 U/L (ref 55–135)
ALT SERPL W/O P-5'-P-CCNC: 13 U/L (ref 10–44)
ANION GAP SERPL CALC-SCNC: 11 MMOL/L (ref 8–16)
AST SERPL-CCNC: 13 U/L (ref 10–40)
BASOPHILS # BLD AUTO: 0.09 K/UL (ref 0–0.2)
BASOPHILS NFR BLD: 1.4 % (ref 0–1.9)
BILIRUB SERPL-MCNC: 0.7 MG/DL (ref 0.1–1)
BUN SERPL-MCNC: 16 MG/DL (ref 6–20)
CALCIUM SERPL-MCNC: 9.1 MG/DL (ref 8.7–10.5)
CHLORIDE SERPL-SCNC: 104 MMOL/L (ref 95–110)
CO2 SERPL-SCNC: 27 MMOL/L (ref 23–29)
CREAT SERPL-MCNC: 0.9 MG/DL (ref 0.5–1.4)
DIFFERENTIAL METHOD: ABNORMAL
EOSINOPHIL # BLD AUTO: 0.9 K/UL (ref 0–0.5)
EOSINOPHIL NFR BLD: 14.4 % (ref 0–8)
ERYTHROCYTE [DISTWIDTH] IN BLOOD BY AUTOMATED COUNT: 12.9 % (ref 11.5–14.5)
EST. GFR  (AFRICAN AMERICAN): >60 ML/MIN/1.73 M^2
EST. GFR  (NON AFRICAN AMERICAN): >60 ML/MIN/1.73 M^2
FERRITIN SERPL-MCNC: 43 NG/ML (ref 20–300)
GLUCOSE SERPL-MCNC: 101 MG/DL (ref 70–110)
HCT VFR BLD AUTO: 42.1 % (ref 37–48.5)
HGB BLD-MCNC: 13.8 G/DL (ref 12–16)
IMM GRANULOCYTES # BLD AUTO: 0.01 K/UL (ref 0–0.04)
IMM GRANULOCYTES NFR BLD AUTO: 0.2 % (ref 0–0.5)
IRON SERPL-MCNC: 40 UG/DL (ref 30–160)
LYMPHOCYTES # BLD AUTO: 1.7 K/UL (ref 1–4.8)
LYMPHOCYTES NFR BLD: 27.7 % (ref 18–48)
MCH RBC QN AUTO: 30.2 PG (ref 27–31)
MCHC RBC AUTO-ENTMCNC: 32.8 G/DL (ref 32–36)
MCV RBC AUTO: 92 FL (ref 82–98)
MONOCYTES # BLD AUTO: 0.6 K/UL (ref 0.3–1)
MONOCYTES NFR BLD: 9.3 % (ref 4–15)
NEUTROPHILS # BLD AUTO: 2.9 K/UL (ref 1.8–7.7)
NEUTROPHILS NFR BLD: 47 % (ref 38–73)
NRBC BLD-RTO: 0 /100 WBC
PLATELET # BLD AUTO: 167 K/UL (ref 150–450)
PMV BLD AUTO: 10.1 FL (ref 9.2–12.9)
POTASSIUM SERPL-SCNC: 4.1 MMOL/L (ref 3.5–5.1)
PROT SERPL-MCNC: 6.9 G/DL (ref 6–8.4)
RBC # BLD AUTO: 4.57 M/UL (ref 4–5.4)
SATURATED IRON: 14 % (ref 20–50)
SODIUM SERPL-SCNC: 142 MMOL/L (ref 136–145)
TOTAL IRON BINDING CAPACITY: 283 UG/DL (ref 250–450)
TRANSFERRIN SERPL-MCNC: 202 MG/DL (ref 200–375)
WBC # BLD AUTO: 6.24 K/UL (ref 3.9–12.7)

## 2021-07-28 PROCEDURE — 36415 COLL VENOUS BLD VENIPUNCTURE: CPT | Performed by: INTERNAL MEDICINE

## 2021-07-28 PROCEDURE — 82728 ASSAY OF FERRITIN: CPT | Performed by: INTERNAL MEDICINE

## 2021-07-28 PROCEDURE — 85025 COMPLETE CBC W/AUTO DIFF WBC: CPT | Performed by: INTERNAL MEDICINE

## 2021-07-28 PROCEDURE — 83540 ASSAY OF IRON: CPT | Performed by: INTERNAL MEDICINE

## 2021-07-28 PROCEDURE — 80053 COMPREHEN METABOLIC PANEL: CPT | Performed by: INTERNAL MEDICINE

## 2021-08-04 ENCOUNTER — OFFICE VISIT (OUTPATIENT)
Dept: CARDIOLOGY | Facility: CLINIC | Age: 56
End: 2021-08-04
Payer: MEDICARE

## 2021-08-04 VITALS
RESPIRATION RATE: 16 BRPM | HEART RATE: 74 BPM | WEIGHT: 204.81 LBS | DIASTOLIC BLOOD PRESSURE: 90 MMHG | SYSTOLIC BLOOD PRESSURE: 121 MMHG | OXYGEN SATURATION: 98 % | BODY MASS INDEX: 32.15 KG/M2 | HEIGHT: 67 IN

## 2021-08-04 DIAGNOSIS — I48.0 PAROXYSMAL ATRIAL FIBRILLATION: Primary | ICD-10-CM

## 2021-08-04 DIAGNOSIS — I67.89 OTHER CEREBROVASCULAR DISEASE: ICD-10-CM

## 2021-08-04 DIAGNOSIS — E78.5 DYSLIPIDEMIA, GOAL LDL BELOW 100: ICD-10-CM

## 2021-08-04 DIAGNOSIS — I10 HTN, GOAL BELOW 130/80: ICD-10-CM

## 2021-08-04 DIAGNOSIS — R21 RASH: ICD-10-CM

## 2021-08-04 DIAGNOSIS — D64.9 ANEMIA, UNSPECIFIED TYPE: ICD-10-CM

## 2021-08-04 DIAGNOSIS — I73.9 PAD (PERIPHERAL ARTERY DISEASE): ICD-10-CM

## 2021-08-04 DIAGNOSIS — T50.905D ADVERSE EFFECT OF DRUG, SUBSEQUENT ENCOUNTER: ICD-10-CM

## 2021-08-04 PROCEDURE — 3008F BODY MASS INDEX DOCD: CPT | Mod: CPTII,S$GLB,, | Performed by: GENERAL PRACTICE

## 2021-08-04 PROCEDURE — 1160F PR REVIEW ALL MEDS BY PRESCRIBER/CLIN PHARMACIST DOCUMENTED: ICD-10-PCS | Mod: CPTII,S$GLB,, | Performed by: GENERAL PRACTICE

## 2021-08-04 PROCEDURE — 1126F PR PAIN SEVERITY QUANTIFIED, NO PAIN PRESENT: ICD-10-PCS | Mod: CPTII,S$GLB,, | Performed by: GENERAL PRACTICE

## 2021-08-04 PROCEDURE — 99212 PR OFFICE/OUTPT VISIT, EST, LEVL II, 10-19 MIN: ICD-10-PCS | Mod: S$GLB,,, | Performed by: GENERAL PRACTICE

## 2021-08-04 PROCEDURE — 3008F PR BODY MASS INDEX (BMI) DOCUMENTED: ICD-10-PCS | Mod: CPTII,S$GLB,, | Performed by: GENERAL PRACTICE

## 2021-08-04 PROCEDURE — 3080F PR MOST RECENT DIASTOLIC BLOOD PRESSURE >= 90 MM HG: ICD-10-PCS | Mod: CPTII,S$GLB,, | Performed by: GENERAL PRACTICE

## 2021-08-04 PROCEDURE — 3074F SYST BP LT 130 MM HG: CPT | Mod: CPTII,S$GLB,, | Performed by: GENERAL PRACTICE

## 2021-08-04 PROCEDURE — 1160F RVW MEDS BY RX/DR IN RCRD: CPT | Mod: CPTII,S$GLB,, | Performed by: GENERAL PRACTICE

## 2021-08-04 PROCEDURE — 1159F MED LIST DOCD IN RCRD: CPT | Mod: CPTII,S$GLB,, | Performed by: GENERAL PRACTICE

## 2021-08-04 PROCEDURE — 1159F PR MEDICATION LIST DOCUMENTED IN MEDICAL RECORD: ICD-10-PCS | Mod: CPTII,S$GLB,, | Performed by: GENERAL PRACTICE

## 2021-08-04 PROCEDURE — 99212 OFFICE O/P EST SF 10 MIN: CPT | Mod: S$GLB,,, | Performed by: GENERAL PRACTICE

## 2021-08-04 PROCEDURE — 3080F DIAST BP >= 90 MM HG: CPT | Mod: CPTII,S$GLB,, | Performed by: GENERAL PRACTICE

## 2021-08-04 PROCEDURE — 3074F PR MOST RECENT SYSTOLIC BLOOD PRESSURE < 130 MM HG: ICD-10-PCS | Mod: CPTII,S$GLB,, | Performed by: GENERAL PRACTICE

## 2021-08-04 PROCEDURE — 1126F AMNT PAIN NOTED NONE PRSNT: CPT | Mod: CPTII,S$GLB,, | Performed by: GENERAL PRACTICE

## 2021-08-04 RX ORDER — TRIAMCINOLONE ACETONIDE 1 MG/G
CREAM TOPICAL
COMMUNITY
Start: 2021-07-30

## 2021-08-06 RX ORDER — AMLODIPINE BESYLATE 10 MG/1
10 TABLET ORAL DAILY
Qty: 90 TABLET | Refills: 3 | Status: SHIPPED | OUTPATIENT
Start: 2021-08-06 | End: 2021-10-04 | Stop reason: SDUPTHER

## 2021-08-06 RX ORDER — METOPROLOL SUCCINATE 100 MG/1
100 TABLET, EXTENDED RELEASE ORAL DAILY
Qty: 90 TABLET | Refills: 3 | Status: SHIPPED | OUTPATIENT
Start: 2021-08-06 | End: 2021-10-04 | Stop reason: SDUPTHER

## 2021-08-11 ENCOUNTER — HOSPITAL ENCOUNTER (EMERGENCY)
Facility: HOSPITAL | Age: 56
Discharge: HOME | End: 2021-08-11
Attending: EMERGENCY MEDICINE
Payer: MEDICARE

## 2021-08-11 VITALS
HEIGHT: 67 IN | SYSTOLIC BLOOD PRESSURE: 151 MMHG | WEIGHT: 204 LBS | HEART RATE: 82 BPM | RESPIRATION RATE: 18 BRPM | OXYGEN SATURATION: 97 % | TEMPERATURE: 99 F | DIASTOLIC BLOOD PRESSURE: 108 MMHG | BODY MASS INDEX: 32.02 KG/M2

## 2021-08-11 DIAGNOSIS — T63.461A WASP STING, ACCIDENTAL OR UNINTENTIONAL, INITIAL ENCOUNTER: Primary | ICD-10-CM

## 2021-08-11 DIAGNOSIS — T63.481A LOCAL REACTION TO INSECT STING, ACCIDENTAL OR UNINTENTIONAL, INITIAL ENCOUNTER: ICD-10-CM

## 2021-08-11 PROCEDURE — 99283 EMERGENCY DEPT VISIT LOW MDM: CPT

## 2021-08-11 PROCEDURE — 63600175 PHARM REV CODE 636 W HCPCS: Performed by: EMERGENCY MEDICINE

## 2021-08-11 PROCEDURE — 25000003 PHARM REV CODE 250: Performed by: EMERGENCY MEDICINE

## 2021-08-11 RX ORDER — ACETAMINOPHEN 500 MG
1000 TABLET ORAL
Status: COMPLETED | OUTPATIENT
Start: 2021-08-11 | End: 2021-08-11

## 2021-08-11 RX ORDER — PREDNISONE 20 MG/1
60 TABLET ORAL
Status: COMPLETED | OUTPATIENT
Start: 2021-08-11 | End: 2021-08-11

## 2021-08-11 RX ORDER — PREDNISONE 20 MG/1
20 TABLET ORAL DAILY
Qty: 3 TABLET | Refills: 0 | Status: SHIPPED | OUTPATIENT
Start: 2021-08-11 | End: 2021-08-14

## 2021-08-11 RX ADMIN — PREDNISONE 60 MG: 20 TABLET ORAL at 06:08

## 2021-08-11 RX ADMIN — ACETAMINOPHEN 1000 MG: 500 TABLET, FILM COATED ORAL at 06:08

## 2021-08-25 ENCOUNTER — HOSPITAL ENCOUNTER (OUTPATIENT)
Dept: CARDIOLOGY | Facility: CLINIC | Age: 56
Discharge: HOME | End: 2021-08-25
Attending: GENERAL PRACTICE
Payer: MEDICARE

## 2021-08-25 VITALS
WEIGHT: 203.94 LBS | HEIGHT: 67 IN | HEIGHT: 67 IN | WEIGHT: 203.94 LBS | BODY MASS INDEX: 32.01 KG/M2 | BODY MASS INDEX: 32.01 KG/M2

## 2021-08-25 DIAGNOSIS — I67.89 OTHER CEREBROVASCULAR DISEASE: ICD-10-CM

## 2021-08-25 DIAGNOSIS — I10 HTN, GOAL BELOW 130/80: ICD-10-CM

## 2021-08-25 DIAGNOSIS — I73.9 PAD (PERIPHERAL ARTERY DISEASE): ICD-10-CM

## 2021-08-25 DIAGNOSIS — I48.0 PAROXYSMAL ATRIAL FIBRILLATION: ICD-10-CM

## 2021-08-25 LAB
AORTIC VALVE CUSP SEPERATION: 2.2 CM
AV INDEX (PROSTH): 1.06
AV MEAN GRADIENT: 14 MMHG
AV PEAK GRADIENT: 14 MMHG
AV VALVE AREA: 4.4 CM2
AV VELOCITY RATIO: 0.52
BSA FOR ECHO PROCEDURE: 2.09 M2
CV ECHO LV RWT: 0.92 CM
DOP CALC AO PEAK VEL: 1.84 M/S
DOP CALC AO VTI: 33.7 CM
DOP CALC LVOT AREA: 4.2 CM2
DOP CALC LVOT DIAMETER: 2.3 CM
DOP CALC LVOT PEAK VEL: 0.96 M/S
DOP CALC LVOT STROKE VOLUME: 148.25 CM3
DOP CALCLVOT PEAK VEL VTI: 35.7 CM
E WAVE DECELERATION TIME: 253 MS
ECHO LV POSTERIOR WALL: 2.03 CM (ref 0.6–1.1)
EJECTION FRACTION: 70 %
FRACTIONAL SHORTENING: 36 % (ref 28–44)
INTERVENTRICULAR SEPTUM: 1.45 CM (ref 0.6–1.1)
IVRT: 69 MS
LEFT ATRIUM SIZE: 4.7 CM
LEFT INTERNAL DIMENSION IN SYSTOLE: 2.84 CM (ref 2.1–4)
LEFT VENTRICLE MASS INDEX: 166 G/M2
LEFT VENTRICULAR INTERNAL DIMENSION IN DIASTOLE: 4.41 CM (ref 3.5–6)
LEFT VENTRICULAR MASS: 337.9 G
MV PEAK A VEL: 0.87 M/S
MV STENOSIS PRESSURE HALF TIME: 73 MS
MV VALVE AREA P 1/2 METHOD: 3.01 CM2
PISA TR MAX VEL: 2.36 M/S
PV PEAK VELOCITY: 0.96 M/S
RIGHT VENTRICULAR END-DIASTOLIC DIMENSION: 2.04 CM
TR MAX PG: 22 MMHG

## 2021-08-25 PROCEDURE — 93325 PR DOPPLER COLOR FLOW VELOCITY MAP: ICD-10-PCS | Mod: S$GLB,,, | Performed by: GENERAL PRACTICE

## 2021-08-25 PROCEDURE — 93880 EXTRACRANIAL BILAT STUDY: CPT | Mod: S$GLB,,, | Performed by: GENERAL PRACTICE

## 2021-08-25 PROCEDURE — 93321 PR DOPPLER ECHO HEART,LIMITED,F/U: ICD-10-PCS | Mod: S$GLB,,, | Performed by: GENERAL PRACTICE

## 2021-08-25 PROCEDURE — 93880 CV US DOPPLER CAROTID (CUPID ONLY): ICD-10-PCS | Mod: S$GLB,,, | Performed by: GENERAL PRACTICE

## 2021-08-25 PROCEDURE — 93308 ECHO (CUPID ONLY): ICD-10-PCS | Mod: S$GLB,,, | Performed by: GENERAL PRACTICE

## 2021-08-25 PROCEDURE — 93321 DOPPLER ECHO F-UP/LMTD STD: CPT | Mod: S$GLB,,, | Performed by: GENERAL PRACTICE

## 2021-08-25 PROCEDURE — 93308 TTE F-UP OR LMTD: CPT | Mod: S$GLB,,, | Performed by: GENERAL PRACTICE

## 2021-08-25 PROCEDURE — 93325 DOPPLER ECHO COLOR FLOW MAPG: CPT | Mod: S$GLB,,, | Performed by: GENERAL PRACTICE

## 2021-08-26 LAB
LEFT CCA DIST DIAS: 32 CM/S
LEFT CCA DIST SYS: 83 CM/S
LEFT CCA MID DIAS: 28 CM/S
LEFT CCA MID SYS: 86 CM/S
LEFT CCA PROX DIAS: 33 CM/S
LEFT CCA PROX SYS: 105 CM/S
LEFT ECA DIAS: 21 CM/S
LEFT ECA SYS: 84 CM/S
LEFT ICA DIST DIAS: 42 CM/S
LEFT ICA DIST SYS: 103 CM/S
LEFT ICA MID DIAS: 50 CM/S
LEFT ICA MID SYS: 97 CM/S
LEFT ICA PROX DIAS: 26 CM/S
LEFT ICA PROX SYS: 74 CM/S
LEFT VERTEBRAL DIAS: 18 CM/S
LEFT VERTEBRAL SYS: 47 CM/S
OHS CV CAROTID RIGHT ICA EDV HIGHEST: 56
OHS CV CAROTID ULTRASOUND LEFT ICA/CCA RATIO: 1.24
OHS CV CAROTID ULTRASOUND RIGHT ICA/CCA RATIO: 1.57
OHS CV PV CAROTID LEFT HIGHEST CCA: 105
OHS CV PV CAROTID LEFT HIGHEST ICA: 103
OHS CV PV CAROTID RIGHT HIGHEST CCA: 121
OHS CV PV CAROTID RIGHT HIGHEST ICA: 118
OHS CV US CAROTID LEFT HIGHEST EDV: 50
RIGHT CCA DIST DIAS: 27 CM/S
RIGHT CCA DIST SYS: 75 CM/S
RIGHT CCA MID DIAS: 28 CM/S
RIGHT CCA MID SYS: 97 CM/S
RIGHT CCA PROX DIAS: 38 CM/S
RIGHT CCA PROX SYS: 121 CM/S
RIGHT ECA DIAS: 24 CM/S
RIGHT ECA SYS: 96 CM/S
RIGHT ICA DIST DIAS: 56 CM/S
RIGHT ICA DIST SYS: 118 CM/S
RIGHT ICA MID DIAS: 41 CM/S
RIGHT ICA MID SYS: 93 CM/S
RIGHT ICA PROX DIAS: 25 CM/S
RIGHT ICA PROX SYS: 71 CM/S
RIGHT VERTEBRAL DIAS: 22 CM/S
RIGHT VERTEBRAL SYS: 45 CM/S

## 2021-10-04 RX ORDER — LISINOPRIL 40 MG/1
40 TABLET ORAL DAILY
Qty: 90 TABLET | Refills: 3 | Status: SHIPPED | OUTPATIENT
Start: 2021-10-04 | End: 2022-01-11 | Stop reason: SDUPTHER

## 2021-10-04 RX ORDER — METOPROLOL SUCCINATE 100 MG/1
100 TABLET, EXTENDED RELEASE ORAL DAILY
Qty: 90 TABLET | Refills: 3 | Status: SHIPPED | OUTPATIENT
Start: 2021-10-04 | End: 2022-01-11 | Stop reason: SDUPTHER

## 2021-10-04 RX ORDER — FUROSEMIDE 20 MG/1
20 TABLET ORAL DAILY
Qty: 90 TABLET | Refills: 3 | Status: SHIPPED | OUTPATIENT
Start: 2021-10-04 | End: 2022-01-11 | Stop reason: SDUPTHER

## 2021-10-04 RX ORDER — AMLODIPINE BESYLATE 10 MG/1
10 TABLET ORAL DAILY
Qty: 90 TABLET | Refills: 3 | Status: SHIPPED | OUTPATIENT
Start: 2021-10-04 | End: 2022-01-11 | Stop reason: SDUPTHER

## 2021-10-04 NOTE — TELEPHONE ENCOUNTER
----- Message from Josué Padilla sent at 10/4/2021 10:13 AM CDT -----  Contact: pt  Type: Needs Medical Advice    Who Called:pt  Best Call Back Number:[phone]    Additional Information: Requesting callback regarding wanting to get refills on Rxs seeing if it can go thru to [employer] if not send to [facility], call [employer] if able to authorize [phone]     apixaban (ELIQUIS) 5 mg Tab  furosemide (LASIX) 20 MG tablet  metoprolol succinate (TOPROL-XL) 100 MG 24 hr tablet  amLODIPine (NORVASC) 10 MG tablet  lisinopriL (PRINIVIL,ZESTRIL) 40 MG tablet      Please Advise-Thank you       [facility] [address] [place] [id number] [facility]  [address] [facility] [address]  Phone: [phone] Fax: [phone]

## 2022-01-11 RX ORDER — AMLODIPINE BESYLATE 10 MG/1
10 TABLET ORAL DAILY
Qty: 90 TABLET | Refills: 3 | Status: SHIPPED | OUTPATIENT
Start: 2022-01-11 | End: 2022-11-01

## 2022-01-11 RX ORDER — FUROSEMIDE 20 MG/1
20 TABLET ORAL DAILY
Qty: 90 TABLET | Refills: 3 | Status: SHIPPED | OUTPATIENT
Start: 2022-01-11 | End: 2022-11-01

## 2022-01-11 RX ORDER — METOPROLOL SUCCINATE 100 MG/1
100 TABLET, EXTENDED RELEASE ORAL DAILY
Qty: 90 TABLET | Refills: 3 | Status: SHIPPED | OUTPATIENT
Start: 2022-01-11 | End: 2023-02-10

## 2022-01-11 RX ORDER — LISINOPRIL 40 MG/1
40 TABLET ORAL DAILY
Qty: 90 TABLET | Refills: 3 | Status: SHIPPED | OUTPATIENT
Start: 2022-01-11 | End: 2022-11-01

## 2022-02-28 ENCOUNTER — TELEPHONE (OUTPATIENT)
Dept: HEMATOLOGY/ONCOLOGY | Facility: CLINIC | Age: 57
End: 2022-02-28
Payer: MEDICAID

## 2022-02-28 DIAGNOSIS — D50.0 IRON DEFICIENCY ANEMIA DUE TO CHRONIC BLOOD LOSS: Primary | ICD-10-CM

## 2022-02-28 NOTE — TELEPHONE ENCOUNTER
sonia scheduled RTC appt, lab orders placed to [facility] lab per patient request.

## 2022-03-22 ENCOUNTER — LAB VISIT (OUTPATIENT)
Dept: LAB | Facility: HOSPITAL | Age: 57
End: 2022-03-22
Attending: INTERNAL MEDICINE
Payer: MEDICARE

## 2022-03-22 DIAGNOSIS — D50.0 IRON DEFICIENCY ANEMIA DUE TO CHRONIC BLOOD LOSS: ICD-10-CM

## 2022-03-22 LAB
ALBUMIN SERPL BCP-MCNC: 4.3 G/DL (ref 3.5–5.2)
ALP SERPL-CCNC: 62 U/L (ref 55–135)
ALT SERPL W/O P-5'-P-CCNC: 14 U/L (ref 10–44)
ANION GAP SERPL CALC-SCNC: 14 MMOL/L (ref 8–16)
AST SERPL-CCNC: 15 U/L (ref 10–40)
BASOPHILS # BLD AUTO: 0.09 K/UL (ref 0–0.2)
BASOPHILS NFR BLD: 1.2 % (ref 0–1.9)
BILIRUB SERPL-MCNC: 0.7 MG/DL (ref 0.1–1)
BUN SERPL-MCNC: 18 MG/DL (ref 6–20)
CALCIUM SERPL-MCNC: 9.5 MG/DL (ref 8.7–10.5)
CHLORIDE SERPL-SCNC: 104 MMOL/L (ref 95–110)
CO2 SERPL-SCNC: 24 MMOL/L (ref 23–29)
CREAT SERPL-MCNC: 1.2 MG/DL (ref 0.5–1.4)
DIFFERENTIAL METHOD: ABNORMAL
EOSINOPHIL # BLD AUTO: 0.8 K/UL (ref 0–0.5)
EOSINOPHIL NFR BLD: 9.7 % (ref 0–8)
ERYTHROCYTE [DISTWIDTH] IN BLOOD BY AUTOMATED COUNT: 12.8 % (ref 11.5–14.5)
EST. GFR  (AFRICAN AMERICAN): 58 ML/MIN/1.73 M^2
EST. GFR  (NON AFRICAN AMERICAN): 50.3 ML/MIN/1.73 M^2
FERRITIN SERPL-MCNC: 61 NG/ML (ref 20–300)
GLUCOSE SERPL-MCNC: 109 MG/DL (ref 70–110)
HCT VFR BLD AUTO: 44.1 % (ref 37–48.5)
HGB BLD-MCNC: 14.3 G/DL (ref 12–16)
IMM GRANULOCYTES # BLD AUTO: 0.03 K/UL (ref 0–0.04)
IMM GRANULOCYTES NFR BLD AUTO: 0.4 % (ref 0–0.5)
IRON SERPL-MCNC: 82 UG/DL (ref 30–160)
LYMPHOCYTES # BLD AUTO: 1.7 K/UL (ref 1–4.8)
LYMPHOCYTES NFR BLD: 21.5 % (ref 18–48)
MCH RBC QN AUTO: 29.6 PG (ref 27–31)
MCHC RBC AUTO-ENTMCNC: 32.4 G/DL (ref 32–36)
MCV RBC AUTO: 91 FL (ref 82–98)
MONOCYTES # BLD AUTO: 0.6 K/UL (ref 0.3–1)
MONOCYTES NFR BLD: 7.5 % (ref 4–15)
NEUTROPHILS # BLD AUTO: 4.6 K/UL (ref 1.8–7.7)
NEUTROPHILS NFR BLD: 59.7 % (ref 38–73)
NRBC BLD-RTO: 0 /100 WBC
PLATELET # BLD AUTO: 180 K/UL (ref 150–450)
PMV BLD AUTO: 10.3 FL (ref 9.2–12.9)
POTASSIUM SERPL-SCNC: 4.2 MMOL/L (ref 3.5–5.1)
PROT SERPL-MCNC: 7.1 G/DL (ref 6–8.4)
RBC # BLD AUTO: 4.83 M/UL (ref 4–5.4)
SATURATED IRON: 29 % (ref 20–50)
SODIUM SERPL-SCNC: 142 MMOL/L (ref 136–145)
TOTAL IRON BINDING CAPACITY: 283 UG/DL (ref 250–450)
TRANSFERRIN SERPL-MCNC: 202 MG/DL (ref 200–375)
WBC # BLD AUTO: 7.71 K/UL (ref 3.9–12.7)

## 2022-03-22 PROCEDURE — 85025 COMPLETE CBC W/AUTO DIFF WBC: CPT | Performed by: INTERNAL MEDICINE

## 2022-03-22 PROCEDURE — 80053 COMPREHEN METABOLIC PANEL: CPT | Performed by: INTERNAL MEDICINE

## 2022-03-22 PROCEDURE — 82728 ASSAY OF FERRITIN: CPT | Performed by: INTERNAL MEDICINE

## 2022-03-22 PROCEDURE — 84466 ASSAY OF TRANSFERRIN: CPT | Performed by: INTERNAL MEDICINE

## 2022-03-22 PROCEDURE — 36415 COLL VENOUS BLD VENIPUNCTURE: CPT | Performed by: INTERNAL MEDICINE

## 2022-03-24 NOTE — PROGRESS NOTES
[facility] Hematology/Oncology  PROGRESS NOTE      Subjective:       Patient ID:   NAME: Martita San : 1965     56 y.o. female    Referring Doc: no PCP (prior PCP was Sary)  Other Physicians: Jared Schneider Potheneni, Tabor    Chief Complaint:  anemia    History of Present Illness:     Patient returns today for a regularly scheduled follow-up visit.  The patient was previously followed by Dr Major with dermatology and Dr Early with All/immunology; she last showed for appointment in Dec 2020    She is having left sciatic issues with chronic pain.     She denies any CP, SOB, HA's or N/V.      Everyone is doing well at home.     Discussed covid19 precautions.           ROS:   GEN: normal without any fever, night sweats or weight loss; chronic sciatic issues  HEENT: normal with no HA's, sore throat, stiff neck, changes in vision  CV: normal with no CP, SOB, PND, ANTON or orthopnea  PULM: normal with no SOB, cough, hemoptysis, sputum or pleuritic pain  GI: normal with no abdominal pain, nausea, vomiting, constipation, diarrhea, melanotic stools, BRBPR, or hematemesis  : normal with no hematuria, dysuria  BREAST: normal with no mass, discharge, pain  SKIN: circular raised red spot on skin     Allergies:  Review of patient's allergies indicates:   Allergen Reactions    Pcn [penicillins] Anaphylaxis    Benadryl allergy decongestant     Clindamycin Hives    Influenza virus vaccines     Latex, natural rubber Hives    Oxycodone        Medications:    Current Outpatient Medications:     albuterol (PROVENTIL/VENTOLIN HFA) 90 mcg/actuation inhaler, Inhale 1-2 puffs into the lungs every 6 (six) hours as needed for Wheezing. Rescue, Disp: 6.7 g, Rfl: 0    albuterol-ipratropium (DUO-NEB) 2.5 mg-0.5 mg/3 mL nebulizer solution, Take 3 mLs by nebulization every 6 (six) hours as needed for Wheezing or Shortness of Breath. Rescue, Disp: 1 Box, Rfl: 0    amLODIPine (NORVASC) 10 MG tablet, Take 1 tablet (10 mg total)  "by mouth once daily., Disp: 90 tablet, Rfl: 3    apixaban (ELIQUIS) 5 mg Tab, Take 1 tablet (5 mg total) by mouth once daily., Disp: 180 tablet, Rfl: 3    cloNIDine (CATAPRES) 0.1 MG tablet, Take 0.1 mg by mouth 3 (three) times daily., Disp: , Rfl:     furosemide (LASIX) 20 MG tablet, Take 1 tablet (20 mg total) by mouth once daily., Disp: 90 tablet, Rfl: 3    ibuprofen (ADVIL,MOTRIN) 600 MG tablet, Take 1 tablet (600 mg total) by mouth every 6 (six) hours as needed., Disp: 20 tablet, Rfl: 0    lisinopriL (PRINIVIL,ZESTRIL) 40 MG tablet, Take 1 tablet (40 mg total) by mouth once daily., Disp: 90 tablet, Rfl: 3    metoprolol succinate (TOPROL-XL) 100 MG 24 hr tablet, Take 1 tablet (100 mg total) by mouth once daily., Disp: 90 tablet, Rfl: 3    naproxen (NAPROSYN) 500 MG tablet, Take 1 tablet (500 mg total) by mouth 2 (two) times daily with meals., Disp: 30 tablet, Rfl: 0    triamcinolone acetonide 0.1% (KENALOG) 0.1 % cream, , Disp: , Rfl:     PMHx/PSHx Updates:  See patient's last visit with me on 12/17/2020.  See H&P on 3/17/2017        Pathology:  Cancer Staging  No matching staging information was found for the patient.          Objective:     Vitals:  Blood pressure (!) 153/113, pulse 78, temperature 98.3 °F (36.8 °C), resp. rate 18, height 5' 7" (1.702 m), weight 87.1 kg (192 lb).    Physical Examination:   GEN: no apparent distress, comfortable; AAOx3  HEAD: atraumatic and normocephalic  EYES: no pallor, no icterus, PERRLA  ENT: OMM, no pharyngeal erythema, external ears WNL; no nasal discharge; no thrush  NECK: no masses, thyroid normal, trachea midline, no LAD/LN's, supple  CV: RRR with no murmur; normal pulse; normal S1 and S2; no pedal edema  CHEST: Normal respiratory effort; CTAB; normal breath sounds; no wheeze or crackles  ABDOM: nontender and nondistended; soft; normal bowel sounds; no rebound/guarding  MUSC/Skeletal: ROM normal; no crepitus; joints normal; no deformities or " arthropathy  EXTREM: no clubbing, cyanosis, inflammation or swelling  SKIN: no new lesions or areas of concern ; rash since resolved  : no spivey  NEURO: grossly intact; motor/sensory WNL; AAOx3; no tremors  PSYCH: normal mood, affect and behavior  LYMPH: normal cervical, supraclavicular, axillary and groin LN's            Labs:         Lab Results   Component Value Date    WBC 7.71 03/22/2022    HGB 14.3 03/22/2022    HCT 44.1 03/22/2022    MCV 91 03/22/2022     03/22/2022     BMP  Lab Results   Component Value Date     03/22/2022    K 4.2 03/22/2022     03/22/2022    CO2 24 03/22/2022    BUN 18 03/22/2022    CREATININE 1.2 03/22/2022    CALCIUM 9.5 03/22/2022    ANIONGAP 14 03/22/2022    ESTGFRAFRICA 58.0 (A) 03/22/2022    EGFRNONAA 50.3 (A) 03/22/2022     Lab Results   Component Value Date    PBRYCGQK53 489 12/27/2016     Lab Results   Component Value Date    FOLATE 8.5 12/27/2016     Lab Results   Component Value Date    IRON 82 03/22/2022    TIBC 283 03/22/2022    FERRITIN 61 03/22/2022               Radiology/Diagnostic Studies:    No results found.    I have reviewed all available lab results and radiology reports.    Assessment/Plan:   (1) 54 y.o. female with diagnosis of microcytic anemia  - iron deficiency anemia  - she had been on oral iron supplements previously and then received IV iron through May 2017  - she has been followed by Dr Schneider with GI  - s/p scopes with hiatal hernia, stomach ulcers and H.pylori positive  - capsule endoscopy per patient was fine    12/17/2020:  - latest hgb 14.1 and good  - iron levels adequate currently      3/28/2022:  - latest labs with hgb WNL; B12/folate WNL and iron panel WNl  - eos at 0.8     (2) chronic pain syndrome with chronic neck and back issues  - followed by Dr Loco with Pain management     (3) HTN - followed by her PCP     (4) fibromyalgia and arthritis - followed by Dr Coleman with Rheum     (5) prior alcohol abuse issues    (6)  Skin spots  - followed by Dr Major with derm;  And she sees Dr Early        PLAN:  1. F/u with allergy/immunology and dermatology recommended and encouraged; recommend that she obtain an internist  2. continued on prilosec per GI  3. Check labs every 6 months  4. MVI's  5.  RTC 12 months    F/U with PCP, GI, Derm and Rheum  Fax note to SLY Schneider Olivier, tabor       Discussion:       COVID-19 Discussion:    I had long discussion with patient and any applicable family about the COVID-19 coronavirus epidemic and the recommended precautions with regard to cancer and/or hematology patients. I have re-iterated the CDC recommendations for adequate hand washing, use of hand -like products, and coughing into elbow, etc. In addition, especially for our patients who are on chemotherapy and/or our otherwise immunocompromised patients, I have recommended avoidance of crowds, including movie theaters, restaurants, churches, etc. I have recommended avoidance of any sick or symptomatic family members and/or friends. I have also recommended avoidance of any raw and unwashed food products, and general avoidance of food items that have not been prepared by themselves. The patient has been asked to call us immediately with any symptom developments, issues, questions or other general concerns.     I have explained all of the above in detail and the patient understands all of the current recommendation(s). I have answered all of their questions to the best of my ability and to their complete satisfaction.   The patient is to continue with the current management plan.            Electronically signed by Sukhdeep Rocha MD            Answers for HPI/ROS submitted by the patient on 3/25/2022  appetite change : No  unexpected weight change: No  mouth sores: No  visual disturbance: No  cough: No  shortness of breath: No  chest pain: No  abdominal pain: No  diarrhea: No  frequency: No  back pain: No  rash:  No  headaches: No  adenopathy: No  nervous/ anxious: No

## 2022-03-28 ENCOUNTER — OFFICE VISIT (OUTPATIENT)
Dept: HEMATOLOGY/ONCOLOGY | Facility: CLINIC | Age: 57
End: 2022-03-28
Payer: MEDICARE

## 2022-03-28 VITALS
TEMPERATURE: 98 F | HEIGHT: 67 IN | HEART RATE: 78 BPM | RESPIRATION RATE: 18 BRPM | BODY MASS INDEX: 30.13 KG/M2 | SYSTOLIC BLOOD PRESSURE: 153 MMHG | DIASTOLIC BLOOD PRESSURE: 113 MMHG | WEIGHT: 192 LBS

## 2022-03-28 DIAGNOSIS — D53.9 NUTRITIONAL ANEMIA, UNSPECIFIED: ICD-10-CM

## 2022-03-28 DIAGNOSIS — D50.0 IRON DEFICIENCY ANEMIA DUE TO CHRONIC BLOOD LOSS: Primary | ICD-10-CM

## 2022-03-28 PROCEDURE — 1160F RVW MEDS BY RX/DR IN RCRD: CPT | Mod: S$GLB,,, | Performed by: INTERNAL MEDICINE

## 2022-03-28 PROCEDURE — 3077F PR MOST RECENT SYSTOLIC BLOOD PRESSURE >= 140 MM HG: ICD-10-PCS | Mod: S$GLB,,, | Performed by: INTERNAL MEDICINE

## 2022-03-28 PROCEDURE — 4010F PR ACE/ARB THEARPY RXD/TAKEN: ICD-10-PCS | Mod: S$GLB,,, | Performed by: INTERNAL MEDICINE

## 2022-03-28 PROCEDURE — 3008F BODY MASS INDEX DOCD: CPT | Mod: S$GLB,,, | Performed by: INTERNAL MEDICINE

## 2022-03-28 PROCEDURE — 1160F PR REVIEW ALL MEDS BY PRESCRIBER/CLIN PHARMACIST DOCUMENTED: ICD-10-PCS | Mod: S$GLB,,, | Performed by: INTERNAL MEDICINE

## 2022-03-28 PROCEDURE — 99213 PR OFFICE/OUTPT VISIT, EST, LEVL III, 20-29 MIN: ICD-10-PCS | Mod: S$GLB,,, | Performed by: INTERNAL MEDICINE

## 2022-03-28 PROCEDURE — 3008F PR BODY MASS INDEX (BMI) DOCUMENTED: ICD-10-PCS | Mod: S$GLB,,, | Performed by: INTERNAL MEDICINE

## 2022-03-28 PROCEDURE — 3077F SYST BP >= 140 MM HG: CPT | Mod: S$GLB,,, | Performed by: INTERNAL MEDICINE

## 2022-03-28 PROCEDURE — 3080F PR MOST RECENT DIASTOLIC BLOOD PRESSURE >= 90 MM HG: ICD-10-PCS | Mod: S$GLB,,, | Performed by: INTERNAL MEDICINE

## 2022-03-28 PROCEDURE — 3080F DIAST BP >= 90 MM HG: CPT | Mod: S$GLB,,, | Performed by: INTERNAL MEDICINE

## 2022-03-28 PROCEDURE — 99213 OFFICE O/P EST LOW 20 MIN: CPT | Mod: S$GLB,,, | Performed by: INTERNAL MEDICINE

## 2022-03-28 PROCEDURE — 4010F ACE/ARB THERAPY RXD/TAKEN: CPT | Mod: S$GLB,,, | Performed by: INTERNAL MEDICINE

## 2023-04-03 ENCOUNTER — TELEPHONE (OUTPATIENT)
Dept: HEMATOLOGY/ONCOLOGY | Facility: CLINIC | Age: 58
End: 2023-04-03

## 2023-04-06 ENCOUNTER — LAB VISIT (OUTPATIENT)
Dept: LAB | Facility: HOSPITAL | Age: 58
End: 2023-04-06
Attending: INTERNAL MEDICINE
Payer: MEDICARE

## 2023-04-06 DIAGNOSIS — D53.9 NUTRITIONAL ANEMIA, UNSPECIFIED: ICD-10-CM

## 2023-04-06 DIAGNOSIS — D50.0 IRON DEFICIENCY ANEMIA DUE TO CHRONIC BLOOD LOSS: ICD-10-CM

## 2023-04-06 LAB
ALBUMIN SERPL BCP-MCNC: 3.9 G/DL (ref 3.5–5.2)
ALP SERPL-CCNC: 55 U/L (ref 55–135)
ALT SERPL W/O P-5'-P-CCNC: 18 U/L (ref 10–44)
ANION GAP SERPL CALC-SCNC: 9 MMOL/L (ref 8–16)
AST SERPL-CCNC: 16 U/L (ref 10–40)
BASOPHILS # BLD AUTO: 0.1 K/UL (ref 0–0.2)
BASOPHILS NFR BLD: 1.7 % (ref 0–1.9)
BILIRUB SERPL-MCNC: 0.6 MG/DL (ref 0.1–1)
BUN SERPL-MCNC: 24 MG/DL (ref 6–20)
CALCIUM SERPL-MCNC: 9.1 MG/DL (ref 8.7–10.5)
CHLORIDE SERPL-SCNC: 102 MMOL/L (ref 95–110)
CO2 SERPL-SCNC: 27 MMOL/L (ref 23–29)
CREAT SERPL-MCNC: 1.2 MG/DL (ref 0.5–1.4)
DIFFERENTIAL METHOD: ABNORMAL
EOSINOPHIL # BLD AUTO: 0.5 K/UL (ref 0–0.5)
EOSINOPHIL NFR BLD: 9 % (ref 0–8)
ERYTHROCYTE [DISTWIDTH] IN BLOOD BY AUTOMATED COUNT: 13.2 % (ref 11.5–14.5)
EST. GFR  (NO RACE VARIABLE): 52.5 ML/MIN/1.73 M^2
FOLATE SERPL-MCNC: 10.9 NG/ML (ref 4–24)
GLUCOSE SERPL-MCNC: 86 MG/DL (ref 70–110)
HCT VFR BLD AUTO: 38.3 % (ref 37–48.5)
HGB BLD-MCNC: 12.3 G/DL (ref 12–16)
IMM GRANULOCYTES # BLD AUTO: 0.01 K/UL (ref 0–0.04)
IMM GRANULOCYTES NFR BLD AUTO: 0.2 % (ref 0–0.5)
LYMPHOCYTES # BLD AUTO: 1.6 K/UL (ref 1–4.8)
LYMPHOCYTES NFR BLD: 26.7 % (ref 18–48)
MCH RBC QN AUTO: 29.8 PG (ref 27–31)
MCHC RBC AUTO-ENTMCNC: 32.1 G/DL (ref 32–36)
MCV RBC AUTO: 93 FL (ref 82–98)
MONOCYTES # BLD AUTO: 0.5 K/UL (ref 0.3–1)
MONOCYTES NFR BLD: 8.7 % (ref 4–15)
NEUTROPHILS # BLD AUTO: 3.2 K/UL (ref 1.8–7.7)
NEUTROPHILS NFR BLD: 53.7 % (ref 38–73)
NRBC BLD-RTO: 0 /100 WBC
PLATELET # BLD AUTO: 193 K/UL (ref 150–450)
PMV BLD AUTO: 10.6 FL (ref 9.2–12.9)
POTASSIUM SERPL-SCNC: 4.7 MMOL/L (ref 3.5–5.1)
PROT SERPL-MCNC: 6.6 G/DL (ref 6–8.4)
RBC # BLD AUTO: 4.13 M/UL (ref 4–5.4)
SODIUM SERPL-SCNC: 138 MMOL/L (ref 136–145)
WBC # BLD AUTO: 5.88 K/UL (ref 3.9–12.7)

## 2023-04-06 PROCEDURE — 36415 COLL VENOUS BLD VENIPUNCTURE: CPT | Performed by: INTERNAL MEDICINE

## 2023-04-06 PROCEDURE — 85025 COMPLETE CBC W/AUTO DIFF WBC: CPT | Performed by: INTERNAL MEDICINE

## 2023-04-06 PROCEDURE — 80053 COMPREHEN METABOLIC PANEL: CPT | Performed by: INTERNAL MEDICINE

## 2023-04-06 PROCEDURE — 82746 ASSAY OF FOLIC ACID SERUM: CPT | Performed by: INTERNAL MEDICINE

## 2023-04-09 NOTE — PROGRESS NOTES
[facility] Hematology/Oncology  PROGRESS NOTE      Subjective:       Patient ID:   NAME: Martita San : 1965     57 y.o. female    Referring Doc: no PCP (prior PCP was Sary)  Other Physicians: Jared Schneider Potheneni, Tabor    Chief Complaint:  anemia    History of Present Illness:     Patient returns today for a regularly scheduled follow-up visit. She is here for annual visit.    Energy levels are adequate but can be up and down      She has been on medical marijuana and her chronic pain issues are stable    She denies any CP, SOB, HA's or N/V.        Discussed covid19 precautions.           ROS:   GEN: normal without any fever, night sweats or weight loss; chronic pain issues  HEENT: normal with no HA's, sore throat, stiff neck, changes in vision  CV: normal with no CP, SOB, PND, ANTON or orthopnea  PULM: normal with no SOB, cough, hemoptysis, sputum or pleuritic pain  GI: normal with no abdominal pain, nausea, vomiting, constipation, diarrhea, melanotic stools, BRBPR, or hematemesis  : normal with no hematuria, dysuria  BREAST: normal with no mass, discharge, pain  SKIN: circular raised red spot on skin     Allergies:  Review of patient's allergies indicates:   Allergen Reactions    Pcn [penicillins] Anaphylaxis    Benadryl allergy decongestant     Clindamycin Hives    Influenza virus vaccines     Latex, natural rubber Hives    Oxycodone        Medications:    Current Outpatient Medications:     amLODIPine (NORVASC) 10 MG tablet, TAKE 1 TABLET EVERY DAY, Disp: 90 tablet, Rfl: 3    apixaban (ELIQUIS) 5 mg Tab, Take 1 tablet (5 mg total) by mouth 2 (two) times daily., Disp: 180 tablet, Rfl: 0    cloNIDine (CATAPRES) 0.1 MG tablet, Take 0.1 mg by mouth 3 (three) times daily., Disp: , Rfl:     furosemide (LASIX) 20 MG tablet, TAKE 1 TABLET EVERY DAY, Disp: 90 tablet, Rfl: 3    ibuprofen (ADVIL,MOTRIN) 600 MG tablet, Take 1 tablet (600 mg total) by mouth every 6 (six) hours as needed., Disp: 20 tablet,  "Rfl: 0    lisinopriL (PRINIVIL,ZESTRIL) 40 MG tablet, TAKE 1 TABLET EVERY DAY, Disp: 90 tablet, Rfl: 3    metoprolol succinate (TOPROL-XL) 100 MG 24 hr tablet, Take 1 tablet (100 mg total) by mouth once daily., Disp: 90 tablet, Rfl: 0    albuterol (PROVENTIL/VENTOLIN HFA) 90 mcg/actuation inhaler, Inhale 1-2 puffs into the lungs every 6 (six) hours as needed for Wheezing. Rescue, Disp: 6.7 g, Rfl: 0    albuterol-ipratropium (DUO-NEB) 2.5 mg-0.5 mg/3 mL nebulizer solution, Take 3 mLs by nebulization every 6 (six) hours as needed for Wheezing or Shortness of Breath. Rescue, Disp: 1 Box, Rfl: 0    naproxen (NAPROSYN) 500 MG tablet, Take 1 tablet (500 mg total) by mouth 2 (two) times daily with meals. (Patient not taking: Reported on 4/10/2023), Disp: 30 tablet, Rfl: 0    triamcinolone acetonide 0.1% (KENALOG) 0.1 % cream, , Disp: , Rfl:     PMHx/PSHx Updates:  See patient's last visit with me on  3/28/2022  See H&P on 3/17/2017        Pathology:  Cancer Staging  No matching staging information was found for the patient.          Objective:     Vitals:  Blood pressure (!) 163/101, pulse 64, temperature 97.7 °F (36.5 °C), resp. rate 16, height 5' 7" (1.702 m), weight 87.6 kg (193 lb 3.2 oz).    Physical Examination:   GEN: no apparent distress, comfortable; AAOx3; overweight  HEAD: atraumatic and normocephalic  EYES: no pallor, no icterus, PERRLA  ENT: OMM, no pharyngeal erythema, external ears WNL; no nasal discharge; no thrush  NECK: no masses, thyroid normal, trachea midline, no LAD/LN's, supple  CV: RRR with no murmur; normal pulse; normal S1 and S2; no pedal edema  CHEST: Normal respiratory effort; CTAB; normal breath sounds; no wheeze or crackles  ABDOM: nontender and nondistended; soft; normal bowel sounds; no rebound/guarding  MUSC/Skeletal: ROM normal; no crepitus; joints normal; no deformities or arthropathy  EXTREM: no clubbing, cyanosis, inflammation or swelling  SKIN: no new lesions or areas of concern ;  "   : no spivey  NEURO: grossly intact; motor/sensory WNL; AAOx3; no tremors  PSYCH: normal mood, affect and behavior  LYMPH: normal cervical, supraclavicular, axillary and groin LN's            Labs:         Lab Results   Component Value Date    WBC 5.88 04/06/2023    HGB 12.3 04/06/2023    HCT 38.3 04/06/2023    MCV 93 04/06/2023     04/06/2023     BMP  Lab Results   Component Value Date     04/06/2023    K 4.7 04/06/2023     04/06/2023    CO2 27 04/06/2023    BUN 24 (H) 04/06/2023    CREATININE 1.2 04/06/2023    CALCIUM 9.1 04/06/2023    ANIONGAP 9 04/06/2023    ESTGFRAFRICA 58.0 (A) 03/22/2022    EGFRNONAA 50.3 (A) 03/22/2022     Lab Results   Component Value Date    [id number] 489 12/27/2016     Lab Results   Component Value Date    FOLATE 10.9 04/06/2023     Lab Results   Component Value Date    IRON 82 03/22/2022    TIBC 283 03/22/2022    FERRITIN 61 03/22/2022               Radiology/Diagnostic Studies:    No results found.    I have reviewed all available lab results and radiology reports.    Assessment/Plan:   (1) 54 y.o. female with diagnosis of microcytic anemia  - iron deficiency anemia  - she had been on oral iron supplements previously and then received IV iron through May 2017  - she has been followed by Dr Schneider with GI  - s/p scopes with hiatal hernia, stomach ulcers and H.pylori positive  - capsule endoscopy per patient was fine    12/17/2020:  - latest hgb 14.1 and good  - iron levels adequate currently      3/28/2022:  - latest labs with hgb WNL; B12/folate WNL and iron panel WNl  - eos at 0.8    4/10/2023:  - latest CBC are adequate  - iron panel adequate; eos wnl  - B12/folate adequate  - hgb wnl     (2) chronic pain syndrome with chronic neck and back issues  - followed by Dr Loco with Pain management     (3) HTN - followed by her PCP     (4) fibromyalgia and arthritis - followed by Dr Coleman with Rheum     (5) prior alcohol abuse issues    (6) Skin spots  - followed  by Dr Major with derm;  And she sees Dr Early        PLAN:  1. F/u with allergy/immunology and dermatology recommended and encouraged   2. F/u with GI  3. Check labs every 6 months  4. MVI's  5.  RTC 12 months    F/U with PCP, GI, Derm and Rheum  Fax note to SLY Schneider Olivier, Tabor       Discussion:       COVID-19 Discussion:    I had long discussion with patient and any applicable family about the COVID-19 coronavirus epidemic and the recommended precautions with regard to cancer and/or hematology patients. I have re-iterated the CDC recommendations for adequate hand washing, use of hand -like products, and coughing into elbow, etc. In addition, especially for our patients who are on chemotherapy and/or our otherwise immunocompromised patients, I have recommended avoidance of crowds, including movie theaters, restaurants, churches, etc. I have recommended avoidance of any sick or symptomatic family members and/or friends. I have also recommended avoidance of any raw and unwashed food products, and general avoidance of food items that have not been prepared by themselves. The patient has been asked to call us immediately with any symptom developments, issues, questions or other general concerns.     I have explained all of the above in detail and the patient understands all of the current recommendation(s). I have answered all of their questions to the best of my ability and to their complete satisfaction.   The patient is to continue with the current management plan.            Electronically signed by Sukhdeep Rocha MD

## 2023-04-10 ENCOUNTER — OFFICE VISIT (OUTPATIENT)
Dept: HEMATOLOGY/ONCOLOGY | Facility: CLINIC | Age: 58
End: 2023-04-10
Payer: MEDICARE

## 2023-04-10 VITALS
BODY MASS INDEX: 30.32 KG/M2 | HEART RATE: 64 BPM | SYSTOLIC BLOOD PRESSURE: 163 MMHG | TEMPERATURE: 98 F | HEIGHT: 67 IN | DIASTOLIC BLOOD PRESSURE: 101 MMHG | RESPIRATION RATE: 16 BRPM | WEIGHT: 193.19 LBS

## 2023-04-10 DIAGNOSIS — D50.0 IRON DEFICIENCY ANEMIA DUE TO CHRONIC BLOOD LOSS: Primary | ICD-10-CM

## 2023-04-10 PROCEDURE — 99213 OFFICE O/P EST LOW 20 MIN: CPT | Mod: S$GLB,,, | Performed by: INTERNAL MEDICINE

## 2023-04-10 PROCEDURE — 1160F PR REVIEW ALL MEDS BY PRESCRIBER/CLIN PHARMACIST DOCUMENTED: ICD-10-PCS | Mod: CPTII,S$GLB,, | Performed by: INTERNAL MEDICINE

## 2023-04-10 PROCEDURE — 3080F PR MOST RECENT DIASTOLIC BLOOD PRESSURE >= 90 MM HG: ICD-10-PCS | Mod: CPTII,S$GLB,, | Performed by: INTERNAL MEDICINE

## 2023-04-10 PROCEDURE — 99213 PR OFFICE/OUTPT VISIT, EST, LEVL III, 20-29 MIN: ICD-10-PCS | Mod: S$GLB,,, | Performed by: INTERNAL MEDICINE

## 2023-04-10 PROCEDURE — 1160F RVW MEDS BY RX/DR IN RCRD: CPT | Mod: CPTII,S$GLB,, | Performed by: INTERNAL MEDICINE

## 2023-04-10 PROCEDURE — 4010F PR ACE/ARB THEARPY RXD/TAKEN: ICD-10-PCS | Mod: CPTII,S$GLB,, | Performed by: INTERNAL MEDICINE

## 2023-04-10 PROCEDURE — 3077F SYST BP >= 140 MM HG: CPT | Mod: CPTII,S$GLB,, | Performed by: INTERNAL MEDICINE

## 2023-04-10 PROCEDURE — 1159F PR MEDICATION LIST DOCUMENTED IN MEDICAL RECORD: ICD-10-PCS | Mod: CPTII,S$GLB,, | Performed by: INTERNAL MEDICINE

## 2023-04-10 PROCEDURE — 3008F PR BODY MASS INDEX (BMI) DOCUMENTED: ICD-10-PCS | Mod: CPTII,S$GLB,, | Performed by: INTERNAL MEDICINE

## 2023-04-10 PROCEDURE — 3077F PR MOST RECENT SYSTOLIC BLOOD PRESSURE >= 140 MM HG: ICD-10-PCS | Mod: CPTII,S$GLB,, | Performed by: INTERNAL MEDICINE

## 2023-04-10 PROCEDURE — 1159F MED LIST DOCD IN RCRD: CPT | Mod: CPTII,S$GLB,, | Performed by: INTERNAL MEDICINE

## 2023-04-10 PROCEDURE — 3080F DIAST BP >= 90 MM HG: CPT | Mod: CPTII,S$GLB,, | Performed by: INTERNAL MEDICINE

## 2023-04-10 PROCEDURE — 3008F BODY MASS INDEX DOCD: CPT | Mod: CPTII,S$GLB,, | Performed by: INTERNAL MEDICINE

## 2023-04-10 PROCEDURE — 4010F ACE/ARB THERAPY RXD/TAKEN: CPT | Mod: CPTII,S$GLB,, | Performed by: INTERNAL MEDICINE

## 2023-04-19 ENCOUNTER — OFFICE VISIT (OUTPATIENT)
Dept: URGENT CARE | Facility: CLINIC | Age: 58
End: 2023-04-19
Payer: MEDICARE

## 2023-04-19 VITALS
BODY MASS INDEX: 30.92 KG/M2 | WEIGHT: 197 LBS | RESPIRATION RATE: 18 BRPM | HEIGHT: 67 IN | SYSTOLIC BLOOD PRESSURE: 140 MMHG | OXYGEN SATURATION: 98 % | HEART RATE: 75 BPM | TEMPERATURE: 98 F | DIASTOLIC BLOOD PRESSURE: 90 MMHG

## 2023-04-19 DIAGNOSIS — T14.8XXA FOREIGN BODY IN SKIN: Primary | ICD-10-CM

## 2023-04-19 PROCEDURE — 99499 NO LOS: ICD-10-PCS | Mod: S$GLB,,, | Performed by: PHYSICIAN ASSISTANT

## 2023-04-19 PROCEDURE — 99499 UNLISTED E&M SERVICE: CPT | Mod: S$GLB,,, | Performed by: PHYSICIAN ASSISTANT

## 2023-04-19 RX ORDER — CICLOPIROX 80 MG/ML
SOLUTION TOPICAL
COMMUNITY
Start: 2022-12-13

## 2023-04-19 RX ORDER — NYSTATIN 100000 [USP'U]/G
POWDER TOPICAL
COMMUNITY
Start: 2022-12-13

## 2023-04-19 NOTE — PROGRESS NOTES
"Subjective:      Patient ID: Martita San is a 57 y.o. female.    Vitals:  height is 5' 7" (1.702 m) and weight is 89.4 kg (197 lb). Her oral temperature is 97.6 °F (36.4 °C). Her blood pressure is 140/90 (abnormal) and her pulse is 75. Her respiration is 18 and oxygen saturation is 98%.     Chief Complaint: Foreign Body in Skin    Pt states that "she was playing with her cat yesterday and layed her hand down on the ground to  the cat's toy and she got a splinter in her right index finger. She is experiencing pain and can barely bend her finger. She is not sure if there is still a splinter in her finger."    ROS   Objective:     Physical Exam    Assessment:     No diagnosis found.    Plan:       There are no diagnoses linked to this encounter.                "

## 2023-06-06 ENCOUNTER — OFFICE VISIT (OUTPATIENT)
Dept: CARDIOLOGY | Facility: CLINIC | Age: 58
End: 2023-06-06
Payer: MEDICARE

## 2023-06-06 VITALS
WEIGHT: 195 LBS | BODY MASS INDEX: 30.61 KG/M2 | SYSTOLIC BLOOD PRESSURE: 148 MMHG | OXYGEN SATURATION: 98 % | HEIGHT: 67 IN | RESPIRATION RATE: 16 BRPM | HEART RATE: 71 BPM | DIASTOLIC BLOOD PRESSURE: 92 MMHG

## 2023-06-06 DIAGNOSIS — E78.2 MIXED HYPERLIPIDEMIA: ICD-10-CM

## 2023-06-06 DIAGNOSIS — I10 HTN, GOAL BELOW 130/80: ICD-10-CM

## 2023-06-06 DIAGNOSIS — D50.0 IRON DEFICIENCY ANEMIA DUE TO CHRONIC BLOOD LOSS: ICD-10-CM

## 2023-06-06 DIAGNOSIS — I48.0 PAROXYSMAL ATRIAL FIBRILLATION: Primary | ICD-10-CM

## 2023-06-06 DIAGNOSIS — I67.89 OTHER CEREBROVASCULAR DISEASE: ICD-10-CM

## 2023-06-06 DIAGNOSIS — I48.91 ATRIAL FIBRILLATION, UNSPECIFIED TYPE: ICD-10-CM

## 2023-06-06 DIAGNOSIS — I10 ESSENTIAL HYPERTENSION: ICD-10-CM

## 2023-06-06 PROCEDURE — 3080F DIAST BP >= 90 MM HG: CPT | Mod: CPTII,S$GLB,, | Performed by: INTERNAL MEDICINE

## 2023-06-06 PROCEDURE — 3008F PR BODY MASS INDEX (BMI) DOCUMENTED: ICD-10-PCS | Mod: CPTII,S$GLB,, | Performed by: INTERNAL MEDICINE

## 2023-06-06 PROCEDURE — 1159F MED LIST DOCD IN RCRD: CPT | Mod: CPTII,S$GLB,, | Performed by: INTERNAL MEDICINE

## 2023-06-06 PROCEDURE — 99999 PR PBB SHADOW E&M-EST. PATIENT-LVL IV: ICD-10-PCS | Mod: PBBFAC,,, | Performed by: INTERNAL MEDICINE

## 2023-06-06 PROCEDURE — 93000 EKG 12-LEAD: ICD-10-PCS | Mod: S$GLB,,, | Performed by: INTERNAL MEDICINE

## 2023-06-06 PROCEDURE — 99214 PR OFFICE/OUTPT VISIT, EST, LEVL IV, 30-39 MIN: ICD-10-PCS | Mod: S$GLB,,, | Performed by: INTERNAL MEDICINE

## 2023-06-06 PROCEDURE — 99999 PR PBB SHADOW E&M-EST. PATIENT-LVL IV: CPT | Mod: PBBFAC,,, | Performed by: INTERNAL MEDICINE

## 2023-06-06 PROCEDURE — 1160F PR REVIEW ALL MEDS BY PRESCRIBER/CLIN PHARMACIST DOCUMENTED: ICD-10-PCS | Mod: CPTII,S$GLB,, | Performed by: INTERNAL MEDICINE

## 2023-06-06 PROCEDURE — 3077F SYST BP >= 140 MM HG: CPT | Mod: CPTII,S$GLB,, | Performed by: INTERNAL MEDICINE

## 2023-06-06 PROCEDURE — 1159F PR MEDICATION LIST DOCUMENTED IN MEDICAL RECORD: ICD-10-PCS | Mod: CPTII,S$GLB,, | Performed by: INTERNAL MEDICINE

## 2023-06-06 PROCEDURE — 1160F RVW MEDS BY RX/DR IN RCRD: CPT | Mod: CPTII,S$GLB,, | Performed by: INTERNAL MEDICINE

## 2023-06-06 PROCEDURE — 93000 ELECTROCARDIOGRAM COMPLETE: CPT | Mod: S$GLB,,, | Performed by: INTERNAL MEDICINE

## 2023-06-06 PROCEDURE — 3080F PR MOST RECENT DIASTOLIC BLOOD PRESSURE >= 90 MM HG: ICD-10-PCS | Mod: CPTII,S$GLB,, | Performed by: INTERNAL MEDICINE

## 2023-06-06 PROCEDURE — 4010F ACE/ARB THERAPY RXD/TAKEN: CPT | Mod: CPTII,S$GLB,, | Performed by: INTERNAL MEDICINE

## 2023-06-06 PROCEDURE — 4010F PR ACE/ARB THEARPY RXD/TAKEN: ICD-10-PCS | Mod: CPTII,S$GLB,, | Performed by: INTERNAL MEDICINE

## 2023-06-06 PROCEDURE — 99214 OFFICE O/P EST MOD 30 MIN: CPT | Mod: S$GLB,,, | Performed by: INTERNAL MEDICINE

## 2023-06-06 PROCEDURE — 3008F BODY MASS INDEX DOCD: CPT | Mod: CPTII,S$GLB,, | Performed by: INTERNAL MEDICINE

## 2023-06-06 PROCEDURE — 3077F PR MOST RECENT SYSTOLIC BLOOD PRESSURE >= 140 MM HG: ICD-10-PCS | Mod: CPTII,S$GLB,, | Performed by: INTERNAL MEDICINE

## 2023-06-06 RX ORDER — CYANOCOBALAMIN 1000 UG/ML
50 INJECTION, SOLUTION INTRAMUSCULAR; SUBCUTANEOUS
Qty: 1 ML | Refills: 3 | Status: SHIPPED | OUTPATIENT
Start: 2023-06-06

## 2023-06-06 RX ORDER — IPRATROPIUM BROMIDE AND ALBUTEROL SULFATE 2.5; .5 MG/3ML; MG/3ML
3 SOLUTION RESPIRATORY (INHALATION) EVERY 6 HOURS PRN
Qty: 1 EACH | Refills: 0 | Status: SHIPPED | OUTPATIENT
Start: 2023-06-06 | End: 2023-06-06 | Stop reason: SDUPTHER

## 2023-06-06 RX ORDER — IPRATROPIUM BROMIDE AND ALBUTEROL SULFATE 2.5; .5 MG/3ML; MG/3ML
3 SOLUTION RESPIRATORY (INHALATION) EVERY 6 HOURS PRN
Qty: 360 ML | Refills: 0 | Status: SHIPPED | OUTPATIENT
Start: 2023-06-06 | End: 2023-08-01 | Stop reason: SDUPTHER

## 2023-06-06 NOTE — PROGRESS NOTES
Subjective:    Patient ID:  Martita San is a 57 y.o. female       Chief Complaint   Patient presents with    Atrial Fibrillation    Hypertension       HPI:  Ms  Martita San is a 57 y.o. female is here for follow-up.    Patient has been doing well no specific complaints at the present time.  Last time she saw Dr. Lamar in .    At the present time patient denies any chest pain or tightness heaviness denies any shortness of breath or difficulty in breathing denies any dizziness or lightheadedness or loss of consciousness.    She does have anemia and that has improved over period of time.    Review of patient's allergies indicates:   Allergen Reactions    Clindamycin Hives    Pcn [penicillins] Anaphylaxis    Benadryl allergy decongestant     Diphenhydramine hcl Hives    Influenza virus vaccines     Oxycodone     Adhesive Rash       Past Medical History:   Diagnosis Date    Allergy     Anxiety     Carotid stenosis 2016    Chronic anemia     COPD (chronic obstructive pulmonary disease)     Depression     Fibromyalgia     Osteoarthritis     Pinched nerve     PTSD (post-traumatic stress disorder)     Tachycardia     Thyroid disease     Torn rotator cuff     Uterine fibroid 10/2016    Vertigo      Past Surgical History:   Procedure Laterality Date     SECTION      HYSTERECTOMY      LAMINECTOMY      TONSILLECTOMY       Social History     Tobacco Use    Smoking status: Never    Smokeless tobacco: Never   Substance Use Topics    Alcohol use: No    Drug use: Yes     Types: Marijuana     Family History   Problem Relation Age of Onset    Arthritis Mother     Hypertension Mother     COPD Mother     Depression Mother     Arthritis Father     Depression Father     Hypertension Father     Heart disease Father     COPD Father     Heart attack Father     Asthma Father     Heart attack Maternal Grandmother     Cancer Maternal Grandmother     Hypertension Maternal Grandmother     Hypertension Paternal Grandmother      Stroke Paternal Grandmother     Tuberculosis Paternal Grandfather         Review of Systems:   Constitution: Negative for diaphoresis and fever.   HEENT: Negative for nosebleeds.    Cardiovascular: Negative for chest pain       No dyspnea on exertion       No leg swelling        No palpitations  Respiratory: Negative for shortness of breath and wheezing.    Hematologic/Lymphatic: Negative for bleeding problem. Does not bruise/bleed easily.   Skin: Negative for color change and rash.   Musculoskeletal: Negative for falls and myalgias.   Gastrointestinal: Negative for hematemesis and hematochezia.   Genitourinary: Negative for hematuria.   Neurological: Negative for dizziness and light-headedness.   Psychiatric/Behavioral: Negative for altered mental status and memory loss.          Objective:        Vitals:    06/06/23 1344   BP: (!) 148/92   Pulse: 71   Resp: 16       Lab Results   Component Value Date    WBC 5.88 04/06/2023    HGB 12.3 04/06/2023    HCT 38.3 04/06/2023     04/06/2023    CHOL 207 (H) 01/28/2020    TRIG 137 01/28/2020    HDL 54 01/28/2020    ALT 18 04/06/2023    AST 16 04/06/2023     04/06/2023    K 4.7 04/06/2023     04/06/2023    CREATININE 1.2 04/06/2023    BUN 24 (H) 04/06/2023    CO2 27 04/06/2023    TSH 1.446 12/27/2016        ECHOCARDIOGRAM RESULTS  Results for orders placed during the hospital encounter of 08/25/21    Echo    Interpretation Summary  · Concentric hypertrophy and normal systolic function.  · Moderate left atrial enlargement.  · Mild tricuspid regurgitation.  · The estimated ejection fraction is 70%.  · Normal left ventricular diastolic function.  · Normal right ventricular size with normal right ventricular systolic function.  · Mild-to-moderate mitral regurgitation      Physical Exam:  CONSTITUTIONAL: No fever, no chills  HEENT: Normocephalic, atraumatic,pupils reactive to light                 NECK:  No JVD no carotid bruit  CVS: S1S2+, RRR, systolic  murmurs,   LUNGS: Clear  ABDOMEN: Soft, NT, BS+  EXTREMITIES: No cyanosis, edema  : No spivey catheter  NEURO: AAO X 3  PSY: Normal affect      Medication List with Changes/Refills   Current Medications    ALBUTEROL (PROVENTIL/VENTOLIN HFA) 90 MCG/ACTUATION INHALER    Inhale 1-2 puffs into the lungs every 6 (six) hours as needed for Wheezing. Rescue    ALBUTEROL-IPRATROPIUM (DUO-NEB) 2.5 MG-0.5 MG/3 ML NEBULIZER SOLUTION    Take 3 mLs by nebulization every 6 (six) hours as needed for Wheezing or Shortness of Breath. Rescue    AMLODIPINE (NORVASC) 10 MG TABLET    TAKE 1 TABLET EVERY DAY    CICLOPIROX (PENLAC) 8 % SOLN        CLONIDINE (CATAPRES) 0.1 MG TABLET    Take 0.1 mg by mouth 3 (three) times daily.    FUROSEMIDE (LASIX) 20 MG TABLET    TAKE 1 TABLET EVERY DAY    IBUPROFEN (ADVIL,MOTRIN) 600 MG TABLET    Take 1 tablet (600 mg total) by mouth every 6 (six) hours as needed.    LISINOPRIL (PRINIVIL,ZESTRIL) 40 MG TABLET    TAKE 1 TABLET EVERY DAY    METOPROLOL SUCCINATE (TOPROL-XL) 100 MG 24 HR TABLET    Take 1 tablet (100 mg total) by mouth once daily.    NAPROXEN (NAPROSYN) 500 MG TABLET    Take 1 tablet (500 mg total) by mouth 2 (two) times daily with meals.    NYSTOP POWDER        TRIAMCINOLONE ACETONIDE 0.1% (KENALOG) 0.1 % CREAM                 Assessment:       1. Paroxysmal atrial fibrillation    2. HTN, goal below 130/80    3. Essential hypertension    4. Mixed hyperlipidemia    5. Other cerebrovascular disease     6. Iron deficiency anemia due to chronic blood loss         Plan:   1. Paroxysmal atrial fibrillation   Patient is doing well at the present time and she is currently in sinus rate and she is currently on Eliquis 5 mg p.o. b.i.d. continue the same no bleeding issues of blood in the stool urine.  2. Essential hypertension  Patient's blood pressure is 148/92 and she is currently on lisinopril 40 mg p.o. q.day furosemide 20 mg p.o. q.day amlodipine 10 mg p.o. q.day and metoprolol succinate 100 mg  p.o. q.day. continue the same.  3. Patient to check her blood pressure as soon as she wakes up, before breakfast before lunch and before dinner time and also before going to bed.  And patient to give was 7 days worth of readings.  4. Mixed hyperlipidemia   Patient is is currently not on any specific medications./statins and on her last blood work her total cholesterol is 207 HDL is 54 LDL is 125 and triglycerides of 157.    5. EKG   Reviewed EKG independently patient is in sinus rhythm with a short NV interval with heart rate of 71 beats per minute and normal intervals and no acute ST T-wave changes.  6. Anemia of chronic blood loss  Patient is being followed up by her hematologist.    Discussed with patient that she would benefit from taking B12 peripheral will B12 injections.  The  7. Continue current management and I will see her back in the office in 6 months time.  8. Chronic obstructive pulmonary disease     Patient does use rescue inhalers as needed and she also uses nebulizer at home             Problem List Items Addressed This Visit          Cardiac/Vascular    Atrial fibrillation - Primary       Oncology    Iron deficiency anemia due to chronic blood loss     Other Visit Diagnoses       HTN, goal below 130/80        Essential hypertension        Mixed hyperlipidemia        Other cerebrovascular disease                 No follow-ups on file.

## 2023-06-09 ENCOUNTER — TELEPHONE (OUTPATIENT)
Dept: CARDIOLOGY | Facility: CLINIC | Age: 58
End: 2023-06-09
Payer: MEDICARE

## 2023-06-09 DIAGNOSIS — I48.91 ATRIAL FIBRILLATION, UNSPECIFIED TYPE: Primary | ICD-10-CM

## 2023-06-09 NOTE — TELEPHONE ENCOUNTER
----- Message from Farnaz Velasquez RN sent at 2023 11:47 AM CDT -----  Regardin/6 EKG Order  The EKG performed on 23 is missing an order.  Please place an order so that the EKG can be read.    Thank You,  Farnaz Velasquez RN  [facility]/ [place]   [phone]

## 2023-07-25 ENCOUNTER — HOSPITAL ENCOUNTER (EMERGENCY)
Facility: HOSPITAL | Age: 58
Discharge: HOME | End: 2023-07-25
Attending: EMERGENCY MEDICINE
Payer: MEDICARE

## 2023-07-25 VITALS
WEIGHT: 189 LBS | HEART RATE: 81 BPM | BODY MASS INDEX: 29.66 KG/M2 | HEIGHT: 67 IN | TEMPERATURE: 98 F | SYSTOLIC BLOOD PRESSURE: 176 MMHG | OXYGEN SATURATION: 98 % | RESPIRATION RATE: 19 BRPM | DIASTOLIC BLOOD PRESSURE: 110 MMHG

## 2023-07-25 DIAGNOSIS — T14.8XXA HEMATOMA AND CONTUSION: Primary | ICD-10-CM

## 2023-07-25 DIAGNOSIS — M25.532 WRIST PAIN, ACUTE, LEFT: ICD-10-CM

## 2023-07-25 PROCEDURE — 99282 EMERGENCY DEPT VISIT SF MDM: CPT

## 2023-07-25 NOTE — DISCHARGE INSTRUCTIONS
RETURN TO EMERGENCY DEPARTMENT WITHOUT FAIL, IF YOUR SYMPTOMS WORSEN, IF YOU GET NEW OR DIFFERENT SYMPTOMS, IF YOU ARE UNABLE TO FOLLOW UP AS DIRECTED, OR IF YOU HAVE ANY CONCERNS OR WORRIES.    Use ice, use a compressive Ace wrap.  Follow-up with primary care provider.

## 2023-07-25 NOTE — ED PROVIDER NOTES
Encounter Date: 2023       History     Chief Complaint   Patient presents with    Wrist Pain     Pt states onset of bruise noted while washing pot with baking soda & vinegar. + pain, radiating to elbow.     57-year-old female with past medical history of depression, fibromyalgia, PTSD, torn rotator cuff, anxiety, presents emergency department with left wrist pain.  Patient says she was washing pots and pans with baking soda in vinegar and then started to have pain in her left wrist.  She said it swelled up on her.  She said it was having some tingling in her fingertips 1 through 4.  Said she felt the pain shooting up to her elbow.  She said that she does not think that she hit it on anything but she is unsure she was washing dishes.  She is not on any blood thinners.  No other reported issues.    Review of patient's allergies indicates:   Allergen Reactions    Clindamycin Hives    Pcn [penicillins] Anaphylaxis    Benadryl allergy decongestant     Diphenhydramine hcl Hives    Influenza virus vaccines     Oxycodone     Adhesive Rash     Past Medical History:   Diagnosis Date    Allergy     Anxiety     Carotid stenosis 2016    Chronic anemia     COPD (chronic obstructive pulmonary disease)     Depression     Fibromyalgia     Osteoarthritis     Pinched nerve     PTSD (post-traumatic stress disorder)     Tachycardia     Thyroid disease     Torn rotator cuff     Uterine fibroid 10/2016    Vertigo      Past Surgical History:   Procedure Laterality Date     SECTION      HYSTERECTOMY      LAMINECTOMY      TONSILLECTOMY       Family History   Problem Relation Age of Onset    Arthritis Mother     Hypertension Mother     COPD Mother     Depression Mother     Arthritis Father     Depression Father     Hypertension Father     Heart disease Father     COPD Father     Heart attack Father     Asthma Father     Heart attack Maternal Grandmother     Cancer Maternal Grandmother     Hypertension Maternal Grandmother      Hypertension Paternal Grandmother     Stroke Paternal Grandmother     Tuberculosis Paternal Grandfather      Social History     Tobacco Use    Smoking status: Never    Smokeless tobacco: Never   Substance Use Topics    Alcohol use: No    Drug use: Yes     Types: Marijuana     Review of Systems   Constitutional:  Negative for chills and fever.   HENT:  Negative for sore throat.    Respiratory:  Negative for shortness of breath.    Cardiovascular:  Negative for chest pain and palpitations.   Gastrointestinal:  Negative for abdominal pain, nausea and vomiting.   Genitourinary:  Negative for dysuria.   Musculoskeletal:  Negative for back pain.   Skin:  Positive for wound. Negative for rash.   Neurological:  Negative for weakness and headaches.   Hematological:  Does not bruise/bleed easily.   All other systems reviewed and are negative.    Physical Exam     Initial Vitals [07/25/23 1713]   BP Pulse Resp Temp SpO2   (!) 176/110 81 19 97.7 °F (36.5 °C) 98 %      MAP       --         Physical Exam    Nursing note and vitals reviewed.  Constitutional: She appears well-developed and well-nourished. No distress.   HENT:   Head: Normocephalic and atraumatic.   Mouth/Throat: No oropharyngeal exudate.   Eyes: Conjunctivae and EOM are normal. Pupils are equal, round, and reactive to light.   Neck: No tracheal deviation present.   Musculoskeletal:         General: No tenderness or edema. Normal range of motion.      Comments: Left wrist:  There is hematoma present on the left wrist region distally.  There is pain with palpation..  Please see below picture.  There is a bounding 2+ pulse present in the left radial artery.  There is normal capillary refill distally.  There is no obvious tendon laceration.  Patient able to make a fist and able to flex and extend without difficulty at the wrist level.  Patient has good  strength in the left hand.     Neurological: She is alert and oriented to person, place, and time.   Skin: Skin  is warm. Capillary refill takes less than 2 seconds.   Psychiatric: Her mood appears anxious.             ED Course   Procedures  Labs Reviewed - No data to display       Imaging Results    None          Medications - No data to display  Medical Decision Making:   Differential Diagnosis:   Includes but not limited to sprain, strain, contusion, fracture, laceration, hematoma, abrasion  ED Management:  Emergent evaluation of 57-year-old female presents emergency department with hematoma to the left wrist pain to the left wrist as described above.  She is exhibiting some signs of possible median nerve compressive symptoms as it seems radiating to the 1st through 4th fingertips.  I recommended ice, elevation, maybe a compressive Ace wrap to help with symptoms.  I did recommend x-ray but patient declined and stated that she had to leave to get home to her babies.  Patient declined x-ray she knows I can not rule out fracture dislocation or any other pathology without imaging and she chooses to forego the imaging against medical advice.  She will be discharged home per her wishes, and will return if her symptoms change or worsen.    A dictation software program was used for this note.  Please expect some simple typographical  errors in this note.                             Clinical Impression:   Final diagnoses:  [T14.8XXA] Hematoma and contusion (Primary)  [M25.532] Wrist pain, acute, left        ED Disposition Condition    Discharge Stable          ED Prescriptions    None       Follow-up Information       Follow up With Specialties Details Why Contact Info Additional Information    [facility] - Emergency Dept Emergency Medicine  If symptoms worsen [address]  [facility] [address]  [phone] 1st floor    Your primary care provider  In 3 days                Vic Dow,   07/25/23 7511

## 2023-07-26 RX ORDER — APIXABAN 5 MG/1
TABLET, FILM COATED ORAL
Qty: 180 TABLET | Refills: 3 | Status: SHIPPED | OUTPATIENT
Start: 2023-07-26

## 2023-07-26 RX ORDER — METOPROLOL SUCCINATE 100 MG/1
TABLET, EXTENDED RELEASE ORAL
Qty: 90 TABLET | Refills: 3 | Status: SHIPPED | OUTPATIENT
Start: 2023-07-26

## 2023-08-02 RX ORDER — IPRATROPIUM BROMIDE AND ALBUTEROL SULFATE 2.5; .5 MG/3ML; MG/3ML
3 SOLUTION RESPIRATORY (INHALATION) EVERY 6 HOURS PRN
Qty: 360 ML | Refills: 0 | Status: SHIPPED | OUTPATIENT
Start: 2023-08-02 | End: 2024-08-01

## 2023-11-22 RX ORDER — FUROSEMIDE 20 MG/1
TABLET ORAL
Qty: 90 TABLET | Refills: 3 | Status: SHIPPED | OUTPATIENT
Start: 2023-11-22

## 2023-11-22 RX ORDER — AMLODIPINE BESYLATE 10 MG/1
TABLET ORAL
Qty: 90 TABLET | Refills: 3 | Status: SHIPPED | OUTPATIENT
Start: 2023-11-22

## 2023-11-22 RX ORDER — LISINOPRIL 40 MG/1
TABLET ORAL
Qty: 90 TABLET | Refills: 3 | Status: SHIPPED | OUTPATIENT
Start: 2023-11-22

## 2024-03-27 ENCOUNTER — TELEPHONE (OUTPATIENT)
Dept: HEMATOLOGY/ONCOLOGY | Facility: CLINIC | Age: 59
End: 2024-03-27

## 2024-03-27 DIAGNOSIS — D50.0 IRON DEFICIENCY ANEMIA DUE TO CHRONIC BLOOD LOSS: Primary | ICD-10-CM

## 2024-03-27 DIAGNOSIS — D53.9 NUTRITIONAL ANEMIA, UNSPECIFIED: ICD-10-CM

## 2024-04-01 ENCOUNTER — TELEPHONE (OUTPATIENT)
Dept: HEMATOLOGY/ONCOLOGY | Facility: CLINIC | Age: 59
End: 2024-04-01

## 2024-04-01 NOTE — TELEPHONE ENCOUNTER
I spoke with pt and reminded her to have labs done prior to appt here on 4/8/24 pt verbalized understanding.

## 2024-05-06 ENCOUNTER — TELEPHONE (OUTPATIENT)
Dept: HEMATOLOGY/ONCOLOGY | Facility: CLINIC | Age: 59
End: 2024-05-06

## 2024-05-06 NOTE — TELEPHONE ENCOUNTER
I spoke with pt and reminded her to have labs done prior to appt here on 5/13/24 pt verbalized understanding.

## 2024-05-09 ENCOUNTER — LAB VISIT (OUTPATIENT)
Dept: LAB | Facility: HOSPITAL | Age: 59
End: 2024-05-09
Attending: INTERNAL MEDICINE
Payer: MEDICARE

## 2024-05-09 DIAGNOSIS — D50.0 IRON DEFICIENCY ANEMIA DUE TO CHRONIC BLOOD LOSS: ICD-10-CM

## 2024-05-09 DIAGNOSIS — D53.9 NUTRITIONAL ANEMIA, UNSPECIFIED: ICD-10-CM

## 2024-05-09 LAB
ALBUMIN SERPL BCP-MCNC: 4 G/DL (ref 3.5–5.2)
ALP SERPL-CCNC: 64 U/L (ref 55–135)
ALT SERPL W/O P-5'-P-CCNC: 12 U/L (ref 10–44)
ANION GAP SERPL CALC-SCNC: 3 MMOL/L (ref 8–16)
AST SERPL-CCNC: 13 U/L (ref 10–40)
BASOPHILS # BLD AUTO: 0.07 K/UL (ref 0–0.2)
BASOPHILS NFR BLD: 1.2 % (ref 0–1.9)
BILIRUB SERPL-MCNC: 0.4 MG/DL (ref 0.1–1)
BUN SERPL-MCNC: 15 MG/DL (ref 6–20)
CALCIUM SERPL-MCNC: 9.3 MG/DL (ref 8.7–10.5)
CHLORIDE SERPL-SCNC: 106 MMOL/L (ref 95–110)
CO2 SERPL-SCNC: 31 MMOL/L (ref 23–29)
CREAT SERPL-MCNC: 1.2 MG/DL (ref 0.5–1.4)
DIFFERENTIAL METHOD BLD: ABNORMAL
EOSINOPHIL # BLD AUTO: 0.5 K/UL (ref 0–0.5)
EOSINOPHIL NFR BLD: 8.4 % (ref 0–8)
ERYTHROCYTE [DISTWIDTH] IN BLOOD BY AUTOMATED COUNT: 13.6 % (ref 11.5–14.5)
EST. GFR  (NO RACE VARIABLE): 52.1 ML/MIN/1.73 M^2
FERRITIN SERPL-MCNC: 41.9 NG/ML (ref 20–300)
FOLATE SERPL-MCNC: 7.1 NG/ML (ref 4–24)
GLUCOSE SERPL-MCNC: 104 MG/DL (ref 70–110)
HCT VFR BLD AUTO: 41.4 % (ref 37–48.5)
HGB BLD-MCNC: 13.5 G/DL (ref 12–16)
IMM GRANULOCYTES # BLD AUTO: 0.01 K/UL (ref 0–0.04)
IMM GRANULOCYTES NFR BLD AUTO: 0.2 % (ref 0–0.5)
IRON SERPL-MCNC: 57 UG/DL (ref 30–160)
LYMPHOCYTES # BLD AUTO: 1.7 K/UL (ref 1–4.8)
LYMPHOCYTES NFR BLD: 29.5 % (ref 18–48)
MCH RBC QN AUTO: 30.2 PG (ref 27–31)
MCHC RBC AUTO-ENTMCNC: 32.6 G/DL (ref 32–36)
MCV RBC AUTO: 93 FL (ref 82–98)
MONOCYTES # BLD AUTO: 0.5 K/UL (ref 0.3–1)
MONOCYTES NFR BLD: 9.1 % (ref 4–15)
NEUTROPHILS # BLD AUTO: 2.9 K/UL (ref 1.8–7.7)
NEUTROPHILS NFR BLD: 51.6 % (ref 38–73)
NRBC BLD-RTO: 0 /100 WBC
PLATELET # BLD AUTO: 183 K/UL (ref 150–450)
PMV BLD AUTO: 10.1 FL (ref 9.2–12.9)
POTASSIUM SERPL-SCNC: 4 MMOL/L (ref 3.5–5.1)
PROT SERPL-MCNC: 6.5 G/DL (ref 6–8.4)
RBC # BLD AUTO: 4.47 M/UL (ref 4–5.4)
SATURATED IRON: 21 % (ref 20–50)
SODIUM SERPL-SCNC: 140 MMOL/L (ref 136–145)
TOTAL IRON BINDING CAPACITY: 273 UG/DL (ref 250–450)
TRANSFERRIN SERPL-MCNC: 195 MG/DL (ref 200–375)
WBC # BLD AUTO: 5.62 K/UL (ref 3.9–12.7)

## 2024-05-09 PROCEDURE — 82746 ASSAY OF FOLIC ACID SERUM: CPT | Performed by: INTERNAL MEDICINE

## 2024-05-09 PROCEDURE — 85025 COMPLETE CBC W/AUTO DIFF WBC: CPT | Performed by: INTERNAL MEDICINE

## 2024-05-09 PROCEDURE — 80053 COMPREHEN METABOLIC PANEL: CPT | Performed by: INTERNAL MEDICINE

## 2024-05-09 PROCEDURE — 82728 ASSAY OF FERRITIN: CPT | Performed by: INTERNAL MEDICINE

## 2024-05-09 PROCEDURE — 83540 ASSAY OF IRON: CPT | Performed by: INTERNAL MEDICINE

## 2024-05-09 PROCEDURE — 36415 COLL VENOUS BLD VENIPUNCTURE: CPT | Performed by: INTERNAL MEDICINE

## 2024-05-13 ENCOUNTER — LAB VISIT (OUTPATIENT)
Dept: LAB | Facility: HOSPITAL | Age: 59
End: 2024-05-13
Attending: INTERNAL MEDICINE
Payer: MEDICARE

## 2024-05-13 ENCOUNTER — TELEPHONE (OUTPATIENT)
Dept: HEMATOLOGY/ONCOLOGY | Facility: CLINIC | Age: 59
End: 2024-05-13

## 2024-05-13 ENCOUNTER — OFFICE VISIT (OUTPATIENT)
Dept: HEMATOLOGY/ONCOLOGY | Facility: CLINIC | Age: 59
End: 2024-05-13
Payer: MEDICARE

## 2024-05-13 VITALS
HEART RATE: 80 BPM | BODY MASS INDEX: 29.41 KG/M2 | HEIGHT: 67 IN | WEIGHT: 187.38 LBS | SYSTOLIC BLOOD PRESSURE: 159 MMHG | TEMPERATURE: 98 F | RESPIRATION RATE: 18 BRPM | DIASTOLIC BLOOD PRESSURE: 121 MMHG

## 2024-05-13 DIAGNOSIS — E55.9 VITAMIN D DEFICIENCY: Primary | ICD-10-CM

## 2024-05-13 DIAGNOSIS — E55.9 VITAMIN D DEFICIENCY: ICD-10-CM

## 2024-05-13 DIAGNOSIS — D53.9 NUTRITIONAL ANEMIA, UNSPECIFIED: ICD-10-CM

## 2024-05-13 DIAGNOSIS — D64.9 ANEMIA, UNSPECIFIED TYPE: ICD-10-CM

## 2024-05-13 DIAGNOSIS — D50.0 IRON DEFICIENCY ANEMIA DUE TO CHRONIC BLOOD LOSS: ICD-10-CM

## 2024-05-13 DIAGNOSIS — D50.0 IRON DEFICIENCY ANEMIA DUE TO CHRONIC BLOOD LOSS: Primary | ICD-10-CM

## 2024-05-13 LAB
25(OH)D3+25(OH)D2 SERPL-MCNC: 38 NG/ML (ref 30–96)
VIT B12 SERPL-MCNC: 378 PG/ML (ref 210–950)

## 2024-05-13 PROCEDURE — 3077F SYST BP >= 140 MM HG: CPT | Mod: CPTII,S$GLB,, | Performed by: INTERNAL MEDICINE

## 2024-05-13 PROCEDURE — 82306 VITAMIN D 25 HYDROXY: CPT | Performed by: INTERNAL MEDICINE

## 2024-05-13 PROCEDURE — 3008F BODY MASS INDEX DOCD: CPT | Mod: CPTII,S$GLB,, | Performed by: INTERNAL MEDICINE

## 2024-05-13 PROCEDURE — 82607 VITAMIN B-12: CPT | Performed by: INTERNAL MEDICINE

## 2024-05-13 PROCEDURE — 3080F DIAST BP >= 90 MM HG: CPT | Mod: CPTII,S$GLB,, | Performed by: INTERNAL MEDICINE

## 2024-05-13 PROCEDURE — 1159F MED LIST DOCD IN RCRD: CPT | Mod: CPTII,S$GLB,, | Performed by: INTERNAL MEDICINE

## 2024-05-13 PROCEDURE — 1160F RVW MEDS BY RX/DR IN RCRD: CPT | Mod: CPTII,S$GLB,, | Performed by: INTERNAL MEDICINE

## 2024-05-13 PROCEDURE — 36415 COLL VENOUS BLD VENIPUNCTURE: CPT | Performed by: INTERNAL MEDICINE

## 2024-05-13 PROCEDURE — 99213 OFFICE O/P EST LOW 20 MIN: CPT | Mod: S$GLB,,, | Performed by: INTERNAL MEDICINE
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
## 2024-05-14 ENCOUNTER — OFFICE VISIT (OUTPATIENT)
Dept: OTOLARYNGOLOGY | Facility: CLINIC | Age: 59
End: 2024-05-14
Payer: MEDICARE

## 2024-05-14 VITALS — HEIGHT: 67 IN | WEIGHT: 187 LBS | BODY MASS INDEX: 29.35 KG/M2

## 2024-05-14 DIAGNOSIS — J34.89 REFRACTORY OBSTRUCTION OF NASAL AIRWAY: ICD-10-CM

## 2024-05-14 DIAGNOSIS — I10 POORLY-CONTROLLED HYPERTENSION: ICD-10-CM

## 2024-05-14 DIAGNOSIS — G47.33 OSA (OBSTRUCTIVE SLEEP APNEA): ICD-10-CM

## 2024-05-14 DIAGNOSIS — J31.2 CHRONIC SORE THROAT: Primary | ICD-10-CM

## 2024-05-14 PROCEDURE — 99999 PR PBB SHADOW E&M-EST. PATIENT-LVL IV: CPT | Mod: PBBFAC,,, | Performed by: OTOLARYNGOLOGY

## 2024-05-14 PROCEDURE — 99204 OFFICE O/P NEW MOD 45 MIN: CPT | Mod: S$GLB,,, | Performed by: OTOLARYNGOLOGY

## 2024-05-14 PROCEDURE — 1160F RVW MEDS BY RX/DR IN RCRD: CPT | Mod: CPTII,S$GLB,, | Performed by: OTOLARYNGOLOGY

## 2024-05-14 PROCEDURE — 1159F MED LIST DOCD IN RCRD: CPT | Mod: CPTII,S$GLB,, | Performed by: OTOLARYNGOLOGY

## 2024-05-14 PROCEDURE — 3008F BODY MASS INDEX DOCD: CPT | Mod: CPTII,S$GLB,, | Performed by: OTOLARYNGOLOGY

## 2024-05-14 RX ORDER — AZELASTINE 1 MG/ML
SPRAY, METERED NASAL
Qty: 30 ML | Refills: 11 | Status: SHIPPED | OUTPATIENT
Start: 2024-05-14

## 2024-05-14 RX ORDER — OMEPRAZOLE 40 MG/1
40 CAPSULE, DELAYED RELEASE ORAL
Qty: 60 CAPSULE | Refills: 3 | Status: SHIPPED | OUTPATIENT
Start: 2024-05-14 | End: 2024-09-11

## 2024-05-14 RX ORDER — FLUTICASONE PROPIONATE 50 MCG
2 SPRAY, SUSPENSION (ML) NASAL NIGHTLY
Qty: 15.8 ML | Refills: 11 | Status: SHIPPED | OUTPATIENT
Start: 2024-05-14 | End: 2025-05-14

## 2024-05-14 NOTE — PROGRESS NOTES
Subjective:       Patient ID: Martita San is a 58 y.o. female.    Chief Complaint: Sore Throat (Pt c/o sore throat and dry mouth. )      This patient comes in with a couple of problems but she sets it up with basically I am here because I have a sore throat all the time    As we expound on that she also mentions dry mouth trouble breathing through her nose she has a sleep apnea diagnosis but it is unable to tolerate CPAP and we checked her blood pressure was quite high and as I look at her flow sheet it is always quite high sometimes dangerously high.  She mentions that she has a cardiologist and they only see her once a year she considers that her blood pressure Dr. but I do not think that is the right way to manage things especially given how out of whack the blood pressure readings have been available in her chart today.    She mentions that she smokes but what she means is that she is on medical marijuana for pain control having been taken off of narcotics and benzos and muscle relaxers years ago.  She does not smoke tobacco.          Objective:      ENT Physical Exam    So her tympanic membranes and ear canals are fine     Her nasal exam shows a little congestion but nothing obvious     Her oropharynx shows generalized redness it is dry looking she does not have any tonsil tissue or particularly obstructing anatomy    Her neck is without adenopathy.            Assessment:       1. Chronic sore throat    2. Refractory obstruction of nasal airway    3. Poorly-controlled hypertension    4. SELMA (obstructive sleep apnea)         Plan:          So she clearly needs some help controlling her blood pressure and her once ear cardiology visit I do not think the right approach for that so I am putting in a referral for her to see primary care in [place].      In addition I have sent in b.i.d. PPIs, she has a untreated sleep apnea which makes her highly at risk for reflux and I think that has likely a component, I am  putting her on nasal sprays for nasal airway obstruction, and I am going to see her back in a month or so to discuss her progress on these topics.

## 2024-07-09 ENCOUNTER — OFFICE VISIT (OUTPATIENT)
Dept: CARDIOLOGY | Facility: CLINIC | Age: 59
End: 2024-07-09
Payer: MEDICARE

## 2024-07-09 VITALS
SYSTOLIC BLOOD PRESSURE: 150 MMHG | HEIGHT: 67 IN | DIASTOLIC BLOOD PRESSURE: 90 MMHG | HEART RATE: 97 BPM | BODY MASS INDEX: 30.61 KG/M2 | OXYGEN SATURATION: 98 % | RESPIRATION RATE: 16 BRPM | WEIGHT: 195 LBS

## 2024-07-09 DIAGNOSIS — E78.2 MIXED HYPERLIPIDEMIA: ICD-10-CM

## 2024-07-09 DIAGNOSIS — D50.0 IRON DEFICIENCY ANEMIA DUE TO CHRONIC BLOOD LOSS: ICD-10-CM

## 2024-07-09 DIAGNOSIS — F41.8 DEPRESSION WITH ANXIETY: ICD-10-CM

## 2024-07-09 DIAGNOSIS — I73.9 PAD (PERIPHERAL ARTERY DISEASE): ICD-10-CM

## 2024-07-09 DIAGNOSIS — R60.0 BILATERAL LOWER EXTREMITY EDEMA: ICD-10-CM

## 2024-07-09 DIAGNOSIS — I10 ESSENTIAL HYPERTENSION: ICD-10-CM

## 2024-07-09 DIAGNOSIS — I48.0 PAROXYSMAL ATRIAL FIBRILLATION: Primary | ICD-10-CM

## 2024-07-09 DIAGNOSIS — I50.30 DIASTOLIC CONGESTIVE HEART FAILURE, UNSPECIFIED HF CHRONICITY: ICD-10-CM

## 2024-07-09 DIAGNOSIS — L30.0 NUMMULAR DERMATITIS: ICD-10-CM

## 2024-07-09 LAB
OHS QRS DURATION: 86 MS
OHS QTC CALCULATION: 486 MS

## 2024-07-09 PROCEDURE — 3080F DIAST BP >= 90 MM HG: CPT | Mod: CPTII,S$GLB,, | Performed by: INTERNAL MEDICINE

## 2024-07-09 PROCEDURE — 1159F MED LIST DOCD IN RCRD: CPT | Mod: CPTII,S$GLB,, | Performed by: INTERNAL MEDICINE

## 2024-07-09 PROCEDURE — 3077F SYST BP >= 140 MM HG: CPT | Mod: CPTII,S$GLB,, | Performed by: INTERNAL MEDICINE

## 2024-07-09 PROCEDURE — 99999 PR PBB SHADOW E&M-EST. PATIENT-LVL IV: CPT | Mod: PBBFAC,,, | Performed by: INTERNAL MEDICINE

## 2024-07-09 PROCEDURE — 1160F RVW MEDS BY RX/DR IN RCRD: CPT | Mod: CPTII,S$GLB,, | Performed by: INTERNAL MEDICINE

## 2024-07-09 PROCEDURE — 99214 OFFICE O/P EST MOD 30 MIN: CPT | Mod: S$GLB,,, | Performed by: INTERNAL MEDICINE

## 2024-07-09 PROCEDURE — 3008F BODY MASS INDEX DOCD: CPT | Mod: CPTII,S$GLB,, | Performed by: INTERNAL MEDICINE

## 2024-07-09 RX ORDER — HYDRALAZINE HYDROCHLORIDE 25 MG/1
25 TABLET, FILM COATED ORAL 3 TIMES DAILY
Qty: 90 TABLET | Refills: 11 | Status: SHIPPED | OUTPATIENT
Start: 2024-07-09 | End: 2025-07-09

## 2024-07-09 RX ORDER — NEBIVOLOL 10 MG/1
10 TABLET ORAL DAILY
Qty: 30 TABLET | Refills: 11 | Status: SHIPPED | OUTPATIENT
Start: 2024-07-09 | End: 2025-07-09

## 2024-07-09 NOTE — PROGRESS NOTES
Subjective:    Patient ID:  Martita San is a 58 y.o. female     Chief Complaint   Patient presents with    Medication Refill    Atrial Fibrillation    Hypertension       HPI:  Ms  Martita San is a 58 y.o. female follow-up.    Patient had been having rash on her chest and on the upper part of the body and it had been on and off intermittently depending on the amount of medication she had been taking especially the metoprolol.  She was evaluated by her allergy immunology specialist and was found to have that she is allergic to metoprolol and it causes her to have rashes in the upper parts of the body.    Patient also noticed that her legs are more swollen intermittently.  She has been on amlodipine and also she has sedentary lifestyle she actually needs and constantine is sitting in the chair for extended period of time watching TV.    Her breathing is good denies any shortness a breath or difficulty in breathing she was recently evaluated by her ENT for congestion and sinus congestion.  Patient denies any chest pain or tightness or heaviness denies any dizziness or lightheadedness or loss of consciousness.  She denies any palpitations.    Review of patient's allergies indicates:   Allergen Reactions    Clindamycin Hives    Pcn [penicillins] Anaphylaxis    Benadryl allergy decongestant     Diphenhydramine hcl Hives    Influenza virus vaccines     Oxycodone     Adhesive Rash       Past Medical History:   Diagnosis Date    Allergy     Anxiety     Carotid stenosis 2016    Chronic anemia     COPD (chronic obstructive pulmonary disease)     Depression     Fibromyalgia     Osteoarthritis     Pinched nerve     PTSD (post-traumatic stress disorder)     Tachycardia     Thyroid disease     Torn rotator cuff     Uterine fibroid 10/2016    Vertigo      Past Surgical History:   Procedure Laterality Date     SECTION      HYSTERECTOMY      LAMINECTOMY      TONSILLECTOMY       Social History     Tobacco Use    Smoking  status: Never    Smokeless tobacco: Never   Substance Use Topics    Alcohol use: No    Drug use: Yes     Types: Marijuana     Family History   Problem Relation Name Age of Onset    Arthritis Mother      Hypertension Mother      COPD Mother      Depression Mother      Arthritis Father      Depression Father      Hypertension Father      Heart disease Father      COPD Father      Heart attack Father      Asthma Father      Heart attack Maternal Grandmother      Cancer Maternal Grandmother      Hypertension Maternal Grandmother      Hypertension Paternal Grandmother      Stroke Paternal Grandmother      Tuberculosis Paternal Grandfather          Review of Systems:   Constitution: Negative for diaphoresis and fever.   HEENT: Negative for nosebleeds.    Cardiovascular: Negative for chest pain       No dyspnea on exertion       No leg swelling        No palpitations  Respiratory: Negative for shortness of breath and wheezing.    Hematologic/Lymphatic: Negative for bleeding problem. Does not bruise/bleed easily.   Skin: Negative for color change and she has had intermittent rash.   Musculoskeletal: Negative for falls and myalgias.   Gastrointestinal: Negative for hematemesis and hematochezia.   Genitourinary: Negative for hematuria.   Neurological: Negative for dizziness and light-headedness.   Psychiatric/Behavioral: Negative for altered mental status and memory loss.          Objective:        Vitals:    07/09/24 1400   BP: (!) 150/90   Pulse: 97   Resp: 16       Lab Results   Component Value Date    WBC 5.62 05/09/2024    HGB 13.5 05/09/2024    HCT 41.4 05/09/2024     05/09/2024    CHOL 207 (H) 01/28/2020    TRIG 137 01/28/2020    HDL 54 01/28/2020    ALT 12 05/09/2024    AST 13 05/09/2024     05/09/2024    K 4.0 05/09/2024     05/09/2024    CREATININE 1.2 05/09/2024    BUN 15 05/09/2024    CO2 31 (H) 05/09/2024    TSH 1.446 12/27/2016        ECHOCARDIOGRAM RESULTS  Results for orders placed during  the hospital encounter of 08/25/21    Echo    Interpretation Summary  · Concentric hypertrophy and normal systolic function.  · Moderate left atrial enlargement.  · Mild tricuspid regurgitation.  · The estimated ejection fraction is 70%.  · Normal left ventricular diastolic function.  · Normal right ventricular size with normal right ventricular systolic function.  · Mild-to-moderate mitral regurgitation.        CURRENT/PREVIOUS VISIT EKG  Results for orders placed or performed in visit on 06/06/23   IN OFFICE EKG 12-LEAD (to [facility])    Collection Time: 06/06/23  1:42 PM    Narrative    Test Reason : I48.91,    Vent. Rate : 071 BPM     Atrial Rate : 071 BPM     P-R Int : 148 ms          QRS Dur : 088 ms      QT Int : 408 ms       P-R-T Axes : -21 069 003 degrees     QTc Int : 443 ms    Program found technically poor ECG  Nonspecific ST and T wave abnormality  Normal ECG  When compared with ECG of 26-DEC-2016 20:42,  T wave inversion now evident in Inferior leads  Confirmed by Rahul Obrien MD ([id number]) on 6/14/2023 5:51:17 PM    Referred By:             Confirmed By:Rahul Obrien MD     No valid procedures specified.   No results found for this or any previous visit.      Physical Exam:  CONSTITUTIONAL: No fever, no chills  HEENT: Normocephalic, atraumatic,pupils reactive to light                 NECK:  No JVD no carotid bruit  CVS: S1S2+, RRR, systolic murmurs,   LUNGS: Clear  ABDOMEN: Soft, NT, BS+  EXTREMITIES: No cyanosis, edema  : No spivey catheter  NEURO: AAO X 3  PSY: Normal affect      Medication List with Changes/Refills   Current Medications    ALBUTEROL (PROVENTIL/VENTOLIN HFA) 90 MCG/ACTUATION INHALER    Inhale 1-2 puffs into the lungs every 6 (six) hours as needed for Wheezing. Rescue    ALBUTEROL-IPRATROPIUM (DUO-NEB) 2.5 MG-0.5 MG/3 ML NEBULIZER SOLUTION    Take 3 mLs by nebulization every 6 (six) hours as needed for Wheezing or Shortness of Breath. Rescue    AMLODIPINE (NORVASC) 10 MG TABLET    TAKE 1  TABLET EVERY DAY    AZELASTINE (ASTELIN) 137 MCG (0.1 %) NASAL SPRAY    2 sprays into each side of nose before bed every night, may use in AM also    CICLOPIROX (PENLAC) 8 % SOLN        CLONIDINE (CATAPRES) 0.1 MG TABLET    Take 0.1 mg by mouth 3 (three) times daily.    ELIQUIS 5 MG TAB    TAKE 1 TABLET TWICE DAILY    FLUTICASONE PROPIONATE (FLONASE) 50 MCG/ACTUATION NASAL SPRAY    2 sprays (100 mcg total) by Each Nostril route every evening. 2 sprays into each nostril before bed every night    FUROSEMIDE (LASIX) 20 MG TABLET    TAKE 1 TABLET EVERY DAY    IBUPROFEN (ADVIL,MOTRIN) 600 MG TABLET    Take 1 tablet (600 mg total) by mouth every 6 (six) hours as needed.    LISINOPRIL (PRINIVIL,ZESTRIL) 40 MG TABLET    TAKE 1 TABLET EVERY DAY    METOPROLOL SUCCINATE (TOPROL-XL) 100 MG 24 HR TABLET    TAKE 1 TABLET ONE TIME DAILY    NYSTOP POWDER        OMEPRAZOLE (PRILOSEC) 40 MG CAPSULE    Take 1 capsule (40 mg total) by mouth 2 (two) times daily before meals.    TRIAMCINOLONE ACETONIDE 0.1% (KENALOG) 0.1 % CREAM       Discontinued Medications    CYANOCOBALAMIN 1,000 MCG/ML INJECTION    inject 1 ml under the skin every 28 days    NAPROXEN (NAPROSYN) 500 MG TABLET    Take 1 tablet (500 mg total) by mouth 2 (two) times daily with meals.             Assessment:       1. Paroxysmal atrial fibrillation    2. PAD (peripheral artery disease)    3. Diastolic congestive heart failure, unspecified HF chronicity    4. Essential hypertension    5. Mixed hyperlipidemia    6. Nummular dermatitis    7. Depression with anxiety/PTSD    8. Iron deficiency anemia due to chronic blood loss         Plan:   1. Paroxysmal atrial fibrillation   Patient is doing well at the present time.  She is on Eliquis 5 mg p.o. b.i.d. continue the same no bleeding issues.  She is also on metoprolol succinate 100 mg p.o. daily will change that to Bystolic 10 mg p.o. daily and see how she tolerates it.      2. PAD   Patient is stable at the present time.   Continue her Eliquis for the present time.      3. Diastolic heart failure   Patient is currently on lisinopril 40 mg p.o. daily furosemide 20 mg p.o. daily metoprolol succinate 100 mg p.o. q.day continue the same.    4. Essential hypertension   Her blood pressure is not adequately controlled is 150/90 and had been that way intermittently.    Need to add hydralazine 12.5 mg p.o. Q t.i.d. and increase the dosage to 25 mg p.o. t.i.d. and see how she tolerates it.    5. Mixed hyperlipidemia   She is currently not on statin therapy.  And she follows up with the primary physician in regards with the statin therapy.  Currently patient does not have primary doctor but she is going to see somebody very soon.      6. Nummular dermatitis   Will stop metoprolol as per the dermatologist/immunologist and continue Bystolic in its place.    7. Iron-deficiency anemia   Patient follows up with the primary physician in regards with it.    8. Bilateral lower extremity edema   It is multifactorial.  She is on amlodipine 10 mg p.o. daily and had been on it for the past few years however the swelling in the legs seem to be more noticeable.  And she is taking furosemide 20 mg p.o. daily and she is still has swelling in the legs.  She is also very sedentary and does not move much.  Which is the main contributing factor to her swelling and patient would benefit from keeping her legs elevated and wearing compression stockings.    9. EKG   Reviewed EKG independently patient is in atrial fibrillation with a heart rate of 87 beats per minute poor R-wave progression in the anterior precordial leads and nonspecific ST T-wave changes.    10. I will see her back in the office in 6-8 weeks.    Also given instruction to patient in regards with the medication.            Problem List Items Addressed This Visit          Psychiatric    Depression with anxiety/PTSD       Derm    Nummular dermatitis       Cardiac/Vascular    CHF (congestive heart failure)     Atrial fibrillation - Primary    Relevant Orders    IN OFFICE EKG 12-LEAD (to [facility])    PAD (peripheral artery disease)       Oncology    Iron deficiency anemia due to chronic blood loss    Overview     IMO LOAD Q4          Other Visit Diagnoses       Essential hypertension        Mixed hyperlipidemia                No follow-ups on file.

## 2024-07-18 ENCOUNTER — OFFICE VISIT (OUTPATIENT)
Dept: FAMILY MEDICINE | Facility: CLINIC | Age: 59
End: 2024-07-18
Payer: MEDICARE

## 2024-07-18 VITALS
TEMPERATURE: 98 F | HEART RATE: 83 BPM | OXYGEN SATURATION: 98 % | BODY MASS INDEX: 30.63 KG/M2 | HEIGHT: 67 IN | SYSTOLIC BLOOD PRESSURE: 124 MMHG | WEIGHT: 195.13 LBS | DIASTOLIC BLOOD PRESSURE: 72 MMHG

## 2024-07-18 DIAGNOSIS — J44.9 CHRONIC OBSTRUCTIVE PULMONARY DISEASE, UNSPECIFIED COPD TYPE: ICD-10-CM

## 2024-07-18 DIAGNOSIS — R79.9 ABNORMAL FINDING OF BLOOD CHEMISTRY, UNSPECIFIED: ICD-10-CM

## 2024-07-18 DIAGNOSIS — E78.2 MIXED HYPERLIPIDEMIA: ICD-10-CM

## 2024-07-18 DIAGNOSIS — G47.33 OSA (OBSTRUCTIVE SLEEP APNEA): ICD-10-CM

## 2024-07-18 DIAGNOSIS — I48.0 PAROXYSMAL ATRIAL FIBRILLATION: ICD-10-CM

## 2024-07-18 DIAGNOSIS — N18.31 CHRONIC KIDNEY DISEASE, STAGE 3A: ICD-10-CM

## 2024-07-18 DIAGNOSIS — Z76.89 ENCOUNTER TO ESTABLISH CARE: Primary | ICD-10-CM

## 2024-07-18 DIAGNOSIS — D50.0 IRON DEFICIENCY ANEMIA DUE TO CHRONIC BLOOD LOSS: ICD-10-CM

## 2024-07-18 DIAGNOSIS — I50.32 CHRONIC DIASTOLIC HEART FAILURE: ICD-10-CM

## 2024-07-18 DIAGNOSIS — Z12.31 ENCOUNTER FOR SCREENING MAMMOGRAM FOR MALIGNANT NEOPLASM OF BREAST: ICD-10-CM

## 2024-07-18 DIAGNOSIS — J01.10 ACUTE NON-RECURRENT FRONTAL SINUSITIS: ICD-10-CM

## 2024-07-18 DIAGNOSIS — I10 ESSENTIAL HYPERTENSION: ICD-10-CM

## 2024-07-18 DIAGNOSIS — J44.9 CHRONIC OBSTRUCTIVE PULMONARY DISEASE, UNSPECIFIED COPD TYPE: Primary | ICD-10-CM

## 2024-07-18 PROCEDURE — 3074F SYST BP LT 130 MM HG: CPT | Mod: CPTII,S$GLB,, | Performed by: STUDENT IN AN ORGANIZED HEALTH CARE EDUCATION/TRAINING PROGRAM

## 2024-07-18 PROCEDURE — 3078F DIAST BP <80 MM HG: CPT | Mod: CPTII,S$GLB,, | Performed by: STUDENT IN AN ORGANIZED HEALTH CARE EDUCATION/TRAINING PROGRAM

## 2024-07-18 PROCEDURE — 1160F RVW MEDS BY RX/DR IN RCRD: CPT | Mod: CPTII,S$GLB,, | Performed by: STUDENT IN AN ORGANIZED HEALTH CARE EDUCATION/TRAINING PROGRAM

## 2024-07-18 PROCEDURE — 96372 THER/PROPH/DIAG INJ SC/IM: CPT | Mod: S$GLB,,, | Performed by: STUDENT IN AN ORGANIZED HEALTH CARE EDUCATION/TRAINING PROGRAM

## 2024-07-18 PROCEDURE — 99999 PR PBB SHADOW E&M-EST. PATIENT-LVL V: CPT | Mod: PBBFAC,,, | Performed by: STUDENT IN AN ORGANIZED HEALTH CARE EDUCATION/TRAINING PROGRAM

## 2024-07-18 PROCEDURE — 3008F BODY MASS INDEX DOCD: CPT | Mod: CPTII,S$GLB,, | Performed by: STUDENT IN AN ORGANIZED HEALTH CARE EDUCATION/TRAINING PROGRAM

## 2024-07-18 PROCEDURE — 1159F MED LIST DOCD IN RCRD: CPT | Mod: CPTII,S$GLB,, | Performed by: STUDENT IN AN ORGANIZED HEALTH CARE EDUCATION/TRAINING PROGRAM

## 2024-07-18 PROCEDURE — 99204 OFFICE O/P NEW MOD 45 MIN: CPT | Mod: 25,S$GLB,, | Performed by: STUDENT IN AN ORGANIZED HEALTH CARE EDUCATION/TRAINING PROGRAM

## 2024-07-18 RX ORDER — FUROSEMIDE 20 MG/1
20 TABLET ORAL DAILY
Qty: 90 TABLET | Refills: 3 | Status: SHIPPED | OUTPATIENT
Start: 2024-07-18 | End: 2025-07-18

## 2024-07-18 RX ORDER — AMLODIPINE BESYLATE 10 MG/1
10 TABLET ORAL DAILY
Qty: 90 TABLET | Refills: 3 | Status: SHIPPED | OUTPATIENT
Start: 2024-07-18 | End: 2025-07-18

## 2024-07-18 RX ORDER — LISINOPRIL 40 MG/1
40 TABLET ORAL DAILY
Qty: 90 TABLET | Refills: 3 | Status: SHIPPED | OUTPATIENT
Start: 2024-07-18 | End: 2025-07-18

## 2024-07-18 RX ORDER — METHYLPREDNISOLONE ACETATE 40 MG/ML
40 INJECTION, SUSPENSION INTRA-ARTICULAR; INTRALESIONAL; INTRAMUSCULAR; SOFT TISSUE
Status: COMPLETED | OUTPATIENT
Start: 2024-07-18 | End: 2024-07-18

## 2024-07-18 RX ADMIN — METHYLPREDNISOLONE ACETATE 40 MG: 40 INJECTION, SUSPENSION INTRA-ARTICULAR; INTRALESIONAL; INTRAMUSCULAR; SOFT TISSUE at 02:07

## 2024-07-18 NOTE — PROGRESS NOTES
"[facility] Note    Subjective:  Chief Complaint:   Chief Complaint   Patient presents with    Establish Care       History of Present Illness:  Martita is here for establishing care    Headache and sinus pressure x 8 days  Started about 2 days after changing bp meds  BP has been stable  5/10 constant pain across forehead   Feels like prior sinusitis   +blurred vision, sinus pressure , dry itchy eyes  Tried flonase, saline nasal spray   Denies f/c, n/v/d, cough, neck stiffness, photophobia , slurred speech, weakness     Red flags: Denies all   "Worst ha ever" or "thunderclap"  Neurologic signs or seizures  Persistent after exertion  Progressively increased severity  Systemic c/o (fever, htn, myalgia, wt loss)      HTN  Recently seen by Cardiology this month   Discontinued metoprolol 2/2 allergy (rash)   Increased hydralazine 25 mg tid   Amlodipine 10 daily   Clonidine 0.1 - not taking   Furosemide 20 daily   Lisinopril 40 daily   Nebivolol 10 daily    Eliquis 5    GERD  Omeprazole 40 bid     Chronic left ankle swelling   Pain of right dorsal aspect of foot s/p dropping can - declined imaging or podiatry referral at this time      Mammo due  CRC screening 2-3 years ago with Dr. Devine in [place] - 10 year repeat colonoscopy   Recommend COVId, Shingles, Pneumo vaccinations.     Screening  Health Maintenance         Date Due Completion Date    Annual UACr Never done ---    Pneumococcal Vaccines (Age 0-64) (1 of 2 - PCV) Never done ---    HIV Screening Never done ---    Hemoglobin A1c (Diabetic Prevention Screening) Never done ---    Colorectal Cancer Screening Never done ---    Shingles Vaccine (1 of 2) Never done ---    Mammogram 02/13/2018 2/13/2017    Override on 1/1/2015: Done    COVID-19 Vaccine (1 - 2023-24 season) Never done ---    TETANUS VACCINE 01/01/2024 1/1/2014 (Done)    Override on 1/1/2014: Done    Influenza Vaccine (1) 09/01/2024 ---    Eye Exam 11/25/2024 11/25/2023    Override on " 1/1/2015: Done    Lipid Panel 01/28/2025 1/28/2020    Override on 1/7/2017: Done          Immunization History   Administered Date(s) Administered    Td (ADULT) 01/01/2014     The ASCVD Risk score (Augustine CARDONA, et al., 2019) failed to calculate for the following reasons:    Cannot find a previous HDL lab    Cannot find a previous total cholesterol lab    Allergies:  Review of patient's allergies indicates:   Allergen Reactions    Clindamycin Hives    Pcn [penicillins] Anaphylaxis    Benadryl allergy decongestant     Diphenhydramine hcl Hives    Influenza virus vaccines     Oxycodone     Adhesive Rash       Home Medications:  Current Outpatient Medications on File Prior to Visit   Medication Sig    albuterol (PROVENTIL/VENTOLIN HFA) 90 mcg/actuation inhaler Inhale 1-2 puffs into the lungs every 6 (six) hours as needed for Wheezing. Rescue    albuterol-ipratropium (DUO-NEB) 2.5 mg-0.5 mg/3 mL nebulizer solution Take 3 mLs by nebulization every 6 (six) hours as needed for Wheezing or Shortness of Breath. Rescue    azelastine (ASTELIN) 137 mcg (0.1 %) nasal spray 2 sprays into each side of nose before bed every night, may use in AM also    ciclopirox (PENLAC) 8 % Soln     cloNIDine (CATAPRES) 0.1 MG tablet Take 0.1 mg by mouth 3 (three) times daily.    fluticasone propionate (FLONASE) 50 mcg/actuation nasal spray 2 sprays (100 mcg total) by Each Nostril route every evening. 2 sprays into each nostril before bed every night    hydrALAZINE (APRESOLINE) 25 MG tablet Take 1 tablet (25 mg total) by mouth 3 (three) times daily.    ibuprofen (ADVIL,MOTRIN) 600 MG tablet Take 1 tablet (600 mg total) by mouth every 6 (six) hours as needed.    nebivoloL (BYSTOLIC) 10 MG Tab Take 1 tablet (10 mg total) by mouth once daily.    NYSTOP powder     omeprazole (PRILOSEC) 40 MG capsule Take 1 capsule (40 mg total) by mouth 2 (two) times daily before meals.    triamcinolone acetonide 0.1% (KENALOG) 0.1 % cream     [DISCONTINUED] amLODIPine  (NORVASC) 10 MG tablet TAKE 1 TABLET EVERY DAY    [DISCONTINUED] ELIQUIS 5 mg Tab TAKE 1 TABLET TWICE DAILY    [DISCONTINUED] furosemide (LASIX) 20 MG tablet TAKE 1 TABLET EVERY DAY    [DISCONTINUED] lisinopriL (PRINIVIL,ZESTRIL) 40 MG tablet TAKE 1 TABLET EVERY DAY     No current facility-administered medications on file prior to visit.       Past Medical History:   Diagnosis Date    Allergy     Anxiety     Carotid stenosis 2016    Chronic anemia     COPD (chronic obstructive pulmonary disease)     Depression     Fibromyalgia     Osteoarthritis     Pinched nerve     PTSD (post-traumatic stress disorder)     Tachycardia     Thyroid disease     Torn rotator cuff     Uterine fibroid 10/2016    Vertigo      Past Surgical History:   Procedure Laterality Date     SECTION      HYSTERECTOMY      LAMINECTOMY      TONSILLECTOMY       Family History   Problem Relation Name Age of Onset    Arthritis Mother      Hypertension Mother      COPD Mother      Depression Mother      Arthritis Father      Depression Father      Hypertension Father      Heart disease Father      COPD Father      Heart attack Father      Asthma Father      Heart attack Maternal Grandmother      Cancer Maternal Grandmother      Hypertension Maternal Grandmother      Hypertension Paternal Grandmother      Stroke Paternal Grandmother      Tuberculosis Paternal Grandfather       Social History     Tobacco Use    Smoking status: Never    Smokeless tobacco: Never   Substance Use Topics    Alcohol use: No    Drug use: Yes     Types: Marijuana            The patient's past medical history, surgical history, social history, family history, allergies and medications have been reviewed.    Review of Systems     10 point review of systems was conducted and only the pertinent positives and pertinent negatives are noted above in the HPI section.    Physical Examination  General appearance: alert, cooperative, no distress  HEENT: normocephalic, atraumatic,  "PERRLA, nontender on sinus percussion   Neck: trachea midline, no LAD,   no neck stiffness  Lungs: clear to auscultation, no wheezes, rales or rhonchi, symmetric air entry  Heart: normal rate, irregular rhythm, normal S1, S2, no murmurs, rubs, clicks or gallops  Skin: No rashes or abnormal skin lesions, no apparent jaundice or bruising  Extremities: Full ROM of all extremities, peripheral pulses normal, no unilateral leg swelling or calf tenderness , left ankle swelling   Neurological:alert, oriented, normal speech, no new focal findings or movement disorder noted from baseline      BP Readings from Last 3 Encounters:   07/18/24 124/72   07/09/24 (!) 150/90   05/13/24 (!) 159/121     Wt Readings from Last 3 Encounters:   07/18/24 88.5 kg (195 lb 1.7 oz)   07/09/24 88.5 kg (195 lb)   05/14/24 84.8 kg (187 lb)     /72 (BP Location: Left arm, Patient Position: Sitting, BP Method: Medium (Manual))   Pulse 83   Temp 98 °F (36.7 °C) (Oral)   Ht 5' 7" (1.702 m)   Wt 88.5 kg (195 lb 1.7 oz)   SpO2 98%   BMI 30.56 kg/m²    274}  Laboratory: I have reviewed old labs below:    274}    Lab Results   Component Value Date    WBC 5.62 05/09/2024    HGB 13.5 05/09/2024    HCT 41.4 05/09/2024    MCV 93 05/09/2024     05/09/2024     05/09/2024    K 4.0 05/09/2024     05/09/2024    CALCIUM 9.3 05/09/2024    CO2 31 (H) 05/09/2024     05/09/2024    BUN 15 05/09/2024    CREATININE 1.2 05/09/2024    EGFRNORACEVR 52.1 (A) 05/09/2024    ANIONGAP 3 (L) 05/09/2024    PROT 6.5 05/09/2024    ALBUMIN 4.0 05/09/2024    BILITOT 0.4 05/09/2024    ALKPHOS 64 05/09/2024    ALT 12 05/09/2024    AST 13 05/09/2024    CHOL 207 (H) 01/28/2020    TRIG 137 01/28/2020    HDL 54 01/28/2020    LDLCALC 125.6 01/28/2020    TSH 1.446 12/27/2016      Lab reviewed by me: Particular labs of significance that I will monitor, workup, or treat to improve are mentioned below in diagnostic impression remarks.    Imaging/EKG: I have " reviewed the pertinent results and my findings are noted in remarks.  274}    CC:   Chief Complaint   Patient presents with    Establish Care        274}    Assessment/Plan  Martita aSn is a 58 y.o. female who presents to clinic with:  1. Encounter to establish care    2. Essential hypertension    3. SELMA (obstructive sleep apnea)    4. Chronic obstructive pulmonary disease, unspecified COPD type    5. Chronic kidney disease, stage 3a    6. Paroxysmal atrial fibrillation    7. Chronic diastolic heart failure    8. Mixed hyperlipidemia    9. Iron deficiency anemia due to chronic blood loss    10. Acute non-recurrent frontal sinusitis    11. Abnormal finding of blood chemistry, unspecified    12. Encounter for screening mammogram for malignant neoplasm of breast       274}  Diagnostic Impression Remarks       58 y.o. female who presents to clinic today for establishing care    This is the extent of this pleasant patient's concerns at this present time.  I also discussed the importance of close follow up to discuss labs, change or modify her medications if needed, monitor side effects, and further evaluation of medical problems.     Additional workup planned: see labs ordered below.    See below for labs and meds ordered with associated diagnosis      1. Encounter to establish care  - Hemoglobin A1C; Future  - Lipid Panel; Future    2. Essential hypertension  - Ambulatory referral/consult to Family Practice  - amLODIPine (NORVASC) 10 MG tablet; Take 1 tablet (10 mg total) by mouth once daily.  Dispense: 90 tablet; Refill: 3  - furosemide (LASIX) 20 MG tablet; Take 1 tablet (20 mg total) by mouth once daily.  Dispense: 90 tablet; Refill: 3  - lisinopriL (PRINIVIL,ZESTRIL) 40 MG tablet; Take 1 tablet (40 mg total) by mouth once daily.  Dispense: 90 tablet; Refill: 3  Stable. Goal 120/80  Continue current regimen - would like to r/o other causes of headache prior to changing medication 2/2 possible s/e   Monitor    3.  SELMA (obstructive sleep apnea)  - Ambulatory referral/consult to Family Practice    4. Chronic obstructive pulmonary disease, unspecified COPD type  Stable. Continue albuterol prn     5. Chronic kidney disease, stage 3a  - Hemoglobin A1C; Future  - Microalbumin/creatinine urine ratio  Computed KFRE 5-Year unavailable. One or more values for this score either were not found within the given timeframe or did not fit some other criterion.  Avoid NSAIDs    6. Paroxysmal atrial fibrillation  - apixaban (ELIQUIS) 5 mg Tab; Take 1 tablet (5 mg total) by mouth 2 (two) times daily.  Dispense: 180 tablet; Refill: 3  Rate controlled     7. Chronic diastolic heart failure  ECHO EF 70% (2021) . Continue with Cards as scheduled     8. Mixed hyperlipidemia  - Lipid Panel; Future  Discussed statin therapy - no recent lipid panel     9. Iron deficiency anemia due to chronic blood loss  Stable. Continue trend . Continue with Hematology as scheduled     10. Acute non-recurrent frontal sinusitis  - methylPREDNISolone acetate injection 40 mg  Continue nasal sprays, conservative management   RTC if headache not resolved   Denies red flags    11. Abnormal finding of blood chemistry, unspecified  - Hemoglobin A1C; Future    12. Encounter for screening mammogram for malignant neoplasm of breast  - Mammo Digital Screening Bilat w/ Abdiel; Future        Patient to reach out if headache not improved with steroid   RTC annually or prn     Amy Adam MD, [facility]   Family Medicine Physician  07/18/2024      If you are due for any health screening(s) below please notify me so we can arrange them to be ordered and scheduled to maintain your health.     Health Maintenance Due   Topic    Colorectal Cancer Screening     Mammogram        Breast Cancer Screening    Breast cancer is the second most common cancer in women after skin cancer, and the second leading cause of death from cancer after lung cancer. Mammograms can detect breast cancer early,  which significantly increases the chances of curing the cancer.      A screening mammogram is an x-ray image of the breasts used for early breast cancer detection. It can help reduce the number of deaths from breast cancer among women. To get a clear image, the breast is placed between two plastic plates to make it flat. How often a mammogram is needed depends on your age and your breast cancer risk.    Colon Cancer Screening    Of cancers affecting both men and women, colorectal cancer is the third leading cancer killer in the [place]. But it doesnt have to be. Screening can prevent colorectal cancer or find it at an early stage when treatment often leads to a cure.    A colonoscopy is the preferred test for detecting colon cancer. It is needed only once every 10 years if results are negative. While sedated, a flexible, lighted tube with a tiny camera is inserted into the rectum and advanced through the colon to look for cancers. An alternative screening test that is used at home and returned to the lab may also be used. It detects hidden blood in bowel movements which could indicate cancer in the colon. If results are positive, you will need a colonoscopy to determine if the blood is a sign of cancer. This type of follow up (diagnostic) colonoscopy usually requires additional copays as required by your insurance provider. Please contact your PCP if you have any questions.              The following information is provided to all patients.  This information is to help you find resources for any of the problems found today that may be affecting your health:                Living healthy guide: www.[facility].[place].gov       Understanding Diabetes: www.diabetes.org       Eating healthy: www.cdc.gov/healthyweight      CDC home safety checklist: www.cdc.gov/steadi/patient.html      Agency on Aging: www.goea.[place].gov       Alcoholics anonymous (AA): www.aa.org      Physical Activity: www.gracie.nih.gov/xq1mwmg      "  Tobacco use: www.quitwithusla.org         Patient Instructions   Encouraged patient to get lots of rest and hydrate with plenty of fluids.  Recommended very bland diet for the next 24-48 hours.   Avoid spicy foods and fast food/greasy food until symptoms have resolved.  Advised patient to apply ice pack on the forehead, base of the skull and/or over the eye to help with pain relief.  Advised patient to avoid "screen time" for the next 24 hours because rest is most important without light or sound.  When you get home, advised patient to get a dark and quiet room and rest. Bright glaring light and loud noise can worsen headaches.  OTC NSAIDs every 6 hours as needed for headache, fever or pain.  Keep headache diary as discussed to identify possible triggers.  Advised patient to follow-up appointment with PCP in the next 24 hours for further evaluation as needed.  Advised patient to go to the nearest ER for further evaluation if any of the following occurs:   - Worsening of your head pain or no improvement within 12 hours  - Repeated vomiting (unable to keep liquids down)  - Fever over 100.0ºF (37.8ºC)  - Stiff neck  - Extreme drowsiness, confusion or fainting  - Dizziness, vertigo (dizziness with spinning sensation)  - Weakness of an arm or leg or one side of the face  - Difficulty with speech or vision  Patient aware, verbalized understanding and agreed with patient's plan of care.      INSTRUCTIONS:  - Rest.  - Drink plenty of fluids.  - Take Tylenol and/or Ibuprofen as directed as needed for fever/pain.  Do not take more than the recommended dose.  - follow up with your PCP within the next 1-2 weeks as needed.  - You must understand that you have received an Urgent Care treatment only and that you may be released before all of your medical problems are known or treated.   - You, the patient, will arrange for follow up care as instructed.   - If your condition worsens or fails to improve we recommend that you " receive another evaluation at the ER immediately or contact your PCP to discuss your concerns.   - You can call [phone] or [phone] to help schedule an appointment with the appropriate provider.     -If you smoke cigarettes, it would be beneficial for you to stop.

## 2024-07-18 NOTE — PROGRESS NOTES
[facility] Note    Subjective:  Chief Complaint:   No chief complaint on file.      History of Present Illness:  Martita is here for establishing care    #    Recommend ** vaccinations.     Screening  Health Maintenance         Date Due Completion Date    HIV Screening Never done ---    Hemoglobin A1c (Diabetic Prevention Screening) Never done ---    Colorectal Cancer Screening Never done ---    Shingles Vaccine (1 of 2) Never done ---    Mammogram 02/13/2018 2/13/2017    Override on 1/1/2015: Done    COVID-19 Vaccine (1 - 2023-24 season) Never done ---    TETANUS VACCINE 01/01/2024 1/1/2014 (Done)    Override on 1/1/2014: Done    Influenza Vaccine (1) 09/01/2024 ---    Eye Exam 11/25/2024 11/25/2023    Override on 1/1/2015: Done    Lipid Panel 01/28/2025 1/28/2020    Override on 1/7/2017: Done          Immunization History   Administered Date(s) Administered    Td (ADULT) 01/01/2014     The ASCVD Risk score (Augustine CARDONA, et al., 2019) failed to calculate for the following reasons:    Cannot find a previous HDL lab    Cannot find a previous total cholesterol lab    Allergies:  Review of patient's allergies indicates:   Allergen Reactions    Clindamycin Hives    Pcn [penicillins] Anaphylaxis    Benadryl allergy decongestant     Diphenhydramine hcl Hives    Influenza virus vaccines     Oxycodone     Adhesive Rash       Home Medications:  Current Outpatient Medications on File Prior to Visit   Medication Sig    albuterol (PROVENTIL/VENTOLIN HFA) 90 mcg/actuation inhaler Inhale 1-2 puffs into the lungs every 6 (six) hours as needed for Wheezing. Rescue    albuterol-ipratropium (DUO-NEB) 2.5 mg-0.5 mg/3 mL nebulizer solution Take 3 mLs by nebulization every 6 (six) hours as needed for Wheezing or Shortness of Breath. Rescue    amLODIPine (NORVASC) 10 MG tablet TAKE 1 TABLET EVERY DAY    azelastine (ASTELIN) 137 mcg (0.1 %) nasal spray 2 sprays into each side of nose before bed every night, may use in AM also     ciclopirox (PENLAC) 8 % Soln     cloNIDine (CATAPRES) 0.1 MG tablet Take 0.1 mg by mouth 3 (three) times daily.    ELIQUIS 5 mg Tab TAKE 1 TABLET TWICE DAILY    fluticasone propionate (FLONASE) 50 mcg/actuation nasal spray 2 sprays (100 mcg total) by Each Nostril route every evening. 2 sprays into each nostril before bed every night    furosemide (LASIX) 20 MG tablet TAKE 1 TABLET EVERY DAY    hydrALAZINE (APRESOLINE) 25 MG tablet Take 1 tablet (25 mg total) by mouth 3 (three) times daily.    ibuprofen (ADVIL,MOTRIN) 600 MG tablet Take 1 tablet (600 mg total) by mouth every 6 (six) hours as needed.    lisinopriL (PRINIVIL,ZESTRIL) 40 MG tablet TAKE 1 TABLET EVERY DAY    nebivoloL (BYSTOLIC) 10 MG Tab Take 1 tablet (10 mg total) by mouth once daily.    NYSTOP powder     omeprazole (PRILOSEC) 40 MG capsule Take 1 capsule (40 mg total) by mouth 2 (two) times daily before meals.    triamcinolone acetonide 0.1% (KENALOG) 0.1 % cream      No current facility-administered medications on file prior to visit.       Past Medical History:   Diagnosis Date    Allergy     Anxiety     Carotid stenosis 2016    Chronic anemia     COPD (chronic obstructive pulmonary disease)     Depression     Fibromyalgia     Osteoarthritis     Pinched nerve     PTSD (post-traumatic stress disorder)     Tachycardia     Thyroid disease     Torn rotator cuff     Uterine fibroid 10/2016    Vertigo      Past Surgical History:   Procedure Laterality Date     SECTION      HYSTERECTOMY      LAMINECTOMY      TONSILLECTOMY       Family History   Problem Relation Name Age of Onset    Arthritis Mother      Hypertension Mother      COPD Mother      Depression Mother      Arthritis Father      Depression Father      Hypertension Father      Heart disease Father      COPD Father      Heart attack Father      Asthma Father      Heart attack Maternal Grandmother      Cancer Maternal Grandmother      Hypertension Maternal Grandmother      Hypertension  Paternal Grandmother      Stroke Paternal Grandmother      Tuberculosis Paternal Grandfather       Social History     Tobacco Use    Smoking status: Never    Smokeless tobacco: Never   Substance Use Topics    Alcohol use: No    Drug use: Yes     Types: Marijuana            The patient's past medical history, surgical history, social history, family history, allergies and medications have been reviewed.    Review of Systems     10 point review of systems was conducted and only the pertinent positives and pertinent negatives are noted above in the HPI section.    Physical Examination  General appearance: alert, cooperative, no distress  HEENT: normocephalic, atraumatic, PERRLA, TMs visualized bilaterally not impacted by cerumen, no nasal septal deviation, oropharynx mucosa pink and moist without tonsillar exudates  Neck: trachea midline, no LAD, no thyromegaly, no neck stiffness  Lungs: clear to auscultation, no wheezes, rales or rhonchi, symmetric air entry  Heart: normal rate, regular rhythm, normal S1, S2, no murmurs, rubs, clicks or gallops  Abdomen: soft, nontender, nondistended, no rigidity, rebound, or guarding.   Back: no point tenderness over spine  Skin: No rashes or abnormal skin lesions, no apparent jaundice or bruising  Extremities: Full ROM of all extremities, peripheral pulses normal, no unilateral leg swelling or calf tenderness   Neurological:alert, oriented, normal speech, no new focal findings or movement disorder noted from baseline      BP Readings from Last 3 Encounters:   07/09/24 (!) 150/90   05/13/24 (!) 159/121   07/25/23 (!) 176/110     Wt Readings from Last 3 Encounters:   07/09/24 88.5 kg (195 lb)   05/14/24 84.8 kg (187 lb)   05/13/24 85 kg (187 lb 6.4 oz)     There were no vitals taken for this visit.   274}  Laboratory: I have reviewed old labs below:    274}    Lab Results   Component Value Date    WBC 5.62 05/09/2024    HGB 13.5 05/09/2024    HCT 41.4 05/09/2024    MCV 93 05/09/2024      05/09/2024     05/09/2024    K 4.0 05/09/2024     05/09/2024    CALCIUM 9.3 05/09/2024    CO2 31 (H) 05/09/2024     05/09/2024    BUN 15 05/09/2024    CREATININE 1.2 05/09/2024    EGFRNORACEVR 52.1 (A) 05/09/2024    ANIONGAP 3 (L) 05/09/2024    PROT 6.5 05/09/2024    ALBUMIN 4.0 05/09/2024    BILITOT 0.4 05/09/2024    ALKPHOS 64 05/09/2024    ALT 12 05/09/2024    AST 13 05/09/2024    CHOL 207 (H) 01/28/2020    TRIG 137 01/28/2020    HDL 54 01/28/2020    LDLCALC 125.6 01/28/2020    TSH 1.446 12/27/2016      Lab reviewed by me: Particular labs of significance that I will monitor, workup, or treat to improve are mentioned below in diagnostic impression remarks.    Imaging/EKG: I have reviewed the pertinent results and my findings are noted in remarks.  274}    CC: No chief complaint on file.       274}    Assessment/Plan  Martita San is a 58 y.o. female who presents to clinic with:  1. Poorly-controlled hypertension    2. SELMA (obstructive sleep apnea)       274}  Diagnostic Impression Remarks       58 y.o. female who presents to clinic today for establishing care    This is the extent of this pleasant patient's concerns at this present time.  I also discussed the importance of close follow up to discuss labs, change or modify her medications if needed, monitor side effects, and further evaluation of medical problems.     Additional workup planned: see labs ordered below.    See below for labs and meds ordered with associated diagnosis      1. Poorly-controlled hypertension  - Ambulatory referral/consult to Family Practice    2. SELMA (obstructive sleep apnea)  - Ambulatory referral/consult to Family Practice          RTC annually or prn     Amy Adam MD, [facility]   Family Medicine Physician  07/18/2024      If you are due for any health screening(s) below please notify me so we can arrange them to be ordered and scheduled to maintain your health.     Health Maintenance Due   Topic     Colorectal Cancer Screening     Mammogram        Breast Cancer Screening    Breast cancer is the second most common cancer in women after skin cancer, and the second leading cause of death from cancer after lung cancer. Mammograms can detect breast cancer early, which significantly increases the chances of curing the cancer.      A screening mammogram is an x-ray image of the breasts used for early breast cancer detection. It can help reduce the number of deaths from breast cancer among women. To get a clear image, the breast is placed between two plastic plates to make it flat. How often a mammogram is needed depends on your age and your breast cancer risk.    Colon Cancer Screening    Of cancers affecting both men and women, colorectal cancer is the third leading cancer killer in the [place]. But it doesnt have to be. Screening can prevent colorectal cancer or find it at an early stage when treatment often leads to a cure.    A colonoscopy is the preferred test for detecting colon cancer. It is needed only once every 10 years if results are negative. While sedated, a flexible, lighted tube with a tiny camera is inserted into the rectum and advanced through the colon to look for cancers. An alternative screening test that is used at home and returned to the lab may also be used. It detects hidden blood in bowel movements which could indicate cancer in the colon. If results are positive, you will need a colonoscopy to determine if the blood is a sign of cancer. This type of follow up (diagnostic) colonoscopy usually requires additional copays as required by your insurance provider. Please contact your PCP if you have any questions.              The following information is provided to all patients.  This information is to help you find resources for any of the problems found today that may be affecting your health:                Living healthy guide: [email or web address]       Understanding Diabetes:  www.diabetes.org       Eating healthy: www.cdc.gov/healthyweight      CDC home safety checklist: www.cdc.gov/steadi/patient.html      Agency on Aging: www.goea.[place].[facility]       Alcoholics anonymous (AA): www.aa.org      Physical Activity: www.gracie.nih.gov/ra7bkyz       Tobacco use: www.quitwithusla.org

## 2024-07-18 NOTE — PATIENT INSTRUCTIONS
Rivera Anders,     If you are due for any health screening(s) below please notify me so we can arrange them to be ordered and scheduled. Most healthy patients at your age complete them, but you are free to accept or refuse.     If you can't do it, I'll definitely understand. If you can, I'd certainly appreciate it!    Tests to Keep You Healthy    Mammogram: DUE  Eye Exam: Met on 11/25/2023  Colon Cancer Screening: DUE  Last Blood Pressure <= 139/89 (7/9/2024): NO      Schedule your breast cancer screening today     Breast cancer is the second most common cancer in women,  and the second leading cause of death from cancer. Mammograms can detect breast cancer early, which significantly increases the chances of curing the cancer.       Our records indicate that you may be overdue for breast cancer screening. Cancer screenings save lives, so schedule yours today to stay healthy.     If you recently had a mammogram performed outside of [facility], please let your Health care team know so that they can update your health record.        Its time for your colon cancer screening     Colorectal cancer is one of the leading causes of cancer death for men and women but it doesnt have to be. Screenings can prevent colorectal cancer or find it early enough to treat and cure the disease.     Our records indicate that you may be overdue for colon cancer screening. A colonoscopy or stool screening test can help identify patients at risk for developing colon cancer. Cancer screenings save lives, so schedule yours today to stay healthy.     A colonoscopy is the preferred test for detecting colon cancer. It is needed only once every 10 years if results are negative. While you are sedated, a flexible, lighted tube with a tiny camera is inserted into the rectum and advanced through the colon to look for cancers.     An alternative screening test that is used at home and returned to the lab may also be used. It detects hidden blood  in bowel movements which could indicate cancer in the colon. If results are positive, you will need a colonoscopy to determine if the blood is a sign of cancer. This type of follow up (diagnostic) colonoscopy usually requires additional copays as required by your insurance provider.     If you recently had your colon cancer screening performed outside of [facility], please let your Health care team know so that they can update your health record. Please contact your PCP if you have any questions.

## 2024-07-19 ENCOUNTER — PATIENT OUTREACH (OUTPATIENT)
Dept: ADMINISTRATIVE | Facility: HOSPITAL | Age: 59
End: 2024-07-19
Payer: MEDICARE

## 2024-07-19 RX ORDER — IPRATROPIUM BROMIDE AND ALBUTEROL SULFATE 2.5; .5 MG/3ML; MG/3ML
3 SOLUTION RESPIRATORY (INHALATION) EVERY 6 HOURS PRN
Qty: 360 ML | Refills: 0 | Status: SHIPPED | OUTPATIENT
Start: 2024-07-19 | End: 2025-07-19

## 2024-07-19 NOTE — TELEPHONE ENCOUNTER
Patient was last seen in the office on 7/09/24 w/ R DALI Trent.Patient is requesting a refill (Albuterol -ipratropium 2.5 mg )the request will be sent to provider for approval

## 2024-07-19 NOTE — PROGRESS NOTES
Population Health Chart Review & Patient Outreach Details      Additional [facility] Health Notes:               Updates Requested / Reviewed:      Updated Care Coordination Note, Care Everywhere, , and External Sources: [facility], [facility], and [facility]         Health Maintenance Topics Overdue:      VBHM Score: 3     Colon Cancer Screening  Mammogram  Hemoglobin A1c                       Health Maintenance Topic(s) Outreach Outcomes & Actions Taken:    Breast Cancer Screening - Outreach Outcomes & Actions Taken  : patient outreach    Colorectal Cancer Screening - Outreach Outcomes & Actions Taken  : External Records Requested & Care Team Updated if Applicable Dr. Schneider    Eye Exam - Outreach Outcomes & Actions Taken  : patient outreach

## 2024-07-19 NOTE — LETTER
"       AUTHORIZATION FOR RELEASE OF   CONFIDENTIAL INFORMATION    Dear Dr. Schneider,    We are seeing Martita San, date of birth 1965, in the clinic at [facility]. Amy Adam MD is the patient's PCP. Martita San has an outstanding lab/procedure at the time we reviewed her chart. In order to help keep her health information updated, she has authorized us to request the following medical record(s):             ( X )  COLONOSCOPY   2019 -  2024            Please fax records to [facility], Adams, Katherine Lee, MD, [phone]     If you have any questions, please contact [facility] at [phone]          Patient Name: Martita San  : 1965  Patient Phone #: [phone]           A. Consent for Examination and Treatment: I hereby authorize the providers and employees of [facility] to provide medical treatment/services which includes, but is not limited to, performing and administering tests and diagnostic procedures that are deemed necessary, Including, but not limited to, imaging examinations, blood tests and other laboratory procedures as may be required by the hospital, clinic, or may be ordered by my physician(s) or persons working under the general and/or special instructions of my physician(s).                   1.      I understand and agree that this consent covers all authorized persons, including but not limited to physicians, residents, nurse practitioners, physicians' assistants, specialists, consultants, student nurses, and independently contracted physicians, who are called upon by the physician in charge, to carry out the diagnostic procedures and medical or surgical treatment.  2.      I hereby authorize [facility] to retain or dispose of any specimens or tissue, should there be such remaining from any test or procedure.  3.      I hereby authorize and give consent for [facility] providers and employees to take photographs, images or " videotapes of such diagnostic, surgical or treatment procedures of Patient as may be required by Kristansprabhakar or as may be ordered by a physician.  I further acknowledge and agree that [facility] may use cameras or other devices for patient monitoring.  4.      I am aware that the practice of medicine is not an exact science, and I acknowledge that no guarantees have been made to me as to the outcome of any tests, procedures or treatment.                   B. Authorization for Release of Information:  I understand that my insurance company and/or their agents may need information necessary to make determinations about payment/reimbursement.  I hereby provide authorization to release to all insurance companies, their successors, assignees, other parties with whom they may have contracted, or others acting on their behalf, that are involved with payment for any hospital and/or clinic charges incurred by the patient, any information that they request and deem necessary for payment/reimbursement, and/or quality review.  I further authorize the release of my health information to physicians or other health care practitioners on staff who are involved in my health care now and in the future, and to other health care providers, entities, or institutions for the purpose of my continued care and treatment, including referrals.     C. Medicare Patient's Certification and Authorization to Release Information and Payment Request:  I certify that the information given by me in applying for payment under Title XVIII of the Social Security Act is correct.  I authorize any rosado of medical or other information about me to release to the Social Security Administration, or its intermediaries or carriers, any information needed for this or a related Medicare claim.  I request that payment of authorized benefits be made on my behalf.     D. Assignment of Insurance Benefits:  I hereby authorize any and all insurance companies, health plans,  defined benefit plans, health insurers or any entity that is or may be responsible for payment of my medical expenses to pay all hospital and medical benefits now due, and to become due and payable to me under any hospital benefits, sick benefits, injury benefits or any other benefit for services rendered to me, including Major Medical Benefits, direct to [facility] and all independently contracted physicians.  I assign any and all rights that I may have against any and all insurance companies, health plans, defined benefit plans, health insurers or any entity that is or may be responsible for payment of my medical expenses, including, but not limited to any right to appeal a denial of a claim, any right to bring any action, lawsuit, administrative proceeding, or other cause of action on my behalf.  I specifically assign my right to pursue litigation against any and all insurance companies, health plans, defined benefit plans, health insurers or any entity that is or may be responsible for payment of medical expenses based upon a refusal to pay charges.              REGISTRATION  AUTHORIZATION                    E. Valuables:  It is understood and agreed that [facility] is not liable for the damage to or loss of any money, jewelry, documents, dentures, eye glasses, hearing aids, prosthetics, or other property of value.     F. Computer Equipment:  I understand and agree that should I choose to use computer equipment owned by [facility] or if I choose to access the Internet via [facility]'s network, I do so at my own risk.  [facility] is not responsible for any damage to my computer equipment or to any damages of any type that might arise from my loss of equipment or data.                   G. Acceptance of Financial Responsibility:  I agree that in consideration of the services and supplies that have been or will be furnished to the patient,  I am hereby obligated to pay all charges made for or on the account of the patient  according to the standard rates (in effect at the time the services and supplies are delivered) established by [facility], including its Patient Financial Assistance Policy to the extent it is applicable.  I understand that I am responsible for all charges, or portions thereof, not covered by insurance or other sources.  Patient refunds will be distributed only after balances at all [facility] facilities are paid.                   H. Communication Authorization:  I hereby authorize [facility] and its representatives, along with any billing service or  who may work on their behalf, to contact me on my cell phone and/or home phone using prerecorded messages, artificial voice messages, automatic telephone dialing devices or other computer assisted technology, or by electronic mail, text messaging, or by any other form of electronic communication.  This includes, but is not limited to appointment reminders, yearly physical exam reminders, preventive care reminders, patient campaigns, welcome calls, and calls about account balances on my account or any account on which I am listed as a guarantor.  I understand I have the right to opt out of these communications at any time.     I.  Relationship Between Facility and Physician:  I understand that some, but not all, providers furnishing services to the patient are not employees or agents of [facility].  The patient is under the care and supervision of his/her attending physician, and it is the responsibility of the facility and its nursing staff to carry out the instructions of such physicians.  It is the responsibility of the patient's physician/designee to obtain the patient's informed consent, when required, for medical or surgical treatment, special diagnostic or therapeutic procedures, or hospital services rendered for the patient under the special instructions of the physician/designee.     J. Notice of Private Practices:  I acknowledge I have received a copy of  [facility]'s Notice of Privacy Practices.     K. Facility Directory:  I have discussed with the organization my desire to be either included or excluded in the facility directory.  I understand that if my choice is to opt-out of being identified in the facility directory that the facility will not provide any information about me such as my condition (e.g. Fair, stable, etc.) or my location in the facility (e.g. Room number, department).     L. LINKS:  For [place] Residents:  [facility] is a LINKS ([place] Immunization [facility]) participating facility.  [facility] is a [facility]-sponsored confidential computer system that helps you and your doctor keep track of you and your child's immunization history.  I acknowledge that I am allowing [facility] to share this information with [facility].  For [place] Residents:  [facility] is a MIIX ([place] Immunization Information eXchange) participant.  [employer] is a [place] Department of Health-sponsored confidential computer system that helps you and your doctor keep track of you and your child's immunization history.  I acknowledge that I am allowing [facility] to share information with [employer].     M. Term:  This authorization is valid for this and subsequent care/treatment I receive at [facility] and will remain valid unless/until revoked in writing by me.      ACKNOWLEDGMENT OF POTENTIAL RISKS OF COVID-19 VACCINE  It is important that you, as the patient or patients legally authorized representative(s), understand and acknowledge the following, with regard to administration of the COVID-19 vaccine offered by [facility]:  The SARS-CoV-2 virus (COVID-19) has caused an unprecedented modern global pandemic that has mobilized scientists and drug manufacturers to work to create safe and effective vaccines to get the crisis under control.  No vaccine is released in the [place] without undergoing rigorous, multi-layered testing and approval by the Food and Drug  Administration.  During a public health emergency, however, vaccines can be released for patient administration by the FDA prior to completion of multi-phase clinical trials and approval. This is done by the FDAs granting of Emergency Use Authorization (EUA) when the vaccine meets reasonable thresholds for safety and effectiveness and people are in urgent need of care. Under an EUA, the FDA has found that known and potential benefits outweigh its known and potential risks.  The vaccine for which you are presenting to [facility] has been released under an EUA, which [facility] is honoring in its distribution of the vaccine to the public. While the FDAs authorization indicates its belief that usage is recommended over possible risks, there is still the possibility that unknown risks of the vaccine could exist.  By signing this document, you acknowledge and assume these risks. Further, you waive any and all claims of liability against and hold harmless any [facility] entity or provider for any harm caused to you by said possible unknown risks of the vaccine.        N. [facility]:  As used in this document, [facility] means all [facility] affiliated entities including all health centers, surgery centers, clinics, and hospitals.  It includes more specifically, the following entities: [facility], a not for profit [facility], and its subsidiaries and affiliates, including [facility], [facility], [facility], L[facility], [facility], [facility], [facility], [facility]/b/[facility], [facility].d/b/a [facility] [employer] [employer] As manager of Sunday CHAUDHARI [facility], [facility], [facility], L.L.C.d/b/a [facility], [facility], [facility] and [facility] as manager of [facility].                                                                Patient/Legal Guardian Signature                                                    This signature was collected at 05/14/2024      /                                                                            Printed Name/Relationship to Patient                                                [facility] complies with applicable Federal civil rights laws and does not discriminate on the basis of race, color, national origin, age, disability, or sex.          ATENCIÓN: si habla español, tiene a dietz disposición servicios gratuitos de asistencia lingüística. Tracy grubbs [phone].         CHÚ Ý: N?u b?n nói Ti?ng Vi?t, có các d?ch v? h? tr? ngôn ng? mi?n phí dành cho b?n. G?i s? [phone].              REGISTRATION  AUTHORIZATION

## 2024-07-22 NOTE — TELEPHONE ENCOUNTER
No care due was identified.  [facility] Embedded Care Due Messages. Reference number: [id number].   7/22/2024 11:56:50 AM CDT

## 2024-07-22 NOTE — TELEPHONE ENCOUNTER
----- Message from Sampson Tillman sent at 7/22/2024 10:52 AM CDT -----  Regarding: refill  Contact: patient  Type:  RX Refill Request    Who Called:  patient   Refill or New Rx:  refill  RX Name and Strength:  albuterol (PROVENTIL/VENTOLIN HFA) 90 mcg/actuation inhaler 6.7 g 0 5/15/2021 7/18/2024   Sig - Route: Inhale 1-2 puffs into the lungs every 6 (six) hours as needed for Wheezing. Rescue - Inhalation   Class: Print       How is the patient currently taking it? (ex. 1XDay):  see above   Is this a 30 day or 90 day RX:  see above   Preferred Pharmacy with phone number:    [facility]  [place], [place] - [address] [address]  [address] [address]  Phone: [phone] Fax: [phone]    Local or Mail Order:  local  Ordering Provider:  Carrie Martínez NP  Best Call Back Number:  [phone]  Additional Information:  thanks

## 2024-07-23 DIAGNOSIS — J44.9 CHRONIC OBSTRUCTIVE PULMONARY DISEASE, UNSPECIFIED COPD TYPE: Primary | ICD-10-CM

## 2024-07-23 DIAGNOSIS — J44.9 CHRONIC OBSTRUCTIVE PULMONARY DISEASE, UNSPECIFIED COPD TYPE: ICD-10-CM

## 2024-07-23 RX ORDER — ALBUTEROL SULFATE 90 UG/1
1-2 AEROSOL, METERED RESPIRATORY (INHALATION) EVERY 6 HOURS PRN
Qty: 6.7 G | Refills: 0 | Status: SHIPPED | OUTPATIENT
Start: 2024-07-23 | End: 2024-07-23 | Stop reason: SDUPTHER

## 2024-07-23 RX ORDER — ALBUTEROL SULFATE 90 UG/1
1-2 AEROSOL, METERED RESPIRATORY (INHALATION) EVERY 6 HOURS PRN
Qty: 6.7 G | Refills: 0 | Status: SHIPPED | OUTPATIENT
Start: 2024-07-23 | End: 2025-07-23

## 2024-07-26 ENCOUNTER — HOSPITAL ENCOUNTER (OUTPATIENT)
Dept: RADIOLOGY | Facility: HOSPITAL | Age: 59
Discharge: HOME | End: 2024-07-26
Attending: STUDENT IN AN ORGANIZED HEALTH CARE EDUCATION/TRAINING PROGRAM
Payer: MEDICARE

## 2024-07-26 ENCOUNTER — PATIENT OUTREACH (OUTPATIENT)
Dept: ADMINISTRATIVE | Facility: HOSPITAL | Age: 59
End: 2024-07-26
Payer: MEDICARE

## 2024-07-26 DIAGNOSIS — Z12.12 ENCOUNTER FOR SCREENING FOR COLORECTAL MALIGNANT NEOPLASM: Primary | ICD-10-CM

## 2024-07-26 DIAGNOSIS — Z12.11 ENCOUNTER FOR SCREENING FOR COLORECTAL MALIGNANT NEOPLASM: Primary | ICD-10-CM

## 2024-07-26 DIAGNOSIS — Z12.31 ENCOUNTER FOR SCREENING MAMMOGRAM FOR MALIGNANT NEOPLASM OF BREAST: ICD-10-CM

## 2024-07-26 PROCEDURE — 77067 SCR MAMMO BI INCL CAD: CPT | Mod: TC,PO

## 2024-07-26 PROCEDURE — 77067 SCR MAMMO BI INCL CAD: CPT | Mod: 26,,, | Performed by: RADIOLOGY

## 2024-07-26 PROCEDURE — 77063 BREAST TOMOSYNTHESIS BI: CPT | Mod: 26,,, | Performed by: RADIOLOGY

## 2024-07-26 PROCEDURE — 77063 BREAST TOMOSYNTHESIS BI: CPT | Mod: TC,PO

## 2024-07-26 NOTE — PROGRESS NOTES
"Population Health Chart Review & Patient Outreach Details      Additional [facility] Health Notes:    Attempted to outreach patient regarding overdue/ due HM via [facility] No response after a week. Now sending outreach via mail out letter.           Updates Requested / Reviewed:      Care Everywhere         Health Maintenance Topics Overdue:      VBHM Score: 2     Colon Cancer Screening  Hemoglobin A1c                       Health Maintenance Topic(s) Outreach Outcomes & Actions Taken:    Eye Exam - Outreach Outcomes & Actions Taken  :      Breast Cancer Screening - Outreach Outcomes & Actions Taken  :    "

## 2024-07-26 NOTE — PROGRESS NOTES
Population Health Chart Review & Patient Outreach Details      Additional [facility] Health Notes:               Updates Requested / Reviewed:      Updated Care Coordination Note, Care Everywhere, , and Immunizations Reconciliation Completed or Queried: [facility] Topics Overdue:      [facility] Score: 2     Colon Cancer Screening  Hemoglobin A1c                       Health Maintenance Topic(s) Outreach Outcomes & Actions Taken:    Colorectal Cancer Screening - Outreach Outcomes & Actions Taken  : External Records Uploaded, Care Team Updated, & History Updated if Applicable

## 2024-07-26 NOTE — LETTER
July 26, 2024    Martita San  [address] [address]             [place] [id number]  [facility] [address]  Phone: [phone] Dear Jair Cash Katherine Lee, MD has reviewed your records and found that you are overdue for your Mammogram.  A Mammogram order have been placed for you. Amy Adam MD has requested you schedule your Mammogram and encourages you to schedule at any of the [facility] locations by calling [phone].      We do not have a copy of a recent Eye Exam for you. It looks like you may have had an Eye Exam in November 2023 at an outside facility, please notify us where and we will request a copy of the exam and update your chart.      Please don't hesitate to notify us if we can help you with anything further at this time. Have a good day!     Sincerely,      JUAN Grullon [facility]  Phone: [phone]  FAX: [phone]

## 2024-07-30 ENCOUNTER — PATIENT MESSAGE (OUTPATIENT)
Dept: GASTROENTEROLOGY | Facility: CLINIC | Age: 59
End: 2024-07-30
Payer: MEDICARE

## 2024-07-31 ENCOUNTER — PATIENT MESSAGE (OUTPATIENT)
Dept: ADMINISTRATIVE | Facility: HOSPITAL | Age: 59
End: 2024-07-31
Payer: MEDICARE

## 2024-08-05 ENCOUNTER — PATIENT MESSAGE (OUTPATIENT)
Dept: FAMILY MEDICINE | Facility: CLINIC | Age: 59
End: 2024-08-05
Payer: MEDICARE

## 2024-08-19 RX ORDER — NEBIVOLOL 10 MG/1
10 TABLET ORAL DAILY
Qty: 30 TABLET | Refills: 11 | Status: SHIPPED | OUTPATIENT
Start: 2024-08-19 | End: 2024-08-21 | Stop reason: SDUPTHER

## 2024-08-20 ENCOUNTER — PATIENT MESSAGE (OUTPATIENT)
Dept: CARDIOLOGY | Facility: CLINIC | Age: 59
End: 2024-08-20
Payer: MEDICARE

## 2024-08-20 DIAGNOSIS — J44.9 CHRONIC OBSTRUCTIVE PULMONARY DISEASE, UNSPECIFIED COPD TYPE: ICD-10-CM

## 2024-08-21 RX ORDER — ALBUTEROL SULFATE 90 UG/1
INHALANT RESPIRATORY (INHALATION)
Qty: 21 G | Refills: 3 | Status: SHIPPED | OUTPATIENT
Start: 2024-08-21

## 2024-08-21 RX ORDER — NEBIVOLOL 10 MG/1
10 TABLET ORAL 2 TIMES DAILY
Qty: 60 TABLET | Refills: 11 | Status: SHIPPED | OUTPATIENT
Start: 2024-08-21 | End: 2025-08-21

## 2024-08-21 NOTE — TELEPHONE ENCOUNTER
No care due was identified.  [facility] Embedded Care Due Messages. Reference number: [id number].   8/20/2024 7:06:19 PM CDT

## 2024-09-16 NOTE — TELEPHONE ENCOUNTER
Refill request for omeprazole 40 mg capsule sent to [facility], [place] ([address]).    Last office visit was 05/14/2024.    JUAN Roa 09/16/2024

## 2024-09-20 RX ORDER — OMEPRAZOLE 40 MG/1
CAPSULE, DELAYED RELEASE ORAL
Qty: 60 CAPSULE | Refills: 0 | Status: SHIPPED | OUTPATIENT
Start: 2024-09-20

## 2024-10-03 ENCOUNTER — LAB VISIT (OUTPATIENT)
Dept: LAB | Facility: HOSPITAL | Age: 59
End: 2024-10-03
Attending: INTERNAL MEDICINE
Payer: MEDICARE

## 2024-10-03 DIAGNOSIS — N18.31 CHRONIC KIDNEY DISEASE, STAGE 3A: ICD-10-CM

## 2024-10-03 DIAGNOSIS — N18.31 CHRONIC KIDNEY DISEASE, STAGE 3A: Primary | ICD-10-CM

## 2024-10-03 LAB
ALBUMIN SERPL BCP-MCNC: 3.6 G/DL (ref 3.5–5.2)
ANION GAP SERPL CALC-SCNC: 8 MMOL/L (ref 8–16)
BASOPHILS # BLD AUTO: 0.07 K/UL (ref 0–0.2)
BASOPHILS NFR BLD: 1.1 % (ref 0–1.9)
BUN SERPL-MCNC: 24 MG/DL (ref 6–20)
CALCIUM SERPL-MCNC: 9.3 MG/DL (ref 8.7–10.5)
CHLORIDE SERPL-SCNC: 106 MMOL/L (ref 95–110)
CO2 SERPL-SCNC: 26 MMOL/L (ref 23–29)
CREAT SERPL-MCNC: 1 MG/DL (ref 0.5–1.4)
DIFFERENTIAL METHOD BLD: ABNORMAL
EOSINOPHIL # BLD AUTO: 0.7 K/UL (ref 0–0.5)
EOSINOPHIL NFR BLD: 11.5 % (ref 0–8)
ERYTHROCYTE [DISTWIDTH] IN BLOOD BY AUTOMATED COUNT: 13.4 % (ref 11.5–14.5)
EST. GFR  (NO RACE VARIABLE): >60 ML/MIN/1.73 M^2
GLUCOSE SERPL-MCNC: 70 MG/DL (ref 70–110)
HCT VFR BLD AUTO: 41.9 % (ref 37–48.5)
HGB BLD-MCNC: 13.5 G/DL (ref 12–16)
IMM GRANULOCYTES # BLD AUTO: 0.02 K/UL (ref 0–0.04)
IMM GRANULOCYTES NFR BLD AUTO: 0.3 % (ref 0–0.5)
LYMPHOCYTES # BLD AUTO: 1.4 K/UL (ref 1–4.8)
LYMPHOCYTES NFR BLD: 21.6 % (ref 18–48)
MCH RBC QN AUTO: 30.2 PG (ref 27–31)
MCHC RBC AUTO-ENTMCNC: 32.2 G/DL (ref 32–36)
MCV RBC AUTO: 94 FL (ref 82–98)
MONOCYTES # BLD AUTO: 0.5 K/UL (ref 0.3–1)
MONOCYTES NFR BLD: 8.5 % (ref 4–15)
NEUTROPHILS # BLD AUTO: 3.6 K/UL (ref 1.8–7.7)
NEUTROPHILS NFR BLD: 57 % (ref 38–73)
NRBC BLD-RTO: 0 /100 WBC
PHOSPHATE SERPL-MCNC: 4.4 MG/DL (ref 2.7–4.5)
PLATELET # BLD AUTO: 175 K/UL (ref 150–450)
PMV BLD AUTO: 10.8 FL (ref 9.2–12.9)
POTASSIUM SERPL-SCNC: 4.2 MMOL/L (ref 3.5–5.1)
RBC # BLD AUTO: 4.47 M/UL (ref 4–5.4)
SODIUM SERPL-SCNC: 140 MMOL/L (ref 136–145)
WBC # BLD AUTO: 6.25 K/UL (ref 3.9–12.7)

## 2024-10-03 PROCEDURE — 85025 COMPLETE CBC W/AUTO DIFF WBC: CPT | Performed by: INTERNAL MEDICINE

## 2024-10-03 PROCEDURE — 80069 RENAL FUNCTION PANEL: CPT | Performed by: INTERNAL MEDICINE

## 2024-10-03 PROCEDURE — 36415 COLL VENOUS BLD VENIPUNCTURE: CPT | Performed by: INTERNAL MEDICINE

## 2024-10-18 RX ORDER — OMEPRAZOLE 40 MG/1
CAPSULE, DELAYED RELEASE ORAL
Qty: 60 CAPSULE | Refills: 0 | Status: SHIPPED | OUTPATIENT
Start: 2024-10-18

## 2024-10-29 ENCOUNTER — TELEPHONE (OUTPATIENT)
Dept: NEPHROLOGY | Facility: CLINIC | Age: 59
End: 2024-10-29
Payer: MEDICARE

## 2024-10-29 DIAGNOSIS — J44.9 CHRONIC OBSTRUCTIVE PULMONARY DISEASE, UNSPECIFIED COPD TYPE: ICD-10-CM

## 2024-10-29 RX ORDER — ALBUTEROL SULFATE 90 UG/1
INHALANT RESPIRATORY (INHALATION)
Qty: 21 G | Refills: 2 | Status: SHIPPED | OUTPATIENT
Start: 2024-10-29

## 2024-10-30 ENCOUNTER — OFFICE VISIT (OUTPATIENT)
Dept: NEPHROLOGY | Facility: CLINIC | Age: 59
End: 2024-10-30
Payer: MEDICARE

## 2024-10-30 ENCOUNTER — LAB VISIT (OUTPATIENT)
Dept: LAB | Facility: HOSPITAL | Age: 59
End: 2024-10-30
Attending: INTERNAL MEDICINE
Payer: MEDICARE

## 2024-10-30 VITALS
OXYGEN SATURATION: 98 % | DIASTOLIC BLOOD PRESSURE: 87 MMHG | HEART RATE: 61 BPM | SYSTOLIC BLOOD PRESSURE: 119 MMHG | BODY MASS INDEX: 30.45 KG/M2 | WEIGHT: 194 LBS | HEIGHT: 67 IN

## 2024-10-30 DIAGNOSIS — R79.89 ELEVATED SERUM CREATININE: ICD-10-CM

## 2024-10-30 DIAGNOSIS — N18.31 CHRONIC KIDNEY DISEASE, STAGE 3A: ICD-10-CM

## 2024-10-30 DIAGNOSIS — I10 HYPERTENSION, UNSPECIFIED TYPE: Primary | ICD-10-CM

## 2024-10-30 LAB
BILIRUB UR QL STRIP: NEGATIVE
CLARITY UR: CLEAR
COLOR UR: YELLOW
GLUCOSE UR QL STRIP: NEGATIVE
HGB UR QL STRIP: NEGATIVE
KETONES UR QL STRIP: NEGATIVE
LEUKOCYTE ESTERASE UR QL STRIP: NEGATIVE
NITRITE UR QL STRIP: NEGATIVE
PH UR STRIP: 6 [PH] (ref 5–8)
PROT UR QL STRIP: NEGATIVE
SP GR UR STRIP: 1.02 (ref 1–1.03)
URN SPEC COLLECT METH UR: NORMAL
UROBILINOGEN UR STRIP-ACNC: NEGATIVE EU/DL

## 2024-10-30 PROCEDURE — 99204 OFFICE O/P NEW MOD 45 MIN: CPT | Mod: S$GLB,,, | Performed by: INTERNAL MEDICINE

## 2024-10-30 PROCEDURE — 4010F ACE/ARB THERAPY RXD/TAKEN: CPT | Mod: CPTII,S$GLB,, | Performed by: INTERNAL MEDICINE

## 2024-10-30 PROCEDURE — 3008F BODY MASS INDEX DOCD: CPT | Mod: CPTII,S$GLB,, | Performed by: INTERNAL MEDICINE

## 2024-10-30 PROCEDURE — 81003 URINALYSIS AUTO W/O SCOPE: CPT | Performed by: INTERNAL MEDICINE

## 2024-10-30 PROCEDURE — 3074F SYST BP LT 130 MM HG: CPT | Mod: CPTII,S$GLB,, | Performed by: INTERNAL MEDICINE

## 2024-10-30 PROCEDURE — 1159F MED LIST DOCD IN RCRD: CPT | Mod: CPTII,S$GLB,, | Performed by: INTERNAL MEDICINE

## 2024-10-30 PROCEDURE — 3066F NEPHROPATHY DOC TX: CPT | Mod: CPTII,S$GLB,, | Performed by: INTERNAL MEDICINE

## 2024-10-30 PROCEDURE — 99999 PR PBB SHADOW E&M-EST. PATIENT-LVL III: CPT | Mod: PBBFAC,,, | Performed by: INTERNAL MEDICINE

## 2024-10-30 PROCEDURE — 3079F DIAST BP 80-89 MM HG: CPT | Mod: CPTII,S$GLB,, | Performed by: INTERNAL MEDICINE

## 2024-11-06 ENCOUNTER — OFFICE VISIT (OUTPATIENT)
Dept: FAMILY MEDICINE | Facility: CLINIC | Age: 59
End: 2024-11-06
Payer: MEDICARE

## 2024-11-06 VITALS
HEART RATE: 73 BPM | DIASTOLIC BLOOD PRESSURE: 88 MMHG | SYSTOLIC BLOOD PRESSURE: 124 MMHG | TEMPERATURE: 98 F | WEIGHT: 191.13 LBS | OXYGEN SATURATION: 98 % | BODY MASS INDEX: 30 KG/M2 | HEIGHT: 67 IN

## 2024-11-06 DIAGNOSIS — I10 ESSENTIAL HYPERTENSION: Primary | ICD-10-CM

## 2024-11-06 DIAGNOSIS — J44.9 CHRONIC OBSTRUCTIVE PULMONARY DISEASE, UNSPECIFIED COPD TYPE: ICD-10-CM

## 2024-11-06 DIAGNOSIS — N18.31 CHRONIC KIDNEY DISEASE, STAGE 3A: ICD-10-CM

## 2024-11-06 PROCEDURE — 3074F SYST BP LT 130 MM HG: CPT | Mod: CPTII,S$GLB,, | Performed by: STUDENT IN AN ORGANIZED HEALTH CARE EDUCATION/TRAINING PROGRAM

## 2024-11-06 PROCEDURE — 1160F RVW MEDS BY RX/DR IN RCRD: CPT | Mod: CPTII,S$GLB,, | Performed by: STUDENT IN AN ORGANIZED HEALTH CARE EDUCATION/TRAINING PROGRAM

## 2024-11-06 PROCEDURE — 4010F ACE/ARB THERAPY RXD/TAKEN: CPT | Mod: CPTII,S$GLB,, | Performed by: STUDENT IN AN ORGANIZED HEALTH CARE EDUCATION/TRAINING PROGRAM

## 2024-11-06 PROCEDURE — 3066F NEPHROPATHY DOC TX: CPT | Mod: CPTII,S$GLB,, | Performed by: STUDENT IN AN ORGANIZED HEALTH CARE EDUCATION/TRAINING PROGRAM

## 2024-11-06 PROCEDURE — 3008F BODY MASS INDEX DOCD: CPT | Mod: CPTII,S$GLB,, | Performed by: STUDENT IN AN ORGANIZED HEALTH CARE EDUCATION/TRAINING PROGRAM

## 2024-11-06 PROCEDURE — G2211 COMPLEX E/M VISIT ADD ON: HCPCS | Mod: S$GLB,,, | Performed by: STUDENT IN AN ORGANIZED HEALTH CARE EDUCATION/TRAINING PROGRAM

## 2024-11-06 PROCEDURE — 99999 PR PBB SHADOW E&M-EST. PATIENT-LVL III: CPT | Mod: PBBFAC,,, | Performed by: STUDENT IN AN ORGANIZED HEALTH CARE EDUCATION/TRAINING PROGRAM

## 2024-11-06 PROCEDURE — 3079F DIAST BP 80-89 MM HG: CPT | Mod: CPTII,S$GLB,, | Performed by: STUDENT IN AN ORGANIZED HEALTH CARE EDUCATION/TRAINING PROGRAM

## 2024-11-06 PROCEDURE — 99213 OFFICE O/P EST LOW 20 MIN: CPT | Mod: S$GLB,,, | Performed by: STUDENT IN AN ORGANIZED HEALTH CARE EDUCATION/TRAINING PROGRAM

## 2024-11-06 PROCEDURE — 1159F MED LIST DOCD IN RCRD: CPT | Mod: CPTII,S$GLB,, | Performed by: STUDENT IN AN ORGANIZED HEALTH CARE EDUCATION/TRAINING PROGRAM

## 2024-11-06 RX ORDER — NYSTATIN 100000 [USP'U]/G
POWDER TOPICAL 2 TIMES DAILY
Qty: 60 G | Refills: 0 | Status: SHIPPED | OUTPATIENT
Start: 2024-11-06

## 2024-11-06 RX ORDER — ALBUTEROL SULFATE 90 UG/1
1-2 INHALANT RESPIRATORY (INHALATION) EVERY 6 HOURS PRN
Qty: 21 G | Refills: 2 | Status: SHIPPED | OUTPATIENT
Start: 2024-11-06

## 2024-11-06 NOTE — PROGRESS NOTES
[facility] Note    Subjective:  Chief Complaint:   Chief Complaint   Patient presents with    Follow-up       History of Present Illness:  Maritta is here for routine follow up   Feels well today     HTN  hydralazine 25 mg tid  - d/c per self  Amlodipine 10 daily   Clonidine 0.1 - prn for high bp   Furosemide 20 daily - d/c per self  Lisinopril 40 daily   Nebivolol 10 daily - decreased bid to daily , headaches resolved  Eliquis 5  Managed with Cards   Reports returning to plant based diet   Home checks: 120/80s    CKD3a  GFR 52.5 and 52.1 at one year and six months prior  Pt self referred to Nephrology, repeat GFR wnl       Allergies:  Review of patient's allergies indicates:   Allergen Reactions    Clindamycin Hives    Pcn [penicillins] Anaphylaxis    Diphenhydramine hcl Hives    Influenza virus vaccines     Oxycodone     Adhesive Rash    Metoprolol Rash       Home Medications:  Current Outpatient Medications on File Prior to Visit   Medication Sig    albuterol-ipratropium (DUO-NEB) 2.5 mg-0.5 mg/3 mL nebulizer solution Take 3 mLs by nebulization every 6 (six) hours as needed for Wheezing or Shortness of Breath. Rescue    amLODIPine (NORVASC) 10 MG tablet Take 1 tablet (10 mg total) by mouth once daily.    apixaban (ELIQUIS) 5 mg Tab Take 1 tablet (5 mg total) by mouth 2 (two) times daily.    azelastine (ASTELIN) 137 mcg (0.1 %) nasal spray 2 sprays into each side of nose before bed every night, may use in AM also    cloNIDine (CATAPRES) 0.1 MG tablet Take 0.1 mg by mouth 3 (three) times daily.    fluticasone propionate (FLONASE) 50 mcg/actuation nasal spray 2 sprays (100 mcg total) by Each Nostril route every evening. 2 sprays into each nostril before bed every night    lisinopriL (PRINIVIL,ZESTRIL) 40 MG tablet Take 1 tablet (40 mg total) by mouth once daily.    nebivoloL (BYSTOLIC) 10 MG Tab Take 1 tablet (10 mg total) by mouth 2 (two) times a day. (Patient taking differently: Take 10 mg by mouth  once daily.)    omeprazole (PRILOSEC) 40 MG capsule TAKE 1 CAPSULE BY MOUTH TWICE DAILY BEFORE MEAL(S)    [DISCONTINUED] albuterol (PROVENTIL/VENTOLIN HFA) 90 mcg/actuation inhaler INHALE 1 TO 2 PUFFS INTO THE LUNGS EVERY 6 HOURS AS NEEDED FOR WHEEZING    [DISCONTINUED] NYSTOP powder     [DISCONTINUED] ciclopirox (PENLAC) 8 % Soln  (Patient not taking: Reported on 2024)    [DISCONTINUED] furosemide (LASIX) 20 MG tablet Take 1 tablet (20 mg total) by mouth once daily. (Patient not taking: Reported on 2024)    [DISCONTINUED] hydrALAZINE (APRESOLINE) 25 MG tablet Take 1 tablet (25 mg total) by mouth 3 (three) times daily. (Patient not taking: Reported on 2024)    [DISCONTINUED] ibuprofen (ADVIL,MOTRIN) 600 MG tablet Take 1 tablet (600 mg total) by mouth every 6 (six) hours as needed. (Patient not taking: Reported on 2024)    [DISCONTINUED] triamcinolone acetonide 0.1% (KENALOG) 0.1 % cream  (Patient not taking: Reported on 2024)     No current facility-administered medications on file prior to visit.       Past Medical History:   Diagnosis Date    Allergy     Anxiety     Carotid stenosis 2016    Chronic anemia     COPD (chronic obstructive pulmonary disease)     Depression     Fibromyalgia     Osteoarthritis     Pinched nerve     PTSD (post-traumatic stress disorder)     Tachycardia     Thyroid disease     Torn rotator cuff     Uterine fibroid 10/2016    Vertigo      Past Surgical History:   Procedure Laterality Date     SECTION      HYSTERECTOMY      LAMINECTOMY      TONSILLECTOMY       Family History   Problem Relation Name Age of Onset    Arthritis Mother      Hypertension Mother      COPD Mother      Depression Mother      Arthritis Father      Depression Father      Hypertension Father      Heart disease Father      COPD Father      Heart attack Father      Asthma Father      Heart attack Maternal Grandmother      Cancer Maternal Grandmother      Hypertension Maternal  "Grandmother      Hypertension Paternal Grandmother      Stroke Paternal Grandmother      Tuberculosis Paternal Grandfather       Social History     Tobacco Use    Smoking status: Never    Smokeless tobacco: Never   Substance Use Topics    Alcohol use: No    Drug use: Yes     Types: Marijuana            The patient's past medical history, surgical history, social history, family history, allergies and medications have been reviewed.    Review of Systems     10 point review of systems was conducted and only the pertinent positives and pertinent negatives are noted above in the HPI section.    Physical Examination  General appearance: alert, cooperative, no distress  HEENT: normocephalic, atraumatic, PERRLA   Neck: trachea midline,  no neck stiffness  Lungs: normal effort   Skin: No rashes or abnormal skin lesions, no apparent jaundice or bruising  Extremities: Full ROM of all extremities   Neurological:alert, oriented, normal speech, no new focal findings or movement disorder noted from baseline      BP Readings from Last 3 Encounters:   11/06/24 124/88   10/30/24 119/87   07/18/24 124/72     Wt Readings from Last 3 Encounters:   11/06/24 86.7 kg (191 lb 2.2 oz)   10/30/24 88 kg (194 lb 0.1 oz)   07/18/24 88.5 kg (195 lb 1.7 oz)     /88 (BP Location: Left arm, Patient Position: Sitting)   Pulse 73   Temp 98.2 °F (36.8 °C) (Oral)   Ht 5' 7" (1.702 m)   Wt 86.7 kg (191 lb 2.2 oz)   SpO2 98%   BMI 29.94 kg/m²    274}  Laboratory: I have reviewed old labs below:    274}    Lab Results   Component Value Date    WBC 6.25 10/03/2024    HGB 13.5 10/03/2024    HCT 41.9 10/03/2024    MCV 94 10/03/2024     10/03/2024     10/03/2024    K 4.2 10/03/2024     10/03/2024    CALCIUM 9.3 10/03/2024    PHOS 4.4 10/03/2024    CO2 26 10/03/2024    GLU 70 10/03/2024    BUN 24 (H) 10/03/2024    CREATININE 1.0 10/03/2024    EGFRNORACEVR >60.0 10/03/2024    ANIONGAP 8 10/03/2024    PROT 6.5 05/09/2024    ALBUMIN " 3.6 10/03/2024    BILITOT 0.4 05/09/2024    ALKPHOS 64 05/09/2024    ALT 12 05/09/2024    AST 13 05/09/2024    CHOL 207 (H) 01/28/2020    TRIG 137 01/28/2020    HDL 54 01/28/2020    LDLCALC 125.6 01/28/2020    TSH 1.446 12/27/2016      Lab reviewed by me: Particular labs of significance that I will monitor, workup, or treat to improve are mentioned below in diagnostic impression remarks.    Imaging/EKG: I have reviewed the pertinent results and my findings are noted in remarks.  274}    CC:   Chief Complaint   Patient presents with    Follow-up        274}    Assessment/Plan  Martita San is a 58 y.o. female who presents to clinic with:  1. Essential hypertension    2. Chronic kidney disease, stage 3a    3. Chronic obstructive pulmonary disease, unspecified COPD type       274}  Diagnostic Impression Remarks       58 y.o. female who presents to clinic today for follow up     This is the extent of this pleasant patient's concerns at this present time.  I also discussed the importance of close follow up to discuss labs, change or modify her medications if needed, monitor side effects, and further evaluation of medical problems.     Additional workup planned: see labs ordered below.    See below for labs and meds ordered with associated diagnosis      1. Essential hypertension  Stable. Within goal 120/80  Continue current regimen   Monitor at home. Return precautions provided    2. Chronic kidney disease, stage 3a  Resolved. GFR >60 last labs   Avoid nephrotoxic agents    3. Chronic obstructive pulmonary disease, unspecified COPD type  - albuterol (PROVENTIL/VENTOLIN HFA) 90 mcg/actuation inhaler; Inhale 1-2 puffs into the lungs every 6 (six) hours as needed for Wheezing. Rescue  Dispense: 21 g; Refill: 2  - NEBULIZER FOR HOME USE          RTC  prn     Amy Adam MD, [facility]   Family Medicine Physician  11/06/2024

## 2024-11-22 DIAGNOSIS — N18.31 CHRONIC KIDNEY DISEASE, STAGE 3A: ICD-10-CM

## 2024-12-06 ENCOUNTER — HOSPITAL ENCOUNTER (OUTPATIENT)
Facility: HOSPITAL | Age: 59
Discharge: HOME | End: 2024-12-07
Attending: EMERGENCY MEDICINE | Admitting: INTERNAL MEDICINE
Payer: MEDICARE

## 2024-12-06 DIAGNOSIS — G45.9 TIA (TRANSIENT ISCHEMIC ATTACK): ICD-10-CM

## 2024-12-06 DIAGNOSIS — R29.818 ACUTE FOCAL NEUROLOGICAL DEFICIT: ICD-10-CM

## 2024-12-06 LAB
ALBUMIN SERPL BCP-MCNC: 4.2 G/DL (ref 3.5–5.2)
ALP SERPL-CCNC: 52 U/L (ref 55–135)
ALT SERPL W/O P-5'-P-CCNC: 13 U/L (ref 10–44)
ANION GAP SERPL CALC-SCNC: 6 MMOL/L (ref 8–16)
AST SERPL-CCNC: 16 U/L (ref 10–40)
BASOPHILS # BLD AUTO: 0.09 K/UL (ref 0–0.2)
BASOPHILS NFR BLD: 1.2 % (ref 0–1.9)
BILIRUB SERPL-MCNC: 0.4 MG/DL (ref 0.1–1)
BILIRUB UR QL STRIP: NEGATIVE
BUN SERPL-MCNC: 9 MG/DL (ref 6–20)
CALCIUM SERPL-MCNC: 9.1 MG/DL (ref 8.7–10.5)
CHLORIDE SERPL-SCNC: 105 MMOL/L (ref 95–110)
CHOLEST SERPL-MCNC: 163 MG/DL (ref 120–199)
CHOLEST/HDLC SERPL: 3.5 {RATIO} (ref 2–5)
CLARITY UR: CLEAR
CO2 SERPL-SCNC: 30 MMOL/L (ref 23–29)
COLOR UR: COLORLESS
CREAT SERPL-MCNC: 0.9 MG/DL (ref 0.5–1.4)
CREAT SERPL-MCNC: 1 MG/DL (ref 0.5–1.4)
DIFFERENTIAL METHOD BLD: ABNORMAL
EOSINOPHIL # BLD AUTO: 0.9 K/UL (ref 0–0.5)
EOSINOPHIL NFR BLD: 11.3 % (ref 0–8)
ERYTHROCYTE [DISTWIDTH] IN BLOOD BY AUTOMATED COUNT: 13.7 % (ref 11.5–14.5)
EST. GFR  (NO RACE VARIABLE): >60 ML/MIN/1.73 M^2
GLUCOSE SERPL-MCNC: 130 MG/DL (ref 70–110)
GLUCOSE UR QL STRIP: NEGATIVE
HCT VFR BLD AUTO: 44.4 % (ref 37–48.5)
HDLC SERPL-MCNC: 47 MG/DL (ref 40–75)
HDLC SERPL: 28.8 % (ref 20–50)
HGB BLD-MCNC: 14.5 G/DL (ref 12–16)
HGB UR QL STRIP: NEGATIVE
IMM GRANULOCYTES # BLD AUTO: 0.04 K/UL (ref 0–0.04)
IMM GRANULOCYTES NFR BLD AUTO: 0.5 % (ref 0–0.5)
INR PPP: 0.9 (ref 0.8–1.2)
KETONES UR QL STRIP: NEGATIVE
LDLC SERPL CALC-MCNC: 80 MG/DL (ref 63–159)
LEUKOCYTE ESTERASE UR QL STRIP: NEGATIVE
LYMPHOCYTES # BLD AUTO: 2.3 K/UL (ref 1–4.8)
LYMPHOCYTES NFR BLD: 30.2 % (ref 18–48)
MCH RBC QN AUTO: 31 PG (ref 27–31)
MCHC RBC AUTO-ENTMCNC: 32.7 G/DL (ref 32–36)
MCV RBC AUTO: 95 FL (ref 82–98)
MONOCYTES # BLD AUTO: 0.7 K/UL (ref 0.3–1)
MONOCYTES NFR BLD: 9.1 % (ref 4–15)
NEUTROPHILS # BLD AUTO: 3.7 K/UL (ref 1.8–7.7)
NEUTROPHILS NFR BLD: 47.7 % (ref 38–73)
NITRITE UR QL STRIP: NEGATIVE
NONHDLC SERPL-MCNC: 116 MG/DL
NRBC BLD-RTO: 0 /100 WBC
OHS QRS DURATION: 76 MS
OHS QTC CALCULATION: 467 MS
PH UR STRIP: 7 [PH] (ref 5–8)
PLATELET # BLD AUTO: 228 K/UL (ref 150–450)
PMV BLD AUTO: 9.9 FL (ref 9.2–12.9)
POCT GLUCOSE: 125 MG/DL (ref 70–110)
POTASSIUM SERPL-SCNC: 3.7 MMOL/L (ref 3.5–5.1)
PROT SERPL-MCNC: 6.8 G/DL (ref 6–8.4)
PROT UR QL STRIP: NEGATIVE
PROTHROMBIN TIME: 10.4 SEC (ref 9–12.5)
RBC # BLD AUTO: 4.67 M/UL (ref 4–5.4)
SAMPLE: NORMAL
SODIUM SERPL-SCNC: 141 MMOL/L (ref 136–145)
SP GR UR STRIP: >1.03 (ref 1–1.03)
TRIGL SERPL-MCNC: 180 MG/DL (ref 30–150)
TSH SERPL DL<=0.005 MIU/L-ACNC: 0.78 UIU/ML (ref 0.34–5.6)
URN SPEC COLLECT METH UR: ABNORMAL
UROBILINOGEN UR STRIP-ACNC: NEGATIVE EU/DL
WBC # BLD AUTO: 7.71 K/UL (ref 3.9–12.7)

## 2024-12-06 PROCEDURE — 25000003 PHARM REV CODE 250: Performed by: NURSE PRACTITIONER

## 2024-12-06 PROCEDURE — 25000242 PHARM REV CODE 250 ALT 637 W/ HCPCS: Performed by: NURSE PRACTITIONER

## 2024-12-06 PROCEDURE — 99285 EMERGENCY DEPT VISIT HI MDM: CPT | Mod: 25

## 2024-12-06 PROCEDURE — G0378 HOSPITAL OBSERVATION PER HR: HCPCS

## 2024-12-06 PROCEDURE — 80053 COMPREHEN METABOLIC PANEL: CPT | Performed by: EMERGENCY MEDICINE

## 2024-12-06 PROCEDURE — 25000003 PHARM REV CODE 250: Performed by: STUDENT IN AN ORGANIZED HEALTH CARE EDUCATION/TRAINING PROGRAM

## 2024-12-06 PROCEDURE — 81003 URINALYSIS AUTO W/O SCOPE: CPT | Performed by: NURSE PRACTITIONER

## 2024-12-06 PROCEDURE — 85025 COMPLETE CBC W/AUTO DIFF WBC: CPT | Performed by: EMERGENCY MEDICINE

## 2024-12-06 PROCEDURE — 82565 ASSAY OF CREATININE: CPT

## 2024-12-06 PROCEDURE — 84443 ASSAY THYROID STIM HORMONE: CPT | Performed by: EMERGENCY MEDICINE

## 2024-12-06 PROCEDURE — 93005 ELECTROCARDIOGRAM TRACING: CPT | Performed by: GENERAL PRACTICE

## 2024-12-06 PROCEDURE — 36415 COLL VENOUS BLD VENIPUNCTURE: CPT | Performed by: EMERGENCY MEDICINE

## 2024-12-06 PROCEDURE — 51701 INSERT BLADDER CATHETER: CPT

## 2024-12-06 PROCEDURE — 93010 ELECTROCARDIOGRAM REPORT: CPT | Mod: ,,, | Performed by: GENERAL PRACTICE

## 2024-12-06 PROCEDURE — 25500020 PHARM REV CODE 255: Performed by: EMERGENCY MEDICINE

## 2024-12-06 PROCEDURE — 80061 LIPID PANEL: CPT | Performed by: EMERGENCY MEDICINE

## 2024-12-06 PROCEDURE — 82962 GLUCOSE BLOOD TEST: CPT

## 2024-12-06 PROCEDURE — 85610 PROTHROMBIN TIME: CPT | Performed by: EMERGENCY MEDICINE

## 2024-12-06 RX ORDER — HYDRALAZINE HYDROCHLORIDE 25 MG/1
25 TABLET, FILM COATED ORAL 3 TIMES DAILY
Status: ON HOLD | COMMUNITY
End: 2024-12-07 | Stop reason: HOSPADM

## 2024-12-06 RX ORDER — AZELASTINE 1 MG/ML
2 SPRAY, METERED NASAL NIGHTLY
Status: DISCONTINUED | OUTPATIENT
Start: 2024-12-06 | End: 2024-12-07 | Stop reason: HOSPADM

## 2024-12-06 RX ORDER — FLUTICASONE PROPIONATE 50 MCG
2 SPRAY, SUSPENSION (ML) NASAL NIGHTLY
Status: DISCONTINUED | OUTPATIENT
Start: 2024-12-06 | End: 2024-12-07 | Stop reason: HOSPADM

## 2024-12-06 RX ORDER — ASPIRIN 325 MG
325 TABLET ORAL
Status: DISCONTINUED | OUTPATIENT
Start: 2024-12-06 | End: 2024-12-06

## 2024-12-06 RX ORDER — LANOLIN ALCOHOL/MO/W.PET/CERES
800 CREAM (GRAM) TOPICAL
Status: DISCONTINUED | OUTPATIENT
Start: 2024-12-06 | End: 2024-12-07 | Stop reason: HOSPADM

## 2024-12-06 RX ORDER — ATORVASTATIN CALCIUM 40 MG/1
40 TABLET, FILM COATED ORAL DAILY
Status: DISCONTINUED | OUTPATIENT
Start: 2024-12-07 | End: 2024-12-07 | Stop reason: HOSPADM

## 2024-12-06 RX ORDER — ONDANSETRON HYDROCHLORIDE 2 MG/ML
4 INJECTION, SOLUTION INTRAVENOUS EVERY 6 HOURS PRN
Status: DISCONTINUED | OUTPATIENT
Start: 2024-12-06 | End: 2024-12-07 | Stop reason: HOSPADM

## 2024-12-06 RX ORDER — PANTOPRAZOLE SODIUM 40 MG/1
40 TABLET, DELAYED RELEASE ORAL DAILY
Status: DISCONTINUED | OUTPATIENT
Start: 2024-12-07 | End: 2024-12-07 | Stop reason: HOSPADM

## 2024-12-06 RX ORDER — BISACODYL 10 MG/1
10 SUPPOSITORY RECTAL DAILY PRN
Status: DISCONTINUED | OUTPATIENT
Start: 2024-12-06 | End: 2024-12-07 | Stop reason: HOSPADM

## 2024-12-06 RX ORDER — SODIUM,POTASSIUM PHOSPHATES 280-250MG
2 POWDER IN PACKET (EA) ORAL
Status: DISCONTINUED | OUTPATIENT
Start: 2024-12-06 | End: 2024-12-07 | Stop reason: HOSPADM

## 2024-12-06 RX ORDER — ACETAMINOPHEN 325 MG/1
650 TABLET ORAL EVERY 6 HOURS PRN
Status: DISCONTINUED | OUTPATIENT
Start: 2024-12-07 | End: 2024-12-07 | Stop reason: HOSPADM

## 2024-12-06 RX ORDER — LABETALOL HYDROCHLORIDE 5 MG/ML
10 INJECTION, SOLUTION INTRAVENOUS
Status: DISCONTINUED | OUTPATIENT
Start: 2024-12-06 | End: 2024-12-07 | Stop reason: HOSPADM

## 2024-12-06 RX ORDER — LABETALOL HYDROCHLORIDE 5 MG/ML
10 INJECTION, SOLUTION INTRAVENOUS
Status: DISCONTINUED | OUTPATIENT
Start: 2024-12-06 | End: 2024-12-06

## 2024-12-06 RX ORDER — SODIUM CHLORIDE 0.9 % (FLUSH) 0.9 %
10 SYRINGE (ML) INJECTION
Status: DISCONTINUED | OUTPATIENT
Start: 2024-12-06 | End: 2024-12-07 | Stop reason: HOSPADM

## 2024-12-06 RX ORDER — NAPROXEN SODIUM 220 MG/1
81 TABLET, FILM COATED ORAL DAILY
Status: DISCONTINUED | OUTPATIENT
Start: 2024-12-06 | End: 2024-12-07 | Stop reason: HOSPADM

## 2024-12-06 RX ORDER — FUROSEMIDE 20 MG/1
20 TABLET ORAL DAILY
Status: ON HOLD | COMMUNITY
End: 2024-12-07 | Stop reason: HOSPADM

## 2024-12-06 RX ADMIN — IOHEXOL 100 ML: 350 INJECTION, SOLUTION INTRAVENOUS at 03:12

## 2024-12-06 RX ADMIN — APIXABAN 5 MG: 5 TABLET, FILM COATED ORAL at 10:12

## 2024-12-06 RX ADMIN — ACETAMINOPHEN 650 MG: 325 TABLET ORAL at 11:12

## 2024-12-06 RX ADMIN — ASPIRIN 81 MG 81 MG: 81 TABLET ORAL at 07:12

## 2024-12-06 RX ADMIN — FLUTICASONE PROPIONATE 100 MCG: 50 SPRAY, METERED NASAL at 11:12

## 2024-12-06 RX ADMIN — AZELASTINE HYDROCHLORIDE 274 MCG: 137 SPRAY, METERED NASAL at 11:12

## 2024-12-06 NOTE — HPI
58-year-old female with a past medical history of COPD, hypertension, chronic kidney disease, thyroid disease, carotid stenosis, current tobacco smoker long-term anticoagulation use with Eliquis, fibromyalgia, PTSD, depression who presented to the emergency room with reports of Right arm weakness, difficulty speaking, and facial droop that started at 2:30 p.m. today.  She reports it lasted about a minute and then completely resolved.  Patient reports noncompliance with her Eliquis doses and her last dose was several days ago.  In the ER, CBC unremarkable, CMP with a glucose of 130 K 3.7, LDL 80 triglycerides 180 TSH 0.778, UA pending  CTA head and neck with no large vessel occlusion no significant stenosis, CT head with no acute intracranial process.  EKG with atrial fibribillation  LKW 12/6/24 @ 1425, symptoms completely resolved within a minute telemedicine vascular neurology consulted  Not a TNK candidate due to symptoms resolving  Admit to hospital medicine for TIA versus stroke workup.

## 2024-12-06 NOTE — ASSESSMENT & PLAN NOTE
LKW 12/6/24 @1430,   No TNK candidate,   Symptoms lasting 1 min, presented with left facial droop, slurred speech, left arm weakness, left arm numbness, symptoms resolving  Continues to have left visual field cut deficit.   CT head negative  CTA head and neck with no LVO or significant stenosis  Begin aspirin/statin  Echo ordered  MRI ordered  PT/OT eval and treat    Permissive hypertension

## 2024-12-06 NOTE — SUBJECTIVE & OBJECTIVE
Past Medical History:   Diagnosis Date    Allergy     Anxiety     Carotid stenosis 2016    Chronic anemia     Chronic kidney disease, stage 3a 2024    COPD (chronic obstructive pulmonary disease)     Depression     Fibromyalgia     Osteoarthritis     Pinched nerve     PTSD (post-traumatic stress disorder)     Tachycardia     Thyroid disease     Torn rotator cuff     Uterine fibroid 10/2016    Vertigo        Past Surgical History:   Procedure Laterality Date     SECTION      HYSTERECTOMY      LAMINECTOMY      TONSILLECTOMY         Review of patient's allergies indicates:   Allergen Reactions    Clindamycin Hives    Pcn [penicillins] Anaphylaxis    Diphenhydramine hcl Hives    Influenza virus vaccines     Oxycodone     Adhesive Rash    Metoprolol Rash       No current facility-administered medications on file prior to encounter.     Current Outpatient Medications on File Prior to Encounter   Medication Sig    albuterol (PROVENTIL/VENTOLIN HFA) 90 mcg/actuation inhaler Inhale 1-2 puffs into the lungs every 6 (six) hours as needed for Wheezing. Rescue    albuterol-ipratropium (DUO-NEB) 2.5 mg-0.5 mg/3 mL nebulizer solution Take 3 mLs by nebulization every 6 (six) hours as needed for Wheezing or Shortness of Breath. Rescue    amLODIPine (NORVASC) 10 MG tablet Take 1 tablet (10 mg total) by mouth once daily.    apixaban (ELIQUIS) 5 mg Tab Take 1 tablet (5 mg total) by mouth 2 (two) times daily.    azelastine (ASTELIN) 137 mcg (0.1 %) nasal spray 2 sprays into each side of nose before bed every night, may use in AM also    cloNIDine (CATAPRES) 0.1 MG tablet Take 0.1 mg by mouth 3 (three) times daily.    fluticasone propionate (FLONASE) 50 mcg/actuation nasal spray 2 sprays (100 mcg total) by Each Nostril route every evening. 2 sprays into each nostril before bed every night    lisinopriL (PRINIVIL,ZESTRIL) 40 MG tablet Take 1 tablet (40 mg total) by mouth once daily.    nebivoloL (BYSTOLIC) 10 MG Tab Take  1 tablet (10 mg total) by mouth 2 (two) times a day. (Patient taking differently: Take 10 mg by mouth once daily.)    NYSTOP powder Apply topically 2 (two) times daily.    omeprazole (PRILOSEC) 40 MG capsule TAKE 1 CAPSULE BY MOUTH TWICE DAILY BEFORE MEAL(S)     Family History       Problem Relation (Age of Onset)    Arthritis Mother, Father    Asthma Father    COPD Mother, Father    Cancer Maternal Grandmother    Depression Mother, Father    Heart attack Father, Maternal Grandmother    Heart disease Father    Hypertension Mother, Father, Maternal Grandmother, Paternal Grandmother    Stroke Paternal Grandmother    Tuberculosis Paternal Grandfather          Tobacco Use    Smoking status: Never    Smokeless tobacco: Never   Substance and Sexual Activity    Alcohol use: No    Drug use: Yes     Types: Marijuana    Sexual activity: Not on file     Review of Systems   Constitutional:  Positive for activity change.   Eyes:  Positive for visual disturbance.   Respiratory: Negative.     Cardiovascular: Negative.    Gastrointestinal: Negative.    Genitourinary: Negative.    Musculoskeletal: Negative.    Skin: Negative.    Neurological:  Positive for facial asymmetry, speech difficulty, weakness and numbness.   Psychiatric/Behavioral: Negative.       Objective:     Vital Signs (Most Recent):  Pulse: 103 (12/06/24 1513)  Resp: 18 (12/06/24 1513)  BP: (!) 134/93 (12/06/24 1513)  SpO2: 96 % (12/06/24 1513) Vital Signs (24h Range):  Pulse:  [103] 103  Resp:  [18] 18  SpO2:  [96 %] 96 %  BP: (134)/(93) 134/93     Weight: 85.7 kg (189 lb)  Body mass index is 29.6 kg/m².     Physical Exam  Vitals and nursing note reviewed.   Constitutional:       General: She is not in acute distress.     Appearance: She is not ill-appearing.   HENT:      Head: Normocephalic and atraumatic.      Mouth/Throat:      Mouth: Mucous membranes are moist.   Eyes:      General: Visual field deficit present.      Extraocular Movements: Extraocular movements  intact.      Pupils: Pupils are equal, round, and reactive to light.   Cardiovascular:      Rate and Rhythm: Tachycardia present. Rhythm irregularly irregular.      Heart sounds: No murmur heard.  Pulmonary:      Effort: Pulmonary effort is normal. No respiratory distress.      Breath sounds: Normal breath sounds. No wheezing.   Abdominal:      General: Bowel sounds are normal. There is no distension.      Palpations: Abdomen is soft.      Tenderness: There is no abdominal tenderness.   Musculoskeletal:      Right lower leg: No edema.      Left lower leg: No edema.   Skin:     General: Skin is warm and dry.      Capillary Refill: Capillary refill takes less than 2 seconds.   Neurological:      Mental Status: She is alert and oriented to person, place, and time.      GCS: GCS eye subscore is 4. GCS verbal subscore is 5. GCS motor subscore is 6.      Comments: Left eye with visual field deficit. Loss of peripheral vision   Psychiatric:         Mood and Affect: Mood normal.              CRANIAL NERVES     CN III, IV, VI   Pupils are equal, round, and reactive to light.       Significant Labs: All pertinent labs within the past 24 hours have been reviewed.  Recent Lab Results         12/06/24  1522   12/06/24  1521   12/06/24  1513        Albumin   4.2         ALP   52         ALT   13         Anion Gap   6         AST   16         Baso #   0.09         Basophil %   1.2         BILIRUBIN TOTAL   0.4  Comment: For infants and newborns, interpretation of results should be based  on gestational age, weight and in agreement with clinical  observations.    Premature Infant recommended reference ranges:  Up to 24 hours.............<8.0 mg/dL  Up to 48 hours............<12.0 mg/dL  3-5 days..................<15.0 mg/dL  6-29 days.................<15.0 mg/dL           BUN   9         Calcium   9.1         Chloride   105         Cholesterol Total   163  Comment: The National Cholesterol Education Program (NCEP) has set  the  following guidelines (reference ranges) for Cholesterol:  Optimal.....................<200 mg/dL  Borderline High.............200-239 mg/dL  High........................> or = 240 mg/dL           CO2   30         Creatinine   0.9         Differential Method   Automated         eGFR   >60.0         Eos #   0.9         Eos %   11.3         Glucose   130         Gran # (ANC)   3.7         Gran %   47.7         HDL   47  Comment: The National Cholesterol Education Program (NCEP) has set the  following guidelines (reference values) for HDL Cholesterol:  Low...............<40 mg/dL  Optimal...........>60 mg/dL           HDL/Cholesterol Ratio   28.8         Hematocrit   44.4         Hemoglobin   14.5         Immature Grans (Abs)   0.04  Comment: Mild elevation in immature granulocytes is non specific and   can be seen in a variety of conditions including stress response,   acute inflammation, trauma and pregnancy. Correlation with other   laboratory and clinical findings is essential.           Immature Granulocytes   0.5         INR   0.9  Comment: Coumadin Therapy:  2.0 - 3.0 for INR for all indicators except mechanical heart valves  and antiphospholipid syndromes which should use 2.5 - 3.5.           LDL Cholesterol   80.0  Comment: The National Cholesterol Education Program (NCEP) has set the  following guidelines (reference values) for LDL Cholesterol:  Optimal.......................<130 mg/dL  Borderline High...............130-159 mg/dL  High..........................160-189 mg/dL  Very High.....................>190 mg/dL           Lymph #   2.3         Lymph %   30.2         MCH   31.0         MCHC   32.7         MCV   95         Mono #   0.7         Mono %   9.1         MPV   9.9         Non-HDL Cholesterol   116  Comment: Risk category and Non-HDL cholesterol goals:  Coronary heart disease (CHD)or equivalent (10-year risk of CHD >20%):  Non-HDL cholesterol goal     <130 mg/dL  Two or more CHD risk factors and  10-year risk of CHD <= 20%:  Non-HDL cholesterol goal     <160 mg/dL  0 to 1 CHD risk factor:  Non-HDL cholesterol goal     <190 mg/dL           nRBC   0         Platelet Count   228         POC Creatinine 1.0           POCT Glucose     125       Potassium   3.7         PROTEIN TOTAL   6.8         PT   10.4         RBC   4.67         RDW   13.7         Sample VENOUS           Sodium   141         Total Cholesterol/HDL Ratio   3.5         Triglycerides   180  Comment: The National Cholesterol Education Program (NCEP) has set the  following guidelines (reference values) for triglycerides:  Normal......................<150 mg/dL  Borderline High.............150-199 mg/dL  High........................200-499 mg/dL           TSH   0.778         WBC   7.71                 Significant Imaging: I have reviewed all pertinent imaging results/findings within the past 24 hours.  Imaging Results              CTA Head and Neck (xpd) (Final result)  Result time 12/06/24 15:36:21      Final result by Tiara Cooper MD (12/06/24 15:36:21)                   Impression:      Normal CTA of the head and neck      Electronically signed by: Tiara Cooper  Date:    12/06/2024  Time:    15:36               Narrative:    EXAMINATION:  CTA HEAD AND NECK (XPD)    CLINICAL HISTORY:  Transient ischemic attack (TIA);    TECHNIQUE:  Non contrast low dose axial images were obtained through the head. CT angiogram was performed from the level of the darcy to the top of the head following the IV administration of 100mL of Omnipaque 350.   Sagittal and coronal reconstructions and maximum intensity projection reconstructions were performed. Arterial stenosis percentages are based on NASCET measurement criteria.    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    Subclavian  arteries: The subclavian arteries are without hemodynamically significant stenosis or occlusion.    Right carotid: The right common carotid artery is without significant stenosis.   The right internal carotid artery is without significant stenosis, occlusion, or dissection.    Left carotid: The left common carotid artery is without significant stenosis.   The left internal carotid artery is without significant stenosis, occlusion, or dissection.    Extracranial vertebral arteries:  The vertebral arteries are without significant stenosis, occlusion, or dissection to the skull base.    Intracranial:    Anterior circulation: The course and caliber the internal carotid arteries are normal.  No areas of significant atherosclerotic narrowing or filling defects are identified.  The middle cerebral arteries are normal.  The anterior cerebral arteries are normal.    Posterior circulation: The vertebral arteries are normal. The basilar artery is normal. The posterior cerebral arteries are normal.    No evidence of aneurysm or arteriovenous malformation.    Dural venous sinuses are patent.    Other:    The paraspinous soft tissues are unremarkable.  There are mildly prominent cervical lymph nodes most likely reactive.  The orbits are unremarkable.    The visualized portions of the lung apices are unremarkable.    There are degenerative spine changes. No concerning osseous lesions identified.  There is mucosal thickening the maxillary and ethmoid sinuses.  The remainder of the paranasal sinuses and mastoid air cells are clear.                                       CT HEAD FOR STROKE (Final result)  Result time 12/06/24 15:27:18      Final result by Tiara Cooper MD (12/06/24 15:27:18)                   Impression:      No acute intracranial process    These findings were called to the ordering physician at 15:25      Electronically signed by: Tiara Cooper  Date:    12/06/2024  Time:    15:27               Narrative:     CLINICAL HISTORY:  ([id number])57 y/o  (1965) F    Neuro deficit, acute, stroke suspected;    TECHNIQUE:  (A#[id number], exam time 12/6/2024 15:21)    CT HEAD FOR STROKE [id number]    Axial CT of the brain without contrast using soft tissue and bone algorithm. None.    CMS MANDATED QUALITY DATA - CT RADIATION - 436    All CT scans at this facility utilize dose modulation, iterative reconstruction, and/or weight based dosing when appropriate to reduce radiation dose to as low as reasonably achievable.    COMPARISON:  12/06/2018    FINDINGS:  The ventricles and sulci are normal in size and configuration for age.  There is no hemorrhage, mass or midline shift.  There are no extra-axial fluid collections.    The cerebellum and brainstem are normal.  The gray-white differentiation is maintained.  The orbits are unremarkable.    There is mucosal thickening in the maxillary and ethmoid sinuses.  The remainder of the paranasal sinuses are clear.  The calvarium is intact.

## 2024-12-06 NOTE — ED PROVIDER NOTES
Encounter Date: 2024       History     Chief Complaint   Patient presents with    Aphasia     Pt states around 1430 she had an episode of right arm weakness and difficulty speaking. Symptoms have resolved upon ems arrival to ed     58-year-old female with a history of carotid stenosis CKD COPD depression fibromyalgia PTSD presents with right arm weakness and speech difficulty that started earlier today.  Lasted for about a minute now completely resolved.  That was around 230pm.  All symptoms resolved at this time.  She is on anticoagulation but is noncompliant and does not take it regularly.  Last dose was several days ago.  Continue smoking.  She is supposed to take Eliquis.  She actually recorded her symptoms earlier.        Review of patient's allergies indicates:   Allergen Reactions    Clindamycin Hives    Pcn [penicillins] Anaphylaxis    Diphenhydramine hcl Hives    Influenza virus vaccines     Oxycodone     Adhesive Rash    Metoprolol Rash     Past Medical History:   Diagnosis Date    Allergy     Anxiety     Carotid stenosis 2016    Chronic anemia     Chronic kidney disease, stage 3a 2024    COPD (chronic obstructive pulmonary disease)     Depression     Fibromyalgia     Osteoarthritis     Pinched nerve     PTSD (post-traumatic stress disorder)     Tachycardia     Thyroid disease     Torn rotator cuff     Uterine fibroid 10/2016    Vertigo      Past Surgical History:   Procedure Laterality Date     SECTION      HYSTERECTOMY      LAMINECTOMY      TONSILLECTOMY       Family History   Problem Relation Name Age of Onset    Arthritis Mother      Hypertension Mother      COPD Mother      Depression Mother      Arthritis Father      Depression Father      Hypertension Father      Heart disease Father      COPD Father      Heart attack Father      Asthma Father      Heart attack Maternal Grandmother      Cancer Maternal Grandmother      Hypertension Maternal Grandmother      Hypertension  Paternal Grandmother      Stroke Paternal Grandmother      Tuberculosis Paternal Grandfather       Social History     Tobacco Use    Smoking status: Never    Smokeless tobacco: Never   Substance Use Topics    Alcohol use: No    Drug use: Yes     Types: Marijuana     Review of Systems   Neurological:  Positive for speech difficulty and weakness.       Physical Exam     Initial Vitals [12/06/24 1513]   BP Pulse Resp Temp SpO2   (!) 134/93 103 18 -- 96 %      MAP       --         Physical Exam    Nursing note and vitals reviewed.  Constitutional: She appears well-developed and well-nourished. She is not diaphoretic. No distress.   HENT:   Head: Normocephalic and atraumatic.   Eyes: EOM are normal.   Neck: Neck supple.   Normal range of motion.  Cardiovascular:  Normal rate, regular rhythm and normal heart sounds.     Exam reveals no gallop and no friction rub.       No murmur heard.  Pulmonary/Chest: Breath sounds normal. No respiratory distress. She has no wheezes. She has no rhonchi. She has no rales.   Musculoskeletal:         General: Normal range of motion.      Cervical back: Normal range of motion and neck supple.     Neurological: She is alert and oriented to person, place, and time.   Skin: Skin is warm and dry.   Psychiatric: She has a normal mood and affect. Her behavior is normal. Judgment and thought content normal.         ED Course   Procedures  Labs Reviewed   CBC W/ AUTO DIFFERENTIAL - Abnormal       Result Value    WBC 7.71      RBC 4.67      Hemoglobin 14.5      Hematocrit 44.4      MCV 95      MCH 31.0      MCHC 32.7      RDW 13.7      Platelets 228      MPV 9.9      Immature Granulocytes 0.5      Gran # (ANC) 3.7      Immature Grans (Abs) 0.04      Lymph # 2.3      Mono # 0.7      Eos # 0.9 (*)     Baso # 0.09      nRBC 0      Gran % 47.7      Lymph % 30.2      Mono % 9.1      Eosinophil % 11.3 (*)     Basophil % 1.2      Differential Method Automated     COMPREHENSIVE METABOLIC PANEL - Abnormal     Sodium 141      Potassium 3.7      Chloride 105      CO2 30 (*)     Glucose 130 (*)     BUN 9      Creatinine 0.9      Calcium 9.1      Total Protein 6.8      Albumin 4.2      Total Bilirubin 0.4      Alkaline Phosphatase 52 (*)     AST 16      ALT 13      eGFR >60.0      Anion Gap 6 (*)    LIPID PANEL - Abnormal    Cholesterol 163      Triglycerides 180 (*)     HDL 47      LDL Cholesterol 80.0      HDL/Cholesterol Ratio 28.8      Total Cholesterol/HDL Ratio 3.5      Non-HDL Cholesterol 116     POCT GLUCOSE - Abnormal    POCT Glucose 125 (*)    PROTIME-INR    Prothrombin Time 10.4      INR 0.9     TSH    TSH 0.778     URINALYSIS, REFLEX TO URINE CULTURE   ISTAT CREATININE    POC Creatinine 1.0      Sample VENOUS          ECG Results              ECG 12 lead (In process)        Collection Time Result Time QRS Duration OHS QTC Calculation    12/06/24 15:47:33 12/06/24 16:03:55 76 467                     In process by Interface, Lab In [facility] (12/06/24 16:04:00)                   Narrative:    Test Reason : R29.818,    Vent. Rate :  84 BPM     Atrial Rate :    BPM     P-R Int :    ms          QRS Dur :  76 ms      QT Int : 396 ms       P-R-T Axes :     52  30 degrees    QTcB Int : 467 ms    Atrial fibrillation  Abnormal ECG  No previous ECGs available    Referred By:            Confirmed By:                                   Imaging Results              MRI Brain Without Contrast (In process)                      CTA Head and Neck (xpd) (Final result)  Result time 12/06/24 15:36:21      Final result by Tiara Cooper MD (12/06/24 15:36:21)                   Impression:      Normal CTA of the head and neck      Electronically signed by: Tiara Cooper  Date:    12/06/2024  Time:    15:36               Narrative:    EXAMINATION:  CTA HEAD AND NECK (XPD)    CLINICAL HISTORY:  Transient ischemic attack (TIA);    TECHNIQUE:  Non contrast low dose axial images were obtained through the head. CT angiogram was  performed from the level of the darcy to the top of the head following the IV administration of 100mL of Omnipaque 350.   Sagittal and coronal reconstructions and maximum intensity projection reconstructions were performed. Arterial stenosis percentages are based on NASCET measurement criteria.    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    Subclavian arteries: The subclavian arteries are without hemodynamically significant stenosis or occlusion.    Right carotid: The right common carotid artery is without significant stenosis.   The right internal carotid artery is without significant stenosis, occlusion, or dissection.    Left carotid: The left common carotid artery is without significant stenosis.   The left internal carotid artery is without significant stenosis, occlusion, or dissection.    Extracranial vertebral arteries:  The vertebral arteries are without significant stenosis, occlusion, or dissection to the skull base.    Intracranial:    Anterior circulation: The course and caliber the internal carotid arteries are normal.  No areas of significant atherosclerotic narrowing or filling defects are identified.  The middle cerebral arteries are normal.  The anterior cerebral arteries are normal.    Posterior circulation: The vertebral arteries are normal. The basilar artery is normal. The posterior cerebral arteries are normal.    No evidence of aneurysm or arteriovenous malformation.    Dural venous sinuses are patent.    Other:    The paraspinous soft tissues are unremarkable.  There are mildly prominent cervical lymph nodes most likely reactive.  The orbits are unremarkable.    The visualized portions of the lung apices are unremarkable.    There are degenerative spine changes. No concerning osseous lesions identified.  There is mucosal  thickening the maxillary and ethmoid sinuses.  The remainder of the paranasal sinuses and mastoid air cells are clear.                                       CT HEAD FOR STROKE (Final result)  Result time 12/06/24 15:27:18      Final result by Tiara Cooper MD (12/06/24 15:27:18)                   Impression:      No acute intracranial process    These findings were called to the ordering physician at 15:25      Electronically signed by: Tiara Cooper  Date:    12/06/2024  Time:    15:27               Narrative:    CLINICAL HISTORY:  ([id number] y/o  (1965) F    Neuro deficit, acute, stroke suspected;    TECHNIQUE:  (A#[id number], exam time 12/6/2024 15:21)    CT HEAD FOR STROKE [id number]    Axial CT of the brain without contrast using soft tissue and bone algorithm. None.    CMS MANDATED QUALITY DATA - CT RADIATION - 436    All CT scans at this facility utilize dose modulation, iterative reconstruction, and/or weight based dosing when appropriate to reduce radiation dose to as low as reasonably achievable.    COMPARISON:  12/06/2018    FINDINGS:  The ventricles and sulci are normal in size and configuration for age.  There is no hemorrhage, mass or midline shift.  There are no extra-axial fluid collections.    The cerebellum and brainstem are normal.  The gray-white differentiation is maintained.  The orbits are unremarkable.    There is mucosal thickening in the maxillary and ethmoid sinuses.  The remainder of the paranasal sinuses are clear.  The calvarium is intact.                                       Medications   sodium chloride 0.9% flush 10 mL (has no administration in time range)   bisacodyL suppository 10 mg (has no administration in time range)   ondansetron injection 4 mg (has no administration in time range)   atorvastatin tablet 40 mg (has no administration in time range)   sodium chloride 0.9% bolus 500 mL 500 mL (has no administration in time range)   aspirin chewable tablet 81 mg (81  mg Oral Given 12/6/24 1925)   apixaban tablet 5 mg (has no administration in time range)   labetaloL injection 10 mg (has no administration in time range)   pantoprazole EC tablet 40 mg (has no administration in time range)   potassium bicarbonate disintegrating tablet 50 mEq (has no administration in time range)   potassium bicarbonate disintegrating tablet 35 mEq (has no administration in time range)   potassium bicarbonate disintegrating tablet 60 mEq (has no administration in time range)   magnesium oxide tablet 800 mg (has no administration in time range)   magnesium oxide tablet 800 mg (has no administration in time range)   potassium, sodium phosphates 280-160-250 mg packet 2 packet (has no administration in time range)   potassium, sodium phosphates 280-160-250 mg packet 2 packet (has no administration in time range)   potassium, sodium phosphates 280-160-250 mg packet 2 packet (has no administration in time range)   iohexoL (OMNIPAQUE 350) injection 100 mL (100 mLs Intravenous Given 12/6/24 1526)     Medical Decision Making  58-year-old female history COPD, continues to smoke presents to the ER with right arm numbness and weakness that lasted 1 minute but resolved.  She is supposed to be on anticoagulation but is noncompliant and does not take it as prescribed.  Has a possible visual field deficit in the emergency room.  She will be admitted to Hospital Medicine for TIA CVA workup.  Not a candidate for tPA given that her symptoms had completely resolved on arrival.    Amount and/or Complexity of Data Reviewed  Labs: ordered.  Radiology: ordered. Decision-making details documented in ED Course.  ECG/medicine tests: ordered and independent interpretation performed.     Details: Atrial fibrillation 84 beats per minute normal axis no ST elevation or depression or T-wave inversion independently interpreted.    Risk  Prescription drug management.               ED Course as of 12/06/24 2033   Fri Dec 06, 2024    1513 Last known normal: 230p  Provider contact time: arrival  Head CT Read by MD: 3:26 PM  negative  Neurology consult ordered by ED MD:  N    VAN positive? Y  resolved (weakness plus any other cortical finding. If so, call stroke alert out to 24 hours):    Weakness: Yes - resolved    Visual changes: N  Aphasia: No  Neglect: No    NIH score: 1  TPa administered: N, sx resolved, on AC, states non compliant but still is on AC (if not, why not)     [EF]   1514 Sx resolved [EF]   1545 CTA Head and Neck (xpd) [EF]   1643 Refused asa [EF]   1649 Marine to admit [EF]      ED Course User Index  [EF] Los Reyna MD                           Clinical Impression:  Final diagnoses:  [R29.818] Acute focal neurological deficit          ED Disposition Condition    Observation Stable                Los Reyna MD  12/06/24 2040

## 2024-12-06 NOTE — ASSESSMENT & PLAN NOTE
Patient has paroxysmal (<7 days) atrial fibrillation. Patient is currently in atrial fibrillation. [id number] Score: 1. The patients heart rate in the last 24 hours is as follows:  Pulse  Min: 103  Max: 103     Antiarrhythmics     Continue nebivolol  Anticoagulants     restart eliquis  Plan  - Replete lytes with a goal of K>4, Mg >2  - Patient is not anticoagulated due to refusing to take her eliquis  - Patient's afib is currently controlled  - restart eliquis, continue nebivolol

## 2024-12-07 ENCOUNTER — CLINICAL SUPPORT (OUTPATIENT)
Dept: CARDIOLOGY | Facility: HOSPITAL | Age: 59
End: 2024-12-07
Attending: EMERGENCY MEDICINE
Payer: MEDICARE

## 2024-12-07 VITALS — WEIGHT: 189.38 LBS | BODY MASS INDEX: 29.72 KG/M2 | HEIGHT: 67 IN

## 2024-12-07 VITALS
WEIGHT: 189.38 LBS | HEIGHT: 67 IN | BODY MASS INDEX: 29.72 KG/M2 | HEART RATE: 86 BPM | DIASTOLIC BLOOD PRESSURE: 89 MMHG | SYSTOLIC BLOOD PRESSURE: 134 MMHG | RESPIRATION RATE: 17 BRPM | OXYGEN SATURATION: 96 % | TEMPERATURE: 98 F

## 2024-12-07 LAB
ALBUMIN SERPL BCP-MCNC: 4 G/DL (ref 3.5–5.2)
ALP SERPL-CCNC: 59 U/L (ref 55–135)
ALT SERPL W/O P-5'-P-CCNC: 11 U/L (ref 10–44)
ANION GAP SERPL CALC-SCNC: 6 MMOL/L (ref 8–16)
AORTIC ROOT ANNULUS: 2.9 CM
AORTIC VALVE CUSP SEPERATION: 2 CM
APICAL FOUR CHAMBER EJECTION FRACTION: 64 %
APICAL TWO CHAMBER EJECTION FRACTION: 44 %
APTT PPP: 32.3 SEC (ref 21–32)
ASCENDING AORTA: 3.4 CM
AST SERPL-CCNC: 13 U/L (ref 10–40)
AV INDEX (PROSTH): 0.85
AV MEAN GRADIENT: 2 MMHG
AV PEAK GRADIENT: 3.2 MMHG
AV VALVE AREA BY VELOCITY RATIO: 3.5 CM²
AV VALVE AREA: 2.9 CM²
AV VELOCITY RATIO: 1
BASOPHILS # BLD AUTO: 0.07 K/UL (ref 0–0.2)
BASOPHILS NFR BLD: 1.3 % (ref 0–1.9)
BILIRUB SERPL-MCNC: 0.5 MG/DL (ref 0.1–1)
BSA FOR ECHO PROCEDURE: 2.01 M2
BUN SERPL-MCNC: 7 MG/DL (ref 6–20)
CALCIUM SERPL-MCNC: 9.5 MG/DL (ref 8.7–10.5)
CHLORIDE SERPL-SCNC: 104 MMOL/L (ref 95–110)
CK MB SERPL-MCNC: 1.9 NG/ML (ref 0.1–6.5)
CO2 SERPL-SCNC: 33 MMOL/L (ref 23–29)
CREAT SERPL-MCNC: 0.9 MG/DL (ref 0.5–1.4)
CV ECHO LV RWT: 0.54 CM
DIFFERENTIAL METHOD BLD: ABNORMAL
DOP CALC AO PEAK VEL: 0.9 M/S
DOP CALC AO VTI: 17.6 CM
DOP CALC LVOT AREA: 3.5 CM2
DOP CALC LVOT DIAMETER: 2.1 CM
DOP CALC LVOT PEAK VEL: 0.9 M/S
DOP CALC LVOT STROKE VOLUME: 51.6 CM3
DOP CALC MV VTI: 26.1 CM
DOP CALCLVOT PEAK VEL VTI: 14.9 CM
E WAVE DECELERATION TIME: 225 MSEC
E/E' RATIO: 10.08 M/S
ECHO LV POSTERIOR WALL: 1.1 CM (ref 0.6–1.1)
EOSINOPHIL # BLD AUTO: 0.6 K/UL (ref 0–0.5)
EOSINOPHIL NFR BLD: 10.9 % (ref 0–8)
ERYTHROCYTE [DISTWIDTH] IN BLOOD BY AUTOMATED COUNT: 13.6 % (ref 11.5–14.5)
EST. GFR  (NO RACE VARIABLE): >60 ML/MIN/1.73 M^2
ESTIMATED AVG GLUCOSE: 111 MG/DL (ref 68–131)
FRACTIONAL SHORTENING: 29.3 % (ref 28–44)
GLUCOSE SERPL-MCNC: 86 MG/DL (ref 70–110)
HBA1C MFR BLD: 5.5 % (ref 4.5–6.2)
HCT VFR BLD AUTO: 45.2 % (ref 37–48.5)
HGB BLD-MCNC: 14.8 G/DL (ref 12–16)
IMM GRANULOCYTES # BLD AUTO: 0.01 K/UL (ref 0–0.04)
IMM GRANULOCYTES NFR BLD AUTO: 0.2 % (ref 0–0.5)
INR PPP: 1 (ref 0.8–1.2)
INTERVENTRICULAR SEPTUM: 1.3 CM (ref 0.6–1.1)
IVC DIAMETER: 1.6 CM
LEFT ATRIUM AREA SYSTOLIC (APICAL 4 CHAMBER): 36.8 CM2
LEFT ATRIUM SIZE: 4.4 CM
LEFT INTERNAL DIMENSION IN SYSTOLE: 2.9 CM (ref 2.1–4)
LEFT VENTRICLE DIASTOLIC VOLUME INDEX: 37.47 ML/M2
LEFT VENTRICLE DIASTOLIC VOLUME: 74.2 ML
LEFT VENTRICLE END DIASTOLIC VOLUME APICAL 2 CHAMBER: 54.4 ML
LEFT VENTRICLE END DIASTOLIC VOLUME APICAL 4 CHAMBER: 62.2 ML
LEFT VENTRICLE END SYSTOLIC VOLUME APICAL 4 CHAMBER: 135 ML
LEFT VENTRICLE MASS INDEX: 86.7 G/M2
LEFT VENTRICLE SYSTOLIC VOLUME INDEX: 16.3 ML/M2
LEFT VENTRICLE SYSTOLIC VOLUME: 32.2 ML
LEFT VENTRICULAR INTERNAL DIMENSION IN DIASTOLE: 4.1 CM (ref 3.5–6)
LEFT VENTRICULAR MASS: 171.7 G
LV LATERAL E/E' RATIO: 8.07 M/S
LV SEPTAL E/E' RATIO: 13.44 M/S
LVED V (TEICH): 74.2 ML
LVES V (TEICH): 32.2 ML
LVOT MG: 2 MMHG
LVOT MV: 0.6 CM/S
LYMPHOCYTES # BLD AUTO: 2 K/UL (ref 1–4.8)
LYMPHOCYTES NFR BLD: 38.2 % (ref 18–48)
MAGNESIUM SERPL-MCNC: 2 MG/DL (ref 1.6–2.6)
MCH RBC QN AUTO: 30.9 PG (ref 27–31)
MCHC RBC AUTO-ENTMCNC: 32.7 G/DL (ref 32–36)
MCV RBC AUTO: 94 FL (ref 82–98)
MONOCYTES # BLD AUTO: 0.5 K/UL (ref 0.3–1)
MONOCYTES NFR BLD: 9.5 % (ref 4–15)
MV MEAN GRADIENT: 2 MMHG
MV PEAK E VEL: 1.21 M/S
MV PEAK GRADIENT: 5 MMHG
MV STENOSIS PRESSURE HALF TIME: 82 MS
MV VALVE AREA BY CONTINUITY EQUATION: 1.98 CM2
MV VALVE AREA P 1/2 METHOD: 2.68 CM2
NEUTROPHILS # BLD AUTO: 2.1 K/UL (ref 1.8–7.7)
NEUTROPHILS NFR BLD: 39.9 % (ref 38–73)
NRBC BLD-RTO: 0 /100 WBC
OHS LV EJECTION FRACTION SIMPSONS BIPLANE MOD: 54 %
PHOSPHATE SERPL-MCNC: 2.8 MG/DL (ref 2.7–4.5)
PISA MRMAX VEL: 5.17 M/S
PLATELET # BLD AUTO: 216 K/UL (ref 150–450)
PMV BLD AUTO: 10.5 FL (ref 9.2–12.9)
POTASSIUM SERPL-SCNC: 3.7 MMOL/L (ref 3.5–5.1)
PROT SERPL-MCNC: 6.5 G/DL (ref 6–8.4)
PROTHROMBIN TIME: 10.8 SEC (ref 9–12.5)
PV MV: 0.7 M/S
PV PEAK GRADIENT: 4 MMHG
PV PEAK VELOCITY: 1.01 M/S
RA PRESSURE ESTIMATED: 3 MMHG
RBC # BLD AUTO: 4.79 M/UL (ref 4–5.4)
RIGHT ATRIUM VOLUME AREA LENGTH APICAL 4 CHAMBER: 44.5 ML
RIGHT VENTRICULAR END-DIASTOLIC DIMENSION: 2.1 CM
RV TISSUE DOPPLER FREE WALL SYSTOLIC VELOCITY 1 (APICAL 4 CHAMBER VIEW): 13.4 CM/S
SODIUM SERPL-SCNC: 143 MMOL/L (ref 136–145)
TDI LATERAL: 0.15 M/S
TDI SEPTAL: 0.09 M/S
TDI: 0.12 M/S
TRICUSPID ANNULAR PLANE SYSTOLIC EXCURSION: 1.42 CM
TROPONIN I SERPL HS-MCNC: 33.5 PG/ML (ref 0–14.9)
WBC # BLD AUTO: 5.24 K/UL (ref 3.9–12.7)
Z-SCORE OF LEFT VENTRICULAR DIMENSION IN END DIASTOLE: -3.32
Z-SCORE OF LEFT VENTRICULAR DIMENSION IN END SYSTOLE: -1.51

## 2024-12-07 PROCEDURE — 93306 TTE W/DOPPLER COMPLETE: CPT

## 2024-12-07 PROCEDURE — 99900031 HC PATIENT EDUCATION (STAT)

## 2024-12-07 PROCEDURE — 85025 COMPLETE CBC W/AUTO DIFF WBC: CPT | Performed by: NURSE PRACTITIONER

## 2024-12-07 PROCEDURE — 25000003 PHARM REV CODE 250: Performed by: NURSE PRACTITIONER

## 2024-12-07 PROCEDURE — 85730 THROMBOPLASTIN TIME PARTIAL: CPT | Performed by: NURSE PRACTITIONER

## 2024-12-07 PROCEDURE — 83036 HEMOGLOBIN GLYCOSYLATED A1C: CPT | Performed by: NURSE PRACTITIONER

## 2024-12-07 PROCEDURE — 82553 CREATINE MB FRACTION: CPT | Performed by: NURSE PRACTITIONER

## 2024-12-07 PROCEDURE — 80053 COMPREHEN METABOLIC PANEL: CPT | Performed by: NURSE PRACTITIONER

## 2024-12-07 PROCEDURE — 85610 PROTHROMBIN TIME: CPT | Performed by: NURSE PRACTITIONER

## 2024-12-07 PROCEDURE — 83735 ASSAY OF MAGNESIUM: CPT | Performed by: NURSE PRACTITIONER

## 2024-12-07 PROCEDURE — 93306 TTE W/DOPPLER COMPLETE: CPT | Mod: 26,,, | Performed by: INTERNAL MEDICINE

## 2024-12-07 PROCEDURE — G0378 HOSPITAL OBSERVATION PER HR: HCPCS

## 2024-12-07 PROCEDURE — 84100 ASSAY OF PHOSPHORUS: CPT | Performed by: NURSE PRACTITIONER

## 2024-12-07 PROCEDURE — 84484 ASSAY OF TROPONIN QUANT: CPT | Performed by: NURSE PRACTITIONER

## 2024-12-07 PROCEDURE — 97161 PT EVAL LOW COMPLEX 20 MIN: CPT

## 2024-12-07 PROCEDURE — 94761 N-INVAS EAR/PLS OXIMETRY MLT: CPT

## 2024-12-07 PROCEDURE — 25000003 PHARM REV CODE 250: Performed by: INTERNAL MEDICINE

## 2024-12-07 PROCEDURE — 97165 OT EVAL LOW COMPLEX 30 MIN: CPT

## 2024-12-07 RX ORDER — NEBIVOLOL 10 MG/1
10 TABLET ORAL DAILY
Qty: 30 TABLET | Refills: 11 | Status: SHIPPED | OUTPATIENT
Start: 2024-12-07 | End: 2025-12-07

## 2024-12-07 RX ORDER — NAPROXEN SODIUM 220 MG/1
81 TABLET, FILM COATED ORAL DAILY
Qty: 30 TABLET | Refills: 0 | Status: SHIPPED | OUTPATIENT
Start: 2024-12-08 | End: 2024-12-07 | Stop reason: HOSPADM

## 2024-12-07 RX ORDER — ATORVASTATIN CALCIUM 40 MG/1
40 TABLET, FILM COATED ORAL DAILY
Qty: 30 TABLET | Refills: 0 | Status: SHIPPED | OUTPATIENT
Start: 2024-12-07 | End: 2025-01-06

## 2024-12-07 RX ADMIN — APIXABAN 5 MG: 5 TABLET, FILM COATED ORAL at 08:12

## 2024-12-07 RX ADMIN — PANTOPRAZOLE SODIUM 40 MG: 40 TABLET, DELAYED RELEASE ORAL at 06:12

## 2024-12-07 RX ADMIN — ASPIRIN 81 MG 81 MG: 81 TABLET ORAL at 08:12

## 2024-12-07 NOTE — PROGRESS NOTES
12/07/24 1153   Missed Time Reason   SLP Attempted Eval Date 12/07/24   SLP Attempted Eval Time 1153   Missed Treatment Reason Patient discharged prior to initiation of evaluation;Other (Comment)  (dc summary in chart; checked with nursing/md; eval no longer needed.)

## 2024-12-07 NOTE — HOSPITAL COURSE
Patient is a 58 year old female who was admitted with right arm weakness, found to have acute infarct on left frontal lobe on MRI. Her BP is controlled. Weakness is somewhat improved. Patient was started on Aspirin and Lipitor, in addition to Eliquis she is on. Echo with bubble study pending. Seen by neuro, recommended to cont Eliquis, no need aspirin

## 2024-12-07 NOTE — ASSESSMENT & PLAN NOTE
Patient has paroxysmal (<7 days) atrial fibrillation. Patient is currently in atrial fibrillation. EBDFD2DMLy Score: 1. The patients heart rate in the last 24 hours is as follows:  Pulse  Min: 65  Max: 103     Antiarrhythmics  nebivolol (BYSTOLIC) tablet, Daily, Oral  Continue nebivolol  Anticoagulants  apixaban tablet 5 mg, 2 times daily, Oral  restart eliquis

## 2024-12-07 NOTE — ASSESSMENT & PLAN NOTE
LKW 12/6/24 @1430,   No TNK candidate,   Symptoms lasting 1 min, presented with left facial droop, slurred speech, left arm weakness, left arm numbness, symptoms resolving  Continues to have left visual field cut deficit.   CT head negative  CTA head and neck with no LVO or significant stenosis  MRI shows acute infarct in the left frontal lobe  Final neuro recommendations: cont eliquis, Patient noncompliant   echo with bubble is pending.   BP controlled.

## 2024-12-07 NOTE — PLAN OF CARE
Patient cleared for discharge from case management standpoint.    Chart and discharge orders reviewed.  Patient discharged home with no further case management needs.        12/07/24 1230   Final Note   Assessment Type Final Discharge Note   Anticipated Discharge Disposition Home   What phone number can be called within the next 1-3 days to see how you are doing after discharge? [phone]   Post-Acute Status   Discharge Delays None known at this time

## 2024-12-07 NOTE — PLAN OF CARE
Problem: Adult Inpatient Plan of Care  Goal: Plan of Care Review  Outcome: Progressing  Goal: Patient-Specific Goal (Individualized)  Outcome: Progressing  Goal: Absence of Hospital-Acquired Illness or Injury  Outcome: Progressing  Goal: Optimal Comfort and Wellbeing  Outcome: Progressing  Goal: Readiness for Transition of Care  Outcome: Progressing     Problem: Infection  Goal: Absence of Infection Signs and Symptoms  Outcome: Progressing     Problem: Stroke, Ischemic (Includes Transient Ischemic Attack)  Goal: Optimal Coping  Outcome: Progressing  Goal: Effective Bowel Elimination  Outcome: Progressing  Goal: Optimal Cerebral Tissue Perfusion  Outcome: Progressing  Goal: Optimal Cognitive Function  Outcome: Progressing  Goal: Improved Communication Skills  Outcome: Progressing  Goal: Optimal Functional Ability  Outcome: Progressing  Goal: Optimal Nutrition Intake  Outcome: Progressing  Goal: Effective Oxygenation and Ventilation  Outcome: Progressing  Goal: Improved Sensorimotor Function  Outcome: Progressing  Goal: Safe and Effective Swallow  Outcome: Progressing  Goal: Effective Urinary Elimination  Outcome: Progressing     Problem: Fall Injury Risk  Goal: Absence of Fall and Fall-Related Injury  Outcome: Progressing

## 2024-12-07 NOTE — PT/OT/SLP EVAL
"Occupational Therapy   Evaluation and Discharge Note    Name: Martita San  MRN: [id number]  Admitting Diagnosis: Acute focal neurological deficit  Recent Surgery: * No surgery found *      Recommendations:     Discharge Recommendations: No Therapy Indicated  Discharge Equipment Recommendations: none  Barriers to discharge:  None    Assessment:     Martita San is a 58 y.o. female with a medical diagnosis of Acute focal neurological deficit. At this time, patient is functioning at their prior level of function and does not require further acute OT services.     Plan:     During this hospitalization, patient does not require further acute OT services.  Please re-consult if situation changes.    Plan of Care Reviewed with: patient    Subjective     Chief Complaint: She is ready to go home.  She is no longer having the symptoms that brought her to the hospital.  Patient/Family Comments/goals: go home    Occupational Profile:  Living Environment: She lives alone in a mobile home.  Her daughter is "1 minute away" and she has neighbors next door to assist when necessary.  Previous level of function: Independent with self care, driving and light housework   Roles and Routines: active, homemaker  Equipment Used at home: CPAP, cane, straight  Assistance upon Discharge: family and neighbors    Pain/Comfort:  Pain Rating 1: 4/10  Location - Side 1: Bilateral  Location - Orientation 1: posterior  Location 1: back  Pain Addressed 1: Reposition, Distraction    Patients cultural, spiritual, Jewish conflicts given the current situation: no    Objective:     Communicated with: nurse prior to session.  Patient found HOB elevated with peripheral IV, telemetry upon OT entry to room.    General Precautions: Standard,    Orthopedic Precautions: N/A  Braces: N/A  Respiratory Status: Room air     Occupational Performance:    Bed Mobility:    Patient completed Rolling/Turning to Right with supervision  Patient completed Scooting/Bridging " with supervision  Patient completed Supine to Sit with supervision  Patient completed Sit to Supine with supervision    Functional Mobility/Transfers:  Patient completed Sit <> Stand Transfer with supervision  with  no assistive device   Functional Mobility: She ambulated from the bed to the bathroom and back 15 feet without an assistive device and Supervision.    Cognitive/Visual Perceptual:  Cognitive/Psychosocial Skills:     -       Oriented to: Person, Place, Time, and Situation   -       Follows Commands/attention:Follows one-step commands  -       Communication: clear/fluent  -       Memory: No Deficits noted  -       Safety awareness/insight to disability: intact   -       Mood/Affect/Coping skills/emotional control: Appropriate to situation    Physical Exam:  Balance:    -       static sitting balance- good  Dominant hand:    -       right  Upper Extremity Range of Motion:     -       Right Upper Extremity: WNL  -       Left Upper Extremity: WNL  Upper Extremity Strength:    -       Right Upper Extremity: WFL  -       Left Upper Extremity: WFL   Strength:    -       Right Upper Extremity: WFL  -       Left Upper Extremity: WFL  Fine Motor Coordination:    -       Intact  Gross motor coordination:   WFL    AMPAC 6 Click ADL:  AMPAC Total Score: 24    Treatment & Education:  She was educated on Role of Occupational Therapy, goals and plan of care.  Discussed importance of OOB activity and functional mobility to negate the negative effects of prolonged bedrest during this hospitalization, safe transfers and d/c planning recommendations.     Patient left HOB elevated with all lines intact, call button in reach, and bed alarm on    GOALS:   Multidisciplinary Problems       Occupational Therapy Goals       Not on file                    History:     Past Medical History:   Diagnosis Date    Allergy     Anxiety     Carotid stenosis 08/2016    Chronic anemia     Chronic kidney disease, stage 3a 07/18/2024     COPD (chronic obstructive pulmonary disease)     Depression     Fibromyalgia     Osteoarthritis     Pinched nerve     PTSD (post-traumatic stress disorder)     Tachycardia     Thyroid disease     Torn rotator cuff     Uterine fibroid 10/2016    Vertigo          Past Surgical History:   Procedure Laterality Date     SECTION      HYSTERECTOMY      LAMINECTOMY      TONSILLECTOMY         Time Tracking:     OT Date of Treatment: 24  OT Start Time: 1046  OT Stop Time: 1056  OT Total Time (min): 10 min    Billable Minutes:Evaluation 10    2024

## 2024-12-07 NOTE — NURSING
Patient discharged home, daughter at bedside. Patient and daughter verbalized understanding if discharge instructions. IV d/c'd and telemetry d/c'd. Adela Varma RN

## 2024-12-07 NOTE — ASSESSMENT & PLAN NOTE
LKW 12/6/24 @1430,   No TNK candidate,   Symptoms lasting 1 min, presented with left facial droop, slurred speech, left arm weakness, left arm numbness, symptoms resolving  Continues to have left visual field cut deficit.   CT head negative  CTA head and neck with no LVO or significant stenosis  Begin aspirin/statin  MRI shows acute infarct in the left frontal lobe  Final neuro recommendations and echo with bubble is pending.   BP controlled.

## 2024-12-07 NOTE — PT/OT/SLP EVAL
Physical Therapy Evaluation and Discharge Note    Patient Name:  Martita San   MRN:  [id number]    Recommendations:     Discharge Recommendations: No Therapy Indicated  Discharge Equipment Recommendations: none   Barriers to discharge: None    Assessment:     Martita San is a 58 y.o. female admitted with a medical diagnosis of Acute focal neurological deficit. .  At this time, patient is functioning at their prior level of function and does not require further acute PT services.     Recent Surgery: * No surgery found *      Plan:     During this hospitalization, patient does not require further acute PT services.  Please re-consult if situation changes.      Subjective     Chief Complaint: low back pain  Patient/Family Comments/goals: States that she's  back to how she was prior level of function.  Pain/Comfort:  Pain Rating 1: 5/10  Location - Orientation 1: lower  Location 1: back  Pain Addressed 1: Distraction, Reposition  Pain Rating Post-Intervention 1: 4/10    Patients cultural, spiritual, Druze conflicts given the current situation:      Living Environment:  Lives alone ; 5 step entry  Prior to admission, patients level of function was independent.  Equipment used at home: cane, straight.  DME owned (not currently used): none.    Objective:     Communicated with nurse prior to session.  Patient found supine with   upon PT entry to room.    General Precautions: Standard,      Orthopedic Precautions:N/A   Braces: N/A  Respiratory Status: Room air    Exams:  Cognitive Exam:  Patient is oriented to Person, Place, Time, and Situation  RUE ROM: WFL  RUE Strength: WFL  LUE ROM: WFL  LUE Strength: WFL  RLE ROM: WFL  RLE Strength: WFL  LLE ROM: WFL  LLE Strength: WFL    Functional Mobility:  Bed Mobility:     Supine to Sit: independence  Sit to Supine: independence  Transfers:     Sit to Stand:  independence with no AD  Bed to Chair: independence with  no AD  using  Step Transfer  Gait: ambulates without   ft independent  Balance: good sitting standing/sitting dynamics    AM-PAC 6 CLICK MOBILITY  Total Score:24       Treatment and Education:  Patient seen for initial evaluation. Ambulated 250 ft. No limitations or motor deficits    AM-PAC 6 CLICK MOBILITY  Total Score:24     Patient left supine with call button in reach.    GOALS:   Multidisciplinary Problems       Physical Therapy Goals       Not on file                    History:     Past Medical History:   Diagnosis Date    Allergy     Anxiety     Carotid stenosis 2016    Chronic anemia     Chronic kidney disease, stage 3a 2024    COPD (chronic obstructive pulmonary disease)     Depression     Fibromyalgia     Osteoarthritis     Pinched nerve     PTSD (post-traumatic stress disorder)     Tachycardia     Thyroid disease     Torn rotator cuff     Uterine fibroid 10/2016    Vertigo        Past Surgical History:   Procedure Laterality Date     SECTION      HYSTERECTOMY      LAMINECTOMY      TONSILLECTOMY         Time Tracking:     PT Received On: 24  PT Start Time: 941     PT Stop Time: 956  PT Total Time (min): 15 min     Billable Minutes: Evaluation 15      2024

## 2024-12-07 NOTE — CARE UPDATE
12/07/24 0833   Patient Assessment/Suction   Level of Consciousness (AVPU) alert   Respiratory Effort Normal;Unlabored   Expansion/Accessory Muscles/Retractions no use of accessory muscles;no retractions;expansion symmetric   All Lung Fields Breath Sounds clear   Rhythm/Pattern, Respiratory unlabored;pattern regular;depth regular   PRE-TX-O2   Device (Oxygen Therapy) room air   SpO2 (!) 9 %   Pulse Oximetry Type Intermittent   $ Pulse Oximetry - Multiple Charge Pulse Oximetry - Multiple   Pulse 71   Education   $ Education 15 min;Oxygen

## 2024-12-07 NOTE — DISCHARGE SUMMARY
[facility] Medicine  Discharge Summary      Patient Name: Martita San  MRN: [id number]  CORAL: [id number]  Patient Class: OP- Observation  Admission Date: 12/6/2024  Hospital Length of Stay: 0 days  Discharge Date and Time:  12/07/2024 11:31 AM  Attending Physician: Jeremi Fernandez MD   Discharging Provider: Jeremi Fernandez MD  Primary Care Provider: Amy Adam MD    Primary Care Team: Networked reference to record PCT     HPI:   58-year-old female with a past medical history of COPD, hypertension, chronic kidney disease, thyroid disease, carotid stenosis, current tobacco smoker long-term anticoagulation use with Eliquis, fibromyalgia, PTSD, depression who presented to the emergency room with reports of Right arm weakness, difficulty speaking, and facial droop that started at 2:30 p.m. today.  She reports it lasted about a minute and then completely resolved.  Patient reports noncompliance with her Eliquis doses and her last dose was several days ago.  In the ER, CBC unremarkable, CMP with a glucose of 130 K 3.7, LDL 80 triglycerides 180 TSH 0.778, UA pending  CTA head and neck with no large vessel occlusion no significant stenosis, CT head with no acute intracranial process.  EKG with atrial fibribillation  LKW 12/6/24 @ 1425, symptoms completely resolved within a minute telemedicine vascular neurology consulted  Not a TNK candidate due to symptoms resolving  Admit to hospital medicine for TIA versus stroke workup.              * No surgery found *      Hospital Course:   Patient is a 58 year old female who was admitted with right arm weakness, found to have acute infarct on left frontal lobe on MRI. Her BP is controlled. Weakness is somewhat improved. Patient was started on Aspirin and Lipitor, in addition to Eliquis she is on. Echo with bubble study pending. Seen by neuro, recommended to cont Eliquis, no need aspirin      Goals of Care Treatment Preferences:  Code  Status: Full Code    Physical Exam  Vitals and nursing note reviewed.   Constitutional:       General: She is not in acute distress.     Appearance: She is not ill-appearing.   HENT:      Head: Normocephalic and atraumatic.      Mouth/Throat:      Mouth: Mucous membranes are moist.   Eyes:      General:      Extraocular Movements: Extraocular movements intact.      Pupils: Pupils are equal, round, and reactive to light.   Cardiovascular:      Rate and Rhythm: Rhythm irregularly irregular.      Heart sounds: No murmur heard.  Pulmonary:      Effort: Pulmonary effort is normal. No respiratory distress.      Breath sounds: Normal breath sounds. No wheezing.   Abdominal:      General: Bowel sounds are normal. There is no distension.      Palpations: Abdomen is soft.      Tenderness: There is no abdominal tenderness.   Musculoskeletal:      Right lower leg: No edema.      Left lower leg: No edema.   Skin:     General: Skin is warm and dry.      Capillary Refill: Capillary refill takes less than 2 seconds.   Neurological:      Mental Status: She is alert and oriented to person, place, and time.      GCS: GCS eye subscore is 4. GCS verbal subscore is 5. GCS motor subscore is 6.      Comments: Left eye with visual field deficit. Loss of peripheral vision, right upper extremity 4/5 power. No facial droop was noted this morning.   Psychiatric:         Mood and Affect: Mood normal  SDOH Screening:  The patient was screened for utility difficulties, food insecurity, transport difficulties, housing insecurity, and interpersonal safety and there were no concerns identified this admission.     Consults:   Consults (From admission, onward)          Status Ordering Provider     Inpatient consult to Neurology  Once        Provider:  Frank Villalobos MD    Acknowledged TINO GROSS consult to case management/social work  Once        Provider:  (Not yet assigned)    Acknowledged DAMIR AGRAWAL     Consult to Telemedicine -  Acute Stroke  Once        Provider:  (Not yet assigned)    Acknowledged CHRISTIANO FRIAS            * Acute focal neurological deficit  LKW 12/6/24 @1430,   No TNK candidate,   Symptoms lasting 1 min, presented with left facial droop, slurred speech, left arm weakness, left arm numbness, symptoms resolving  Continues to have left visual field cut deficit.   CT head negative  CTA head and neck with no LVO or significant stenosis  MRI shows acute infarct in the left frontal lobe  Final neuro recommendations: cont eliquis, Patient noncompliant   echo with bubble is pending.   BP controlled.           Atrial fibrillation  Patient has paroxysmal (<7 days) atrial fibrillation. Patient is currently in atrial fibrillation. [id number] Score: 1. The patients heart rate in the last 24 hours is as follows:  Pulse  Min: 65  Max: 103     Antiarrhythmics  nebivolol (BYSTOLIC) tablet, Daily, Oral  Continue nebivolol  Anticoagulants  apixaban tablet 5 mg, 2 times daily, Oral  restart eliquis              Final Active Diagnoses:    Diagnosis Date Noted POA    PRINCIPAL PROBLEM:  Acute focal neurological deficit [R29.818] 12/06/2024 Yes    Atrial fibrillation [I48.91] 10/19/2017 Yes      Problems Resolved During this Admission:       Discharged Condition: good    Disposition: Home or Self Care    Follow Up:    Patient Instructions:   No discharge procedures on file.    Significant Diagnostic Studies: Labs: CMP   Recent Labs   Lab 12/06/24  1521 12/07/24  0548    143   K 3.7 3.7    104   CO2 30* 33*   * 86   BUN 9 7   CREATININE 0.9 0.9   CALCIUM 9.1 9.5   PROT 6.8 6.5   ALBUMIN 4.2 4.0   BILITOT 0.4 0.5   ALKPHOS 52* 59   AST 16 13   ALT 13 11   ANIONGAP 6* 6*    and CBC   Recent Labs   Lab 12/06/24  1521 12/07/24  0548   WBC 7.71 5.24   HGB 14.5 14.8   HCT 44.4 45.2    216       Pending Diagnostic Studies:       Procedure Component Value Units Date/Time    Echo Saline Bubble? Yes [[id number]]  (Abnormal)  Resulted: 12/07/24 1101    Order Status: Sent Lab Status: In process Updated: 12/07/24 1101     BSA 2.01 m2      Velasco's Biplane MOD Ejection Fraction 54 %      A2C EF 44 %      A4C EF 64 %      LVOT stroke volume 51.6 cm3      LVIDd 4.1 cm      LV Systolic Volume 32.20 mL      LV Systolic Volume Index 16.3 mL/m2      LVIDs 2.9 cm      LV Diastolic Volume 74.20 mL      LV ESV A4C 135.00 mL      LV Diastolic Volume Index 37.47 mL/m2      LV EDV A2C [id number] mL      LV EDV A4C 62.20 mL      Left Ventricular End Systolic Volume by Teichholz Method 32.20 mL      Left Ventricular End Diastolic Volume by Teichholz Method 74.20 mL      IVS 1.3 cm      LVOT diameter 2.1 cm      LVOT area 3.5 cm2      FS 29.3 %      Left Ventricle Relative Wall Thickness 0.54 cm      PW 1.1 cm      LV mass 171.7 g      LV Mass Index 86.7 g/m2      MV Peak E Syed 1.21 m/s      TDI LATERAL 0.15 m/s      TDI SEPTAL 0.09 m/s      E/E' ratio 10.08 m/s      E wave deceleration time 225.00 msec      LV SEPTAL E/E' RATIO 13.44 m/s      LV LATERAL E/E' RATIO 8.07 m/s      LVOT peak syed 0.9 m/s      Left Ventricular Outflow Tract Mean Velocity 0.60 cm/s      Left Ventricular Outflow Tract Mean Gradient 2.00 mmHg      RV S' 13.40 cm/s      TAPSE 1.42 cm      LA size 4.40 cm      RA area length vol 44.50 mL      AV mean gradient 2.0 mmHg      AV peak gradient 3.2 mmHg      Ao peak syed 0.9 m/s      Ao VTI 17.6 cm      LVOT peak VTI 14.9 cm      AV valve area 2.9 cm²      AV Velocity Ratio 1.00     AV index (prosthetic) 0.85     ASAF by Velocity Ratio 3.5 cm²      Mr max syed 5.17 m/s      MV mean gradient 2 mmHg      MV peak gradient 5 mmHg      MV stenosis pressure 1/2 time 82.00 ms      MV valve area p 1/2 method 2.68 cm2      MV valve area by continuity eq 1.98 cm2      MV VTI 26.1 cm      PV PEAK VELOCITY 1.01 m/s      PV peak gradient 4 mmHg      Pulmonary Valve Mean Velocity 0.70 m/s      Ao root annulus 2.90 cm      Ascending aorta 3.40  cm      IVC diameter 1.60 cm      Mean e' 0.12 m/s      ZLVIDS -1.51     ZLVIDD -3.32     LA area A4C 36.80 cm2      RVDD 2.10 cm      AORTIC VALVE CUSP SEPERATION 2.00 cm            Medications:  Reconciled Home Medications:      Medication List        START taking these medications      atorvastatin 40 MG tablet  Commonly known as: LIPITOR  Take 1 tablet (40 mg total) by mouth once daily.            CHANGE how you take these medications      azelastine 137 mcg (0.1 %) nasal spray  Commonly known as: ASTELIN  2 sprays into each side of nose before bed every night, may use in AM also  What changed:   how much to take  how to take this  when to take this     lisinopriL 40 MG tablet  Commonly known as: PRINIVIL,ZESTRIL  Take 1 tablet (40 mg total) by mouth once daily.  What changed: when to take this     NYSTOP powder  Generic drug: nystatin  Apply topically 2 (two) times daily.  What changed: how much to take     omeprazole 40 MG capsule  Commonly known as: PRILOSEC  TAKE 1 CAPSULE BY MOUTH TWICE DAILY BEFORE MEAL(S)  What changed: See the new instructions.            CONTINUE taking these medications      albuterol 90 mcg/actuation inhaler  Commonly known as: PROVENTIL/VENTOLIN HFA  Inhale 1-2 puffs into the lungs every 6 (six) hours as needed for Wheezing. Rescue     albuterol-ipratropium 2.5 mg-0.5 mg/3 mL nebulizer solution  Commonly known as: DUO-NEB  Take 3 mLs by nebulization every 6 (six) hours as needed for Wheezing or Shortness of Breath. Rescue     amLODIPine 10 MG tablet  Commonly known as: NORVASC  Take 1 tablet (10 mg total) by mouth once daily.     apixaban 5 mg Tab  Commonly known as: ELIQUIS  Take 1 tablet (5 mg total) by mouth 2 (two) times daily.     fluticasone propionate 50 mcg/actuation nasal spray  Commonly known as: FLONASE  2 sprays (100 mcg total) by Each Nostril route every evening. 2 sprays into each nostril before bed every night     nebivoloL 10 MG Tab  Commonly known as: BYSTOLIC  Take  1 tablet (10 mg total) by mouth once daily.            STOP taking these medications      furosemide 20 MG tablet  Commonly known as: LASIX     hydrALAZINE 25 MG tablet  Commonly known as: APRESOLINE              Indwelling Lines/Drains at time of discharge:   Lines/Drains/Airways       None                   Time spent on the discharge of patient: 40 minutes         eJremi Fernandez MD  Department of Hospital Medicine  [facility]

## 2024-12-07 NOTE — CONSULTS
Formerly [facility], later [facility]  Department of Neurology  Neurology Consultation Note        PATIENT NAME: Martita San  MRN: [id number]  CSN: [id number]      TODAY'S DATE: 12/07/2024  ADMIT DATE: 12/6/2024                            CONSULTING PROVIDER: Frank Villalobos MD  CONSULT REQUESTED BY: Jeremi Fernandez MD      Reason for consult: CVA      History obtained from chart review and the patient.    HPI per EMR: Martita San is a 58 y.o. female with a history of  COPD, hypertension, chronic kidney disease, thyroid disease, carotid stenosis, current tobacco smoker long-term anticoagulation use with Eliquis, fibromyalgia, PTSD, depression who presented to the emergency room with reports of Right arm weakness, difficulty speaking, and facial droop that started at 2:30 p.m. today.  She reports it lasted about a minute and then completely resolved.  Patient reports noncompliance with her Eliquis doses and her last dose was several days ago.  In the ER, CBC unremarkable, CMP with a glucose of 130 K 3.7, LDL 80 triglycerides 180 TSH 0.778, UA pending  CTA head and neck with no large vessel occlusion no significant stenosis, CT head with no acute intracranial process.  EKG with atrial fibribillation  LKW 12/6/24 @ 1425, symptoms completely resolved within a minute telemedicine vascular neurology consulted  Not a TNK candidate due to symptoms resolving  Admit to hospital medicine for TIA versus stroke workup.    Neurology consult:  Patient was seen and examined by me.  She presented to the hospital with right facial droop, aphasia, right upper extremity weakness started at about 2:30 p.m. yesterday.  Symptoms lasted for about 1-2 minutes and improved.  She has a history of AFib and is supposed to be on Eliquis however she is not compliant with medications.  She states that she gets bruising on her upper extremities and hence does not take Eliquis regularly.  She has not taken Eliquis for the last 1 week and even prior to  that, she takes it intermittently.    Currently her symptoms have improved and she feels back to baseline.  She does smoke cigarettes every day.  CT head was done in the ER showed no acute pathology and CT angiogram head and neck showed no large vessel occlusion or high-grade stenosis.  Since symptoms improved, tPA was not given.  She was admitted to the hospital for further stroke workup and management.    PREVIOUS MEDICAL HISTORY:  Past Medical History:   Diagnosis Date    Allergy     Anxiety     Carotid stenosis 2016    Chronic anemia     Chronic kidney disease, stage 3a 2024    COPD (chronic obstructive pulmonary disease)     Depression     Fibromyalgia     Osteoarthritis     Pinched nerve     PTSD (post-traumatic stress disorder)     Tachycardia     Thyroid disease     Torn rotator cuff     Uterine fibroid 10/2016    Vertigo      PREVIOUS SURGICAL HISTORY:  Past Surgical History:   Procedure Laterality Date     SECTION      HYSTERECTOMY      LAMINECTOMY      TONSILLECTOMY       FAMILY MEDICAL HISTORY:  Family History   Problem Relation Name Age of Onset    Arthritis Mother      Hypertension Mother      COPD Mother      Depression Mother      Arthritis Father      Depression Father      Hypertension Father      Heart disease Father      COPD Father      Heart attack Father      Asthma Father      Heart attack Maternal Grandmother      Cancer Maternal Grandmother      Hypertension Maternal Grandmother      Hypertension Paternal Grandmother      Stroke Paternal Grandmother      Tuberculosis Paternal Grandfather       ALLERGIES:  Review of patient's allergies indicates:   Allergen Reactions    Clindamycin Hives    Pcn [penicillins] Anaphylaxis    Diphenhydramine hcl Hives    Influenza virus vaccines     Oxycodone     Adhesive Rash    Metoprolol Rash     HOME MEDICATIONS:  Prior to Admission medications    Medication Sig Start Date End Date Taking? Authorizing Provider   albuterol  (PROVENTIL/VENTOLIN HFA) 90 mcg/actuation inhaler Inhale 1-2 puffs into the lungs every 6 (six) hours as needed for Wheezing. Rescue 11/6/24  Yes Amy Adam MD   albuterol-ipratropium (DUO-NEB) 2.5 mg-0.5 mg/3 mL nebulizer solution Take 3 mLs by nebulization every 6 (six) hours as needed for Wheezing or Shortness of Breath. Rescue 7/19/24 7/19/25 Yes Judith Trent, NP   amLODIPine (NORVASC) 10 MG tablet Take 1 tablet (10 mg total) by mouth once daily. 7/18/24 7/18/25 Yes Amy Adam MD   apixaban (ELIQUIS) 5 mg Tab Take 1 tablet (5 mg total) by mouth 2 (two) times daily. 7/18/24 7/18/25 Yes Amy Adam MD   azelastine (ASTELIN) 137 mcg (0.1 %) nasal spray 2 sprays into each side of nose before bed every night, may use in AM also  Patient taking differently: 2 sprays by Nasal route every evening. 2 sprays into each side of nose before bed every night, may use in AM also 5/14/24  Yes Leoncio Guzman MD   fluticasone propionate (FLONASE) 50 mcg/actuation nasal spray 2 sprays (100 mcg total) by Each Nostril route every evening. 2 sprays into each nostril before bed every night 5/14/24 5/14/25 Yes Leoncio Guzman MD   lisinopriL (PRINIVIL,ZESTRIL) 40 MG tablet Take 1 tablet (40 mg total) by mouth once daily.  Patient taking differently: Take 40 mg by mouth every evening. 7/18/24 7/18/25 Yes Amy Adam MD   NYSTOP powder Apply topically 2 (two) times daily. 11/6/24  Yes Amy Adam MD   omeprazole (PRILOSEC) 40 MG capsule TAKE 1 CAPSULE BY MOUTH TWICE DAILY BEFORE MEAL(S)  Patient taking differently: Take 40 mg by mouth 2 (two) times daily before meals. 10/18/24  Yes Leoncio Guzman MD   nebivoloL (BYSTOLIC) 10 MG Tab Take 1 tablet (10 mg total) by mouth 2 (two) times a day.  Patient taking differently: Take 10 mg by mouth once daily. 8/21/24 12/7/24 Yes Rahul Obrien MD   aspirin 81 MG Chew Take 1 tablet (81 mg total) by mouth once daily. 12/8/24 1/7/25   Jeremi Fernandez MD   atorvastatin (LIPITOR) 40 MG tablet Take 1 tablet (40 mg total) by mouth once daily. 12/7/24 1/6/25  Jeremi Fernandez MD   nebivoloL (BYSTOLIC) 10 MG Tab Take 1 tablet (10 mg total) by mouth once daily. 12/7/24 12/7/25  Jeremi Fernandez MD   furosemide (LASIX) 20 MG tablet Take 20 mg by mouth once daily.  12/7/24  Provider, Historical   hydrALAZINE (APRESOLINE) 25 MG tablet Take 25 mg by mouth 3 (three) times daily.  12/7/24  Provider, Historical     CURRENT SCHEDULED MEDICATIONS:   apixaban  5 mg Oral BID    aspirin  81 mg Oral Daily    atorvastatin  40 mg Oral Daily    azelastine  2 spray Nasal QHS    fluticasone propionate  2 spray Each Nostril QHS    pantoprazole  40 mg Oral Daily     CURRENT INFUSIONS:    CURRENT PRN MEDICATIONS:    Current Facility-Administered Medications:     acetaminophen, 650 mg, Oral, Q6H PRN    bisacodyL, 10 mg, Rectal, Daily PRN    labetalol, 10 mg, Intravenous, Q15 Min PRN    magnesium oxide, 800 mg, Oral, PRN    magnesium oxide, 800 mg, Oral, PRN    ondansetron, 4 mg, Intravenous, Q6H PRN    potassium bicarbonate, 35 mEq, Oral, PRN    potassium bicarbonate, 50 mEq, Oral, PRN    potassium bicarbonate, 60 mEq, Oral, PRN    potassium, sodium phosphates, 2 packet, Oral, PRN    potassium, sodium phosphates, 2 packet, Oral, PRN    potassium, sodium phosphates, 2 packet, Oral, PRN    sodium chloride 0.9%, 500 mL, Intravenous, PRN    sodium chloride 0.9%, 10 mL, Intravenous, PRN    REVIEW OF SYSTEMS:  Please refer to the HPI for all pertinent positive and negative findings. A comprehensive review of all other systems was negative.    PHYSICAL EXAM:  Patient Vitals for the past 24 hrs:   BP Temp Temp src Pulse Resp SpO2 Height Weight   12/07/24 0833 -- -- -- 71 -- (!) 9 % -- --   12/07/24 0803 (!) 146/92 97.8 °F (36.6 °C) Oral 72 17 (!) 93 % -- --   12/07/24 0416 (!) 142/91 97.3 °F (36.3 °C) Temporal 65 17 97 % -- --   12/07/24 4790 -- -- -- 78 -- -- -- --  "  12/06/24 2349 (!) 141/99 97.7 °F (36.5 °C) Temporal 78 17 98 % 5' 7" (1.702 m) 85.9 kg (189 lb 6 oz)   12/06/24 2345 -- -- -- -- -- -- 5' 7" (1.702 m) 85.9 kg (189 lb 6 oz)   12/06/24 2200 138/81 97.7 °F (36.5 °C) Oral 78 16 97 % -- --   12/06/24 2100 127/86 -- -- 78 17 95 % -- --   12/06/24 1513 (!) 134/93 -- -- 103 18 96 % -- 85.7 kg (189 lb)       GENERAL APPEARANCE: Alert, well-developed, well-nourished female in no acute distress.  HEENT: Normocephalic and atraumatic. PERRL. Oropharynx unremarkable.  PULM: Normal respiratory effort. No accessory muscle use.  CV: RRR.  ABDOMEN: Soft, nontender.  EXTREMITIES: No obvious signs of vascular compromise. Pulses present. No cyanosis, clubbing or edema.  SKIN: Clear; no rashes, lesions or skin breaks in exposed areas.    NEURO:  MENTAL STATUS: Patient awake and oriented to time, place, and person, recent/remote memory normal, attention span/concentration normal, and speech fluent without paraphasic errors.  Affect euthymic.    CRANIAL NERVES:  CN I: Not tested.  CN II: Fundoscopic exam deferred.  CN III, IV, VI: Pupils equal, round and reactive to light.  Extraocular movements full and intact.  CN V: Facial sensation normal.  CN VII: Facial asymmetry absent.  CN VIII: Hearing grossly normal and equal bilaterally.  No skew deviation or pathologic nystagmus.  CN IX, X: Palate elevates symmetrically. Speech/articulation is clear without dysarthria.  CN XI: Shoulder shrug and chin rotation equal with good strength.  CN XII: Tongue protrusion midline.    MOTOR:  Bulk normal. Tone normal and symmetric throughout.  Abnormal movements absent.  Tremor: none present.  Strength 5/5 throughout.    REFLEXES:  DTRs 1+ throughout.  Plantar response equivocal bilaterally.  SENSATION: grossly intact throughout.  COORDINATION: normal finger-to-nose.  STATION: not tested.  GAIT: not tested.      NIHSS:  1a      Level of Consciousness (alert, drowsy, etc.):   0=alert; keenly " responsive  1b.     Level of Consciousness Questions (month, age): 0= able to answer both questions  1c.     Level of Consciousness Commands (open, close eyes, make fist, let go):  0=Answers both tasks correctly  2.      Best Gaze (eyes open - patient follows examiner's finger or face):      0=normal  3.      Visual (introduce visual stimulus/threat to patient's visual field quadrants):  0=No visual loss  4.      Facial Palsy (show teeth, raise eyebrows and squeeze eyes shut):        0=Normal symmetric movement  5a.     Motor Arm - Left (elevate extremity 90 degrees and score drift/movement):       0=No drift, limb holds 90 (or 45) degrees for full 10 seconds  5b.     Motor Arm - Right (elevate extremity 90 degrees and score drift/movement):      0=No drift, limb holds 90 (or 45) degrees for full 10 seconds  6a.     Motor Leg - Left (elevate extremity 30 degrees and score drift/movement):       0=No drift, limb holds 90 (or 45) degrees for full 10 seconds  6b      Motor Leg - Right (elevate extremity 30 degrees and score drift/movement):      0=No drift, limb holds 90 (or 45) degrees for full 10 seconds  7.      Limb Ataxia (finger-nose, heel down shin):      0=Absent  8.      Sensory (pin prick to face, arm, trunk, and leg - compare side to side):        0=Normal; no sensory loss  9.      Best Language (name items, describe a picture and read sentences):      0=No aphasia, normal  10.     Dysarthria (evaluate speech clarity by patient repeating listed words): 0=Normal  11.     Extinction and Inattention (use prior testing to identify neglect or double simultaneous stimuli testing):      0=No abnormality          NIH Stroke Scale Total:         0      Labs:  Recent Labs   Lab 12/06/24  1521 12/07/24  0548    143   K 3.7 3.7    104   CO2 30* 33*   BUN 9 7   CREATININE 0.9 0.9   * 86   CALCIUM 9.1 9.5   PHOS  --  2.8   MG  --  2.0     Recent Labs   Lab 12/06/24  1521 12/07/24  0548   WBC 7.71 5.24  "  HGB 14.5 14.8   HCT 44.4 45.2    216     Recent Labs   Lab 12/06/24  1521 12/07/24  0548   ALBUMIN 4.2 4.0   PROT 6.8 6.5   BILITOT 0.4 0.5   ALKPHOS 52* 59   ALT 13 11   AST 16 13     Lab Results   Component Value Date    INR 1.0 12/07/2024     Lab Results   Component Value Date    TRIG 180 (H) 12/06/2024    HDL 47 12/06/2024    CHOLHDL 28.8 12/06/2024     Lab Results   Component Value Date    HGBA1C 5.5 12/07/2024     No results found for: "PROTEINCSF", "GLUCCSF", "WBCCSF"    Imaging:  I have reviewed and interpreted the pertinent imaging and lab results.      MRI Brain Without Contrast  Narrative: EXAMINATION:  MRI BRAIN WITHOUT CONTRAST    CLINICAL HISTORY:  Transient ischemic attack (TIA);    TECHNIQUE:  Multiplanar multisequence MR imaging of the brain was performed without contrast.    COMPARISON:  CT scan of the head dated 12/06/2024.    FINDINGS:  The craniocervical junction is intact.  The sellar and parasellar structures are unremarkable.  The intracranial flow voids are within normal limits.    There is small focus of diffusion restriction within the left frontal lobe within the region of the precentral gyrus.  The ventricles and sulci are within normal limits.  There are no extra-axial fluid collections.  There is no evidence of intracranial hemorrhage.  There is no evidence of mass effect.    The orbits and intraorbital contents are within normal limits.  There is mucosal thickening within the ethmoid and maxillary sinuses.  The mastoid air cells are clear.  Impression: Small focus of diffusion restriction within the left frontal lobe, consistent with acute infarction.  No evidence of hemorrhagic conversion.    Case discussed with DALI Hawthorne on 12/06/2024 at 21:13.    Electronically signed by: Kalyan Alvares MD  Date:    12/06/2024  Time:    21:14  CTA Head and Neck (xpd)  Narrative: EXAMINATION:  CTA HEAD AND NECK (XPD)    CLINICAL HISTORY:  Transient ischemic attack (TIA);    TECHNIQUE:  Non " contrast low dose axial images were obtained through the head. CT angiogram was performed from the level of the darcy to the top of the head following the IV administration of 100mL of Omnipaque 350.   Sagittal and coronal reconstructions and maximum intensity projection reconstructions were performed. Arterial stenosis percentages are based on NASCET measurement criteria.    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    Subclavian arteries: The subclavian arteries are without hemodynamically significant stenosis or occlusion.    Right carotid: The right common carotid artery is without significant stenosis.   The right internal carotid artery is without significant stenosis, occlusion, or dissection.    Left carotid: The left common carotid artery is without significant stenosis.   The left internal carotid artery is without significant stenosis, occlusion, or dissection.    Extracranial vertebral arteries:  The vertebral arteries are without significant stenosis, occlusion, or dissection to the skull base.    Intracranial:    Anterior circulation: The course and caliber the internal carotid arteries are normal.  No areas of significant atherosclerotic narrowing or filling defects are identified.  The middle cerebral arteries are normal.  The anterior cerebral arteries are normal.    Posterior circulation: The vertebral arteries are normal. The basilar artery is normal. The posterior cerebral arteries are normal.    No evidence of aneurysm or arteriovenous malformation.    Dural venous sinuses are patent.    Other:    The paraspinous soft tissues are unremarkable.  There are mildly prominent cervical lymph nodes most likely reactive.  The orbits are unremarkable.    The visualized portions of the lung apices are unremarkable.    There are  degenerative spine changes. No concerning osseous lesions identified.  There is mucosal thickening the maxillary and ethmoid sinuses.  The remainder of the paranasal sinuses and mastoid air cells are clear.  Impression: Normal CTA of the head and neck    Electronically signed by: Tiara Cooper  Date:    12/06/2024  Time:    15:36  CT HEAD FOR STROKE  Narrative: CLINICAL HISTORY:  ([id number] y/o  (1965) F    Neuro deficit, acute, stroke suspected;    TECHNIQUE:  (A#[id number], exam time 12/6/2024 15:21)    CT HEAD FOR STROKE [id number]    Axial CT of the brain without contrast using soft tissue and bone algorithm. None.    CMS MANDATED QUALITY DATA - CT RADIATION - 436    All CT scans at this facility utilize dose modulation, iterative reconstruction, and/or weight based dosing when appropriate to reduce radiation dose to as low as reasonably achievable.    COMPARISON:  12/06/2018    FINDINGS:  The ventricles and sulci are normal in size and configuration for age.  There is no hemorrhage, mass or midline shift.  There are no extra-axial fluid collections.    The cerebellum and brainstem are normal.  The gray-white differentiation is maintained.  The orbits are unremarkable.    There is mucosal thickening in the maxillary and ethmoid sinuses.  The remainder of the paranasal sinuses are clear.  The calvarium is intact.  Impression: No acute intracranial process    These findings were called to the ordering physician at 15:25    Electronically signed by: Tiara Cooper  Date:    12/06/2024  Time:    15:27         ASSESSMENT & PLAN:    Acute ischemic stroke   Atrial fibrillation   Hypertension     Plan:   MRI brain showed left frontal acute infarct.  CTA was negative for large vessel occlusion or high-grade stenosis.    Etiology-cardioembolism in the setting of AFib and not being compliant with Eliquis.    Recommend continuing Eliquis 5 mg b.i.d. and Lipitor 80 mg nightly for stroke prevention.  Educated patient  about compliance of medication.    Risk factor stratification with 2D echo, hemoglobin A1c and lipid panel.    Permissive hypertension for 24 hours and then normalize blood pressure.    PT OT and speech therapy evaluate and treat.    discussed lifestyle modifications as prophylactic measures for stroke prevention including smoking cessation, adequate blood pressure management, healthy diet and regular exercise.  Outpatient neurology follow-up               Thank you kindly for including us in the care of this patient. Please do not hesitate to contact us with any questions.           Frank Villalobos MD  Neurology/vascular Neurology  Date of Service: 12/07/2024  11:35 AM    --------------------------------------------------------------------------------------------------------------------------------------------------------------------------------------------------------------------------------------------------------------  Please note: This note was transcribed using voice recognition software. Because of this technology there are often uinintended grammatical, spelling, and other transcription errors. Please disregard these errors.

## 2024-12-07 NOTE — H&P
MRI Brain: Small focus of diffusion restriction within the left frontal lobe, consistent with acute infarction. No evidence of hemorrhagic conversion     Informed patient of MRI brain findings, answered questioned, discussed secondary stroke prevention measures, importance of compliance with eliquis.           Former[facility] - Emergency Dept  Hospital Medicine  History & Physical    Patient Name: Martita San  MRN: [id number]  Patient Class: OP- Observation  Admission Date: 12/6/2024  Attending Physician: Jarrett Ramos MD   Primary Care Provider: Amy Adam MD         Patient information was obtained from patient, past medical records, and ER records.     Subjective:     Principal Problem:Acute focal neurological deficit    Chief Complaint:   Chief Complaint   Patient presents with    Aphasia     Pt states around 1430 she had an episode of right arm weakness and difficulty speaking. Symptoms have resolved upon ems arrival to ed        HPI: 58-year-old female with a past medical history of COPD, hypertension, chronic kidney disease, thyroid disease, carotid stenosis, current tobacco smoker long-term anticoagulation use with Eliquis, fibromyalgia, PTSD, depression who presented to the emergency room with reports of Right arm weakness, difficulty speaking, and facial droop that started at 2:30 p.m. today.  She reports it lasted about a minute and then completely resolved.  Patient reports noncompliance with her Eliquis doses and her last dose was several days ago.  In the ER, CBC unremarkable, CMP with a glucose of 130 K 3.7, LDL 80 triglycerides 180 TSH 0.778, UA pending  CTA head and neck with no large vessel occlusion no significant stenosis, CT head with no acute intracranial process.  EKG with atrial fibribillation  LKW 12/6/24 @ 1425, symptoms completely resolved within a minute telemedicine vascular neurology consulted  Not a TNK candidate due to symptoms resolving  Admit to hospital medicine  for TIA versus stroke workup.              Past Medical History:   Diagnosis Date    Allergy     Anxiety     Carotid stenosis 2016    Chronic anemia     Chronic kidney disease, stage 3a 2024    COPD (chronic obstructive pulmonary disease)     Depression     Fibromyalgia     Osteoarthritis     Pinched nerve     PTSD (post-traumatic stress disorder)     Tachycardia     Thyroid disease     Torn rotator cuff     Uterine fibroid 10/2016    Vertigo        Past Surgical History:   Procedure Laterality Date     SECTION      HYSTERECTOMY      LAMINECTOMY      TONSILLECTOMY         Review of patient's allergies indicates:   Allergen Reactions    Clindamycin Hives    Pcn [penicillins] Anaphylaxis    Diphenhydramine hcl Hives    Influenza virus vaccines     Oxycodone     Adhesive Rash    Metoprolol Rash       No current facility-administered medications on file prior to encounter.     Current Outpatient Medications on File Prior to Encounter   Medication Sig    albuterol (PROVENTIL/VENTOLIN HFA) 90 mcg/actuation inhaler Inhale 1-2 puffs into the lungs every 6 (six) hours as needed for Wheezing. Rescue    albuterol-ipratropium (DUO-NEB) 2.5 mg-0.5 mg/3 mL nebulizer solution Take 3 mLs by nebulization every 6 (six) hours as needed for Wheezing or Shortness of Breath. Rescue    amLODIPine (NORVASC) 10 MG tablet Take 1 tablet (10 mg total) by mouth once daily.    apixaban (ELIQUIS) 5 mg Tab Take 1 tablet (5 mg total) by mouth 2 (two) times daily.    azelastine (ASTELIN) 137 mcg (0.1 %) nasal spray 2 sprays into each side of nose before bed every night, may use in AM also    cloNIDine (CATAPRES) 0.1 MG tablet Take 0.1 mg by mouth 3 (three) times daily.    fluticasone propionate (FLONASE) 50 mcg/actuation nasal spray 2 sprays (100 mcg total) by Each Nostril route every evening. 2 sprays into each nostril before bed every night    lisinopriL (PRINIVIL,ZESTRIL) 40 MG tablet Take 1 tablet (40 mg total) by mouth once  daily.    nebivoloL (BYSTOLIC) 10 MG Tab Take 1 tablet (10 mg total) by mouth 2 (two) times a day. (Patient taking differently: Take 10 mg by mouth once daily.)    NYSTOP powder Apply topically 2 (two) times daily.    omeprazole (PRILOSEC) 40 MG capsule TAKE 1 CAPSULE BY MOUTH TWICE DAILY BEFORE MEAL(S)     Family History       Problem Relation (Age of Onset)    Arthritis Mother, Father    Asthma Father    COPD Mother, Father    Cancer Maternal Grandmother    Depression Mother, Father    Heart attack Father, Maternal Grandmother    Heart disease Father    Hypertension Mother, Father, Maternal Grandmother, Paternal Grandmother    Stroke Paternal Grandmother    Tuberculosis Paternal Grandfather          Tobacco Use    Smoking status: Never    Smokeless tobacco: Never   Substance and Sexual Activity    Alcohol use: No    Drug use: Yes     Types: Marijuana    Sexual activity: Not on file     Review of Systems   Constitutional:  Positive for activity change.   Eyes:  Positive for visual disturbance.   Respiratory: Negative.     Cardiovascular: Negative.    Gastrointestinal: Negative.    Genitourinary: Negative.    Musculoskeletal: Negative.    Skin: Negative.    Neurological:  Positive for facial asymmetry, speech difficulty, weakness and numbness.   Psychiatric/Behavioral: Negative.       Objective:     Vital Signs (Most Recent):  Pulse: 103 (12/06/24 1513)  Resp: 18 (12/06/24 1513)  BP: (!) 134/93 (12/06/24 1513)  SpO2: 96 % (12/06/24 1513) Vital Signs (24h Range):  Pulse:  [103] 103  Resp:  [18] 18  SpO2:  [96 %] 96 %  BP: (134)/(93) 134/93     Weight: 85.7 kg (189 lb)  Body mass index is 29.6 kg/m².     Physical Exam  Vitals and nursing note reviewed.   Constitutional:       General: She is not in acute distress.     Appearance: She is not ill-appearing.   HENT:      Head: Normocephalic and atraumatic.      Mouth/Throat:      Mouth: Mucous membranes are moist.   Eyes:      General: Visual field deficit present.       Extraocular Movements: Extraocular movements intact.      Pupils: Pupils are equal, round, and reactive to light.   Cardiovascular:      Rate and Rhythm: Tachycardia present. Rhythm irregularly irregular.      Heart sounds: No murmur heard.  Pulmonary:      Effort: Pulmonary effort is normal. No respiratory distress.      Breath sounds: Normal breath sounds. No wheezing.   Abdominal:      General: Bowel sounds are normal. There is no distension.      Palpations: Abdomen is soft.      Tenderness: There is no abdominal tenderness.   Musculoskeletal:      Right lower leg: No edema.      Left lower leg: No edema.   Skin:     General: Skin is warm and dry.      Capillary Refill: Capillary refill takes less than 2 seconds.   Neurological:      Mental Status: She is alert and oriented to person, place, and time.      GCS: GCS eye subscore is 4. GCS verbal subscore is 5. GCS motor subscore is 6.      Comments: Left eye with visual field deficit. Loss of peripheral vision   Psychiatric:         Mood and Affect: Mood normal.              CRANIAL NERVES     CN III, IV, VI   Pupils are equal, round, and reactive to light.       Significant Labs: All pertinent labs within the past 24 hours have been reviewed.  Recent Lab Results         12/06/24  1522   12/06/24  1521   12/06/24  1513        Albumin   4.2         ALP   52         ALT   13         Anion Gap   6         AST   16         Baso #   0.09         Basophil %   1.2         BILIRUBIN TOTAL   0.4  Comment: For infants and newborns, interpretation of results should be based  on gestational age, weight and in agreement with clinical  observations.    Premature Infant recommended reference ranges:  Up to 24 hours.............<8.0 mg/dL  Up to 48 hours............<12.0 mg/dL  3-5 days..................<15.0 mg/dL  6-29 days.................<15.0 mg/dL           BUN   9         Calcium   9.1         Chloride   105         Cholesterol Total   163  Comment: The National  Cholesterol Education Program (NCEP) has set the  following guidelines (reference ranges) for Cholesterol:  Optimal.....................<200 mg/dL  Borderline High.............200-239 mg/dL  High........................> or = 240 mg/dL           CO2   30         Creatinine   0.9         Differential Method   Automated         eGFR   >60.0         Eos #   0.9         Eos %   11.3         Glucose   130         Gran # (ANC)   3.7         Gran %   47.7         HDL   47  Comment: The National Cholesterol Education Program (NCEP) has set the  following guidelines (reference values) for HDL Cholesterol:  Low...............<40 mg/dL  Optimal...........>60 mg/dL           HDL/Cholesterol Ratio   28.8         Hematocrit   44.4         Hemoglobin   14.5         Immature Grans (Abs)   0.04  Comment: Mild elevation in immature granulocytes is non specific and   can be seen in a variety of conditions including stress response,   acute inflammation, trauma and pregnancy. Correlation with other   laboratory and clinical findings is essential.           Immature Granulocytes   0.5         INR   0.9  Comment: Coumadin Therapy:  2.0 - 3.0 for INR for all indicators except mechanical heart valves  and antiphospholipid syndromes which should use 2.5 - 3.5.           LDL Cholesterol   80.0  Comment: The National Cholesterol Education Program (NCEP) has set the  following guidelines (reference values) for LDL Cholesterol:  Optimal.......................<130 mg/dL  Borderline High...............130-159 mg/dL  High..........................160-189 mg/dL  Very High.....................>190 mg/dL           Lymph #   2.3         Lymph %   30.2         MCH   31.0         MCHC   32.7         MCV   95         Mono #   0.7         Mono %   9.1         MPV   9.9         Non-HDL Cholesterol   116  Comment: Risk category and Non-HDL cholesterol goals:  Coronary heart disease (CHD)or equivalent (10-year risk of CHD >20%):  Non-HDL cholesterol goal      <130 mg/dL  Two or more CHD risk factors and 10-year risk of CHD <= 20%:  Non-HDL cholesterol goal     <160 mg/dL  0 to 1 CHD risk factor:  Non-HDL cholesterol goal     <190 mg/dL           nRBC   0         Platelet Count   228         POC Creatinine 1.0           POCT Glucose     125       Potassium   3.7         PROTEIN TOTAL   6.8         PT   10.4         RBC   4.67         RDW   13.7         Sample VENOUS           Sodium   141         Total Cholesterol/HDL Ratio   3.5         Triglycerides   180  Comment: The National Cholesterol Education Program (NCEP) has set the  following guidelines (reference values) for triglycerides:  Normal......................<150 mg/dL  Borderline High.............150-199 mg/dL  High........................200-499 mg/dL           TSH   0.778         WBC   7.71                 Significant Imaging: I have reviewed all pertinent imaging results/findings within the past 24 hours.  Imaging Results              CTA Head and Neck (xpd) (Final result)  Result time 12/06/24 15:36:21      Final result by Tiara Cooper MD (12/06/24 15:36:21)                   Impression:      Normal CTA of the head and neck      Electronically signed by: Tiara Cooper  Date:    12/06/2024  Time:    15:36               Narrative:    EXAMINATION:  CTA HEAD AND NECK (XPD)    CLINICAL HISTORY:  Transient ischemic attack (TIA);    TECHNIQUE:  Non contrast low dose axial images were obtained through the head. CT angiogram was performed from the level of the darcy to the top of the head following the IV administration of 100mL of Omnipaque 350.   Sagittal and coronal reconstructions and maximum intensity projection reconstructions were performed. Arterial stenosis percentages are based on NASCET measurement criteria.    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    Subclavian arteries: The subclavian arteries are without hemodynamically significant stenosis or occlusion.    Right carotid: The right common carotid artery is without significant stenosis.   The right internal carotid artery is without significant stenosis, occlusion, or dissection.    Left carotid: The left common carotid artery is without significant stenosis.   The left internal carotid artery is without significant stenosis, occlusion, or dissection.    Extracranial vertebral arteries:  The vertebral arteries are without significant stenosis, occlusion, or dissection to the skull base.    Intracranial:    Anterior circulation: The course and caliber the internal carotid arteries are normal.  No areas of significant atherosclerotic narrowing or filling defects are identified.  The middle cerebral arteries are normal.  The anterior cerebral arteries are normal.    Posterior circulation: The vertebral arteries are normal. The basilar artery is normal. The posterior cerebral arteries are normal.    No evidence of aneurysm or arteriovenous malformation.    Dural venous sinuses are patent.    Other:    The paraspinous soft tissues are unremarkable.  There are mildly prominent cervical lymph nodes most likely reactive.  The orbits are unremarkable.    The visualized portions of the lung apices are unremarkable.    There are degenerative spine changes. No concerning osseous lesions identified.  There is mucosal thickening the maxillary and ethmoid sinuses.  The remainder of the paranasal sinuses and mastoid air cells are clear.                                       CT HEAD FOR STROKE (Final result)  Result time 12/06/24 15:27:18      Final result by Tiara Cooper MD (12/06/24 15:27:18)                   Impression:      No acute intracranial process    These findings were called to the ordering physician at 15:25      Electronically signed by: Tiara  Kenneth  Date:    12/06/2024  Time:    15:27               Narrative:    CLINICAL HISTORY:  ([id number])59 y/o  (1965) F    Neuro deficit, acute, stroke suspected;    TECHNIQUE:  (ASHIA#[id number], exam time 12/6/2024 15:21)    CT HEAD FOR STROKE [id number]    Axial CT of the brain without contrast using soft tissue and bone algorithm. None.    CMS MANDATED QUALITY DATA - CT RADIATION - 436    All CT scans at this facility utilize dose modulation, iterative reconstruction, and/or weight based dosing when appropriate to reduce radiation dose to as low as reasonably achievable.    COMPARISON:  12/06/2018    FINDINGS:  The ventricles and sulci are normal in size and configuration for age.  There is no hemorrhage, mass or midline shift.  There are no extra-axial fluid collections.    The cerebellum and brainstem are normal.  The gray-white differentiation is maintained.  The orbits are unremarkable.    There is mucosal thickening in the maxillary and ethmoid sinuses.  The remainder of the paranasal sinuses are clear.  The calvarium is intact.                                      Assessment/Plan:     * Acute focal neurological deficit  LKW 12/6/24 @1430,   No TNK candidate,   Symptoms lasting 1 min, presented with left facial droop, slurred speech, left arm weakness, left arm numbness, symptoms resolving  Continues to have left visual field cut deficit.   CT head negative  CTA head and neck with no LVO or significant stenosis  Begin aspirin/statin  Echo ordered  MRI ordered  PT/OT eval and treat    Permissive hypertension          Atrial fibrillation  Patient has paroxysmal (<7 days) atrial fibrillation. Patient is currently in atrial fibrillation. [id number] Score: 1. The patients heart rate in the last 24 hours is as follows:  Pulse  Min: 103  Max: 103     Antiarrhythmics     Continue nebivolol  Anticoagulants     restart eliquis  Plan  - Replete lytes with a goal of K>4, Mg >2  - Patient is not anticoagulated due to  refusing to take her eliquis  - Patient's afib is currently controlled  - restart eliquis, continue nebivolol            VTE Risk Mitigation (From admission, onward)           Ordered     Reason for No Pharmacological VTE Prophylaxis  Once        Question:  Reasons:  Answer:  Already adequately anticoagulated on oral Anticoagulants    12/06/24 1708     IP VTE HIGH RISK PATIENT  Once         12/06/24 1708     Place sequential compression device  Until discontinued         12/06/24 1708                         On 12/06/2024, patient should be placed in hospital observation services under my care in collaboration with Dr. Ramos.           Marine Estrada, NP  Department of Hospital Medicine  Former[facility] - Emergency Dept

## 2024-12-07 NOTE — PLAN OF CARE
[facility]  Initial Discharge Assessment       Primary Care Provider: Amy Adam MD    Admission Diagnosis: Acute focal neurological deficit [R29.818]    Admission Date: 12/6/2024  Expected Discharge Date: 12/7/2024  Pt is a 58-year-old female who arrived from home with  Acute focal neurological deficit problem. Information verified as correct on facesheet. The assessment was completed at the patient's bedside.  Pt lives alone. Pt does not have a Living Will or Advance Directive. The Pt is oriented to person, places, and times. PCP last seen 3 months ago.  Pt denies Coumadin, dialysis, DME, HH, but she does states that she does take Eliquis and has not been readmitted into the hospital in the last thirty days.  Pt is capable of performing ADLs with some assistance. Pt drives to and from medical appointments. Pt reports she takes her medication as prescribed; pharmacy used [facility] on [address].  Pt verbalized plan to discharge home via family transport. Pt has no other needs to be addressed at this time. CM reviewed the chart and will continue to monitor.         Payor: [facility] / Plan: [facility] / Product Type: Medicare Advantage /     Extended Emergency Contact Information  Primary Emergency Contact: Delmis Adhikari   [place]  Mobile Phone: [phone]  Relation: Daughter   needed? No    Discharge Plan A: Home with family  Discharge Plan B: Home with family      [facility] - [place] - [address]  [address] [facility] [address]  Phone: [phone] Fax: [phone]    [id number] - [place] - [address]  [address]  [facility] [address]  Phone: [phone] Fax: [phone]    [facility] - [place], [place] - [address]  [address] [address]  Phone: [phone] Fax: [phone]    [facility]  [address]  [address]  [place] [address]  Phone: [phone] Fax: [phone]      Initial Assessment (most recent)       Adult Discharge Assessment - 12/07/24 1221          Discharge Assessment    Assessment Type Discharge Planning Assessment     Confirmed/corrected address, phone number and insurance Yes     Confirmed Demographics Correct on Facesheet     Source of Information patient     When was your last doctors appointment? --   Pt said she went to her PCP in September, 2024.    Does patient/caregiver understand observation status Yes     Reason For Admission Acute focal neurological deficit     People in Home alone     Facility Arrived From: Home     Do you expect to return to your current living situation? Yes     Do you have help at home or someone to help you manage your care at home? Yes     Who are your caregiver(s) and their phone number(s)? Delmis Adhikari (Daughter)  [phone] (Mobile)     Prior to hospitilization cognitive status: Alert/Oriented     Current cognitive status: Alert/Oriented     Walking or Climbing Stairs Difficulty yes     Walking or Climbing Stairs ambulation difficulty, assistance 1 person     Dressing/Bathing Difficulty no     Home Accessibility not wheelchair accessible     Home Layout Able to live on 1st floor     Equipment Currently Used at Home none     Readmission within 30 days? No     Patient currently being followed by outpatient case management? No     Do you currently have service(s) that help you manage your care at home? No     Do you take prescription medications? Yes     Do you have prescription coverage? Yes     [facility]     Do you have any problems affording any of your prescribed medications? No     Is the patient taking medications as prescribed? yes     Who is going to help you get home at discharge? Delmis Adhikari (Daughter)  [phone] (Mobile)     How do you get to doctors appointments? car, drives  self;family or friend will provide     Are you on dialysis? No     Do you take coumadin? No     Discharge Plan A Home with family     Discharge Plan B Home with family     DME Needed Upon Discharge  none

## 2024-12-11 ENCOUNTER — TELEPHONE (OUTPATIENT)
Dept: NEUROLOGY | Facility: CLINIC | Age: 59
End: 2024-12-11
Payer: MEDICARE

## 2024-12-11 ENCOUNTER — PATIENT OUTREACH (OUTPATIENT)
Dept: ADMINISTRATIVE | Facility: CLINIC | Age: 59
End: 2024-12-11
Payer: MEDICARE

## 2024-12-11 NOTE — TELEPHONE ENCOUNTER
Spoke with the pt, reports she had a stroke x 5 days ago, needs to est care with neurology. Appt scheduled with vascular neuro, directions provided.

## 2024-12-11 NOTE — PROGRESS NOTES
C3 nurse attempted to contact patient for a TCC post hospital discharge follow-up call. The patient declined call at this time.

## 2024-12-11 NOTE — TELEPHONE ENCOUNTER
----- Message from Manuel sent at 12/11/2024 10:08 AM CST -----  Type: Needs Medical Advice    Who Called:  Pt    Best Call Back Number: [phone]    Additional Information: Pt states that she got a call from a head nurse in neuro about an rohit , not seeing it in her chart but she is requesting a call back. Needs rohit after stroke and hospital follow up. Please call back to advise, Thanks!

## 2024-12-12 ENCOUNTER — TELEPHONE (OUTPATIENT)
Dept: CARDIOLOGY | Facility: CLINIC | Age: 59
End: 2024-12-12
Payer: MEDICARE

## 2024-12-16 ENCOUNTER — OFFICE VISIT (OUTPATIENT)
Dept: FAMILY MEDICINE | Facility: CLINIC | Age: 59
End: 2024-12-16
Payer: MEDICARE

## 2024-12-16 VITALS
HEART RATE: 67 BPM | SYSTOLIC BLOOD PRESSURE: 132 MMHG | HEIGHT: 67 IN | TEMPERATURE: 98 F | WEIGHT: 187.38 LBS | RESPIRATION RATE: 16 BRPM | OXYGEN SATURATION: 98 % | BODY MASS INDEX: 29.41 KG/M2 | DIASTOLIC BLOOD PRESSURE: 78 MMHG

## 2024-12-16 DIAGNOSIS — R29.818 ACUTE FOCAL NEUROLOGICAL DEFICIT: Primary | ICD-10-CM

## 2024-12-16 DIAGNOSIS — I48.0 PAROXYSMAL ATRIAL FIBRILLATION: ICD-10-CM

## 2024-12-16 DIAGNOSIS — Z76.0 MEDICATION REFILL: ICD-10-CM

## 2024-12-16 DIAGNOSIS — I10 ESSENTIAL HYPERTENSION: ICD-10-CM

## 2024-12-16 DIAGNOSIS — J44.9 CHRONIC OBSTRUCTIVE PULMONARY DISEASE, UNSPECIFIED COPD TYPE: ICD-10-CM

## 2024-12-16 DIAGNOSIS — E78.2 MIXED HYPERLIPIDEMIA: ICD-10-CM

## 2024-12-16 DIAGNOSIS — T14.8XXA SKIN ABRASION: ICD-10-CM

## 2024-12-16 PROCEDURE — 1159F MED LIST DOCD IN RCRD: CPT | Mod: CPTII,S$GLB,,

## 2024-12-16 PROCEDURE — 99999 PR PBB SHADOW E&M-EST. PATIENT-LVL V: CPT | Mod: PBBFAC,,,

## 2024-12-16 PROCEDURE — 3075F SYST BP GE 130 - 139MM HG: CPT | Mod: CPTII,S$GLB,,

## 2024-12-16 PROCEDURE — 99214 OFFICE O/P EST MOD 30 MIN: CPT | Mod: 25,S$GLB,,

## 2024-12-16 PROCEDURE — 4010F ACE/ARB THERAPY RXD/TAKEN: CPT | Mod: CPTII,S$GLB,,

## 2024-12-16 PROCEDURE — 3044F HG A1C LEVEL LT 7.0%: CPT | Mod: CPTII,S$GLB,,

## 2024-12-16 PROCEDURE — 3078F DIAST BP <80 MM HG: CPT | Mod: CPTII,S$GLB,,

## 2024-12-16 PROCEDURE — 1160F RVW MEDS BY RX/DR IN RCRD: CPT | Mod: CPTII,S$GLB,,

## 2024-12-16 PROCEDURE — 3066F NEPHROPATHY DOC TX: CPT | Mod: CPTII,S$GLB,,

## 2024-12-16 RX ORDER — MUPIROCIN 20 MG/G
OINTMENT TOPICAL 2 TIMES DAILY
Qty: 15 G | Refills: 0 | Status: SHIPPED | OUTPATIENT
Start: 2024-12-16 | End: 2024-12-23

## 2024-12-16 RX ORDER — PREDNISONE 20 MG/1
40 TABLET ORAL DAILY
Qty: 10 TABLET | Refills: 0 | Status: SHIPPED | OUTPATIENT
Start: 2024-12-16 | End: 2024-12-21

## 2024-12-16 RX ORDER — BENZONATATE 200 MG/1
200 CAPSULE ORAL 3 TIMES DAILY PRN
Qty: 30 CAPSULE | Refills: 0 | Status: SHIPPED | OUTPATIENT
Start: 2024-12-16 | End: 2024-12-26

## 2024-12-16 RX ORDER — NYSTATIN 100000 [USP'U]/G
POWDER TOPICAL 2 TIMES DAILY
Qty: 60 G | Refills: 0 | Status: SHIPPED | OUTPATIENT
Start: 2024-12-16

## 2024-12-16 RX ORDER — DOXYCYCLINE 100 MG/1
100 CAPSULE ORAL 2 TIMES DAILY
Qty: 10 CAPSULE | Refills: 0 | Status: SHIPPED | OUTPATIENT
Start: 2024-12-16 | End: 2024-12-21

## 2024-12-16 NOTE — PROGRESS NOTES
"Subjective:       Patient ID:  [id number]     Transitional Care Note  Admit date: 12/6/2024  Discharge date: 12/07/2024  Date of interactive contact (2 business days post D/C): 12/11/2024  Hospitalized at: [facility]   Discharge diagnoses: Acute focal neurological deficit and afib     Family and/or Caretaker present at visit?  No.  Diagnostic tests reviewed/disposition: No diagnosic tests pending after this hospitalization.  Medication changes: start atorvastatin 40mg daily   Home health/community services discussion/referrals: Patient does not have home health established from hospital visit.  They do not need home health.  If needed, we will set up home health for the patient.   Establishment or re-establishment of referral orders for community resources: No other necessary community resources.   Discussion with other health care providers: No discussion with other health care providers necessary.      Chief Complaint: Follow-up      History of Present Illness    Ms. San presents today for follow-up after hospital discharge for  Acute focal neurological deficit.  She reports overall she is doing well since her discharge.  She reports persistent throbbing pain behind left eye due to blood clot. She describes a constant urge to rub the eye and sensation similar to eye dryness despite presence of fluid.   She reports compliance with Eliquis in her cholesterol medication.    She experiences morning cough with phlegm production that began during recent hospitalization. She has a history of COPD with shortness of breath only on exertion. She uses albuterol inhaler 1-2 times daily. She recently quit smoking following hospitalization.     Patient also expresses right-sided abrasions along her forearm.       Discharge summary note:    "[facility] Medicine  Discharge Summary        Patient Name: Martita San  MRN: [id number]  CORAL: [id number]  Patient Class: OP- Observation  Admission Date: " 12/6/2024  Hospital Length of Stay: 0 days  Discharge Date and Time:  12/07/2024 11:31 AM  Attending Physician: Jeremi Fernandez MD   Discharging Provider: Jeremi Fernandez MD  Primary Care Provider: Amy Adam MD     Primary Care Team: Networked reference to record PCT      HPI:   58-year-old female with a past medical history of COPD, hypertension, chronic kidney disease, thyroid disease, carotid stenosis, current tobacco smoker long-term anticoagulation use with Eliquis, fibromyalgia, PTSD, depression who presented to the emergency room with reports of Right arm weakness, difficulty speaking, and facial droop that started at 2:30 p.m. today.  She reports it lasted about a minute and then completely resolved.  Patient reports noncompliance with her Eliquis doses and her last dose was several days ago.  In the ER, CBC unremarkable, CMP with a glucose of 130 K 3.7, LDL 80 triglycerides 180 TSH 0.778, UA pending  CTA head and neck with no large vessel occlusion no significant stenosis, CT head with no acute intracranial process.  EKG with atrial fibribillation  LKW 12/6/24 @ 1425, symptoms completely resolved within a minute telemedicine vascular neurology consulted  Not a TNK candidate due to symptoms resolving  Admit to hospital medicine for TIA versus stroke workup.        * No surgery found *       Hospital Course:   Patient is a 58 year old female who was admitted with right arm weakness, found to have acute infarct on left frontal lobe on MRI. Her BP is controlled. Weakness is somewhat improved. Patient was started on Aspirin and Lipitor, in addition to Eliquis she is on. Echo with bubble study pending. Seen by neuro, recommended to cont Eliquis, no need aspirin       Goals of Care Treatment Preferences:  Code Status: Full Code     Physical Exam  Vitals and nursing note reviewed.   Constitutional:       General: She is not in acute distress.     Appearance: She is not ill-appearing.   HENT:       Head: Normocephalic and atraumatic.      Mouth/Throat:      Mouth: Mucous membranes are moist.   Eyes:      General:      Extraocular Movements: Extraocular movements intact.      Pupils: Pupils are equal, round, and reactive to light.   Cardiovascular:      Rate and Rhythm: Rhythm irregularly irregular.      Heart sounds: No murmur heard.  Pulmonary:      Effort: Pulmonary effort is normal. No respiratory distress.      Breath sounds: Normal breath sounds. No wheezing.   Abdominal:      General: Bowel sounds are normal. There is no distension.      Palpations: Abdomen is soft.      Tenderness: There is no abdominal tenderness.   Musculoskeletal:      Right lower leg: No edema.      Left lower leg: No edema.   Skin:     General: Skin is warm and dry.      Capillary Refill: Capillary refill takes less than 2 seconds.   Neurological:      Mental Status: She is alert and oriented to person, place, and time.      GCS: GCS eye subscore is 4. GCS verbal subscore is 5. GCS motor subscore is 6.      Comments: Left eye with visual field deficit. Loss of peripheral vision, right upper extremity 4/5 power. No facial droop was noted this morning.   Psychiatric:         Mood and Affect: Mood normal  SDOH Screening:  The patient was screened for utility difficulties, food insecurity, transport difficulties, housing insecurity, and interpersonal safety and there were no concerns identified this admission.     Consults:   Consults (From admission, onward)            Status Ordering Provider       Inpatient consult to Neurology  Once        Provider:  Frank Villalobos MD    Acknowledged TINO GROSS consult to case management/social work  Once        Provider:  (Not yet assigned)    Acknowledged DAMIR AGRAWAL       Consult to Telemedicine - Acute Stroke  Once        Provider:  (Not yet assigned)    Acknowledged CHRISTIANO FRIAS                * Acute focal neurological deficit  LKW 12/6/24 @1430,   No TNK  "candidate,   Symptoms lasting 1 min, presented with left facial droop, slurred speech, left arm weakness, left arm numbness, symptoms resolving  Continues to have left visual field cut deficit.   CT head negative  CTA head and neck with no LVO or significant stenosis  MRI shows acute infarct in the left frontal lobe  Final neuro recommendations: cont eliquis, Patient noncompliant   echo with bubble is pending.   BP controlled.      Atrial fibrillation  Patient has paroxysmal (<7 days) atrial fibrillation. Patient is currently in atrial fibrillation. [id number] Score: 1. The patients heart rate in the last 24 hours is as follows:  Pulse  Min: 65  Max: 103      Antiarrhythmics  nebivolol (BYSTOLIC) tablet, Daily, Oral  Continue nebivolol  Anticoagulants  apixaban tablet 5 mg, 2 times daily, Oral  restart eliquis"         Past Medical History:   Diagnosis Date    Allergy     Anxiety     Carotid stenosis 08/2016    Chronic anemia     Chronic kidney disease, stage 3a 07/18/2024    COPD (chronic obstructive pulmonary disease)     Depression     Fibromyalgia     Osteoarthritis     Pinched nerve     PTSD (post-traumatic stress disorder)     Tachycardia     Thyroid disease     Torn rotator cuff     Uterine fibroid 10/2016    Vertigo       Active Problem List with Overview Notes    Diagnosis Date Noted    Acute focal neurological deficit 12/06/2024    Chronic obstructive pulmonary disease, unspecified COPD type 07/18/2024    SELMA (obstructive sleep apnea) 07/18/2024    PAD (peripheral artery disease) 07/09/2024    Atrial fibrillation 10/19/2017    Gastroesophageal reflux disease without esophagitis 10/19/2017    Nummular dermatitis 10/19/2017    Hot flashes due to surgical menopause 10/19/2017    Iron deficiency anemia due to chronic blood loss 05/04/2017     IMO LOAD Q4      Iron deficiency anemia due to chronic blood loss 05/04/2017    Essential hypertension 12/27/2016    CHF (congestive heart failure) 12/27/2016    " Fibromyalgia 12/27/2016    Depression with anxiety/PTSD 12/27/2016      Review of patient's allergies indicates:   Allergen Reactions    Clindamycin Hives    Pcn [penicillins] Anaphylaxis    Diphenhydramine hcl Hives    Influenza virus vaccines     Oxycodone     Adhesive Rash    Metoprolol Rash         Current Outpatient Medications:     albuterol (PROVENTIL/VENTOLIN HFA) 90 mcg/actuation inhaler, Inhale 1-2 puffs into the lungs every 6 (six) hours as needed for Wheezing. Rescue, Disp: 21 g, Rfl: 2    albuterol-ipratropium (DUO-NEB) 2.5 mg-0.5 mg/3 mL nebulizer solution, Take 3 mLs by nebulization every 6 (six) hours as needed for Wheezing or Shortness of Breath. Rescue, Disp: 360 mL, Rfl: 0    amLODIPine (NORVASC) 10 MG tablet, Take 1 tablet (10 mg total) by mouth once daily., Disp: 90 tablet, Rfl: 3    apixaban (ELIQUIS) 5 mg Tab, Take 1 tablet (5 mg total) by mouth 2 (two) times daily., Disp: 180 tablet, Rfl: 3    atorvastatin (LIPITOR) 40 MG tablet, Take 1 tablet (40 mg total) by mouth once daily., Disp: 30 tablet, Rfl: 0    azelastine (ASTELIN) 137 mcg (0.1 %) nasal spray, 2 sprays into each side of nose before bed every night, may use in AM also (Patient taking differently: 2 sprays by Nasal route every evening. 2 sprays into each side of nose before bed every night, may use in AM also), Disp: 30 mL, Rfl: 11    fluticasone propionate (FLONASE) 50 mcg/actuation nasal spray, 2 sprays (100 mcg total) by Each Nostril route every evening. 2 sprays into each nostril before bed every night, Disp: 15.8 mL, Rfl: 11    lisinopriL (PRINIVIL,ZESTRIL) 40 MG tablet, Take 1 tablet (40 mg total) by mouth once daily. (Patient taking differently: Take 40 mg by mouth every evening.), Disp: 90 tablet, Rfl: 3    nebivoloL (BYSTOLIC) 10 MG Tab, Take 1 tablet (10 mg total) by mouth once daily., Disp: 30 tablet, Rfl: 11    omeprazole (PRILOSEC) 40 MG capsule, TAKE 1 CAPSULE BY MOUTH TWICE DAILY BEFORE MEAL(S) (Patient taking  differently: Take 40 mg by mouth 2 (two) times daily before meals.), Disp: 60 capsule, Rfl: 0    doxycycline (VIBRAMYCIN) 100 MG Cap, Take 1 capsule (100 mg total) by mouth 2 (two) times daily. for 5 days, Disp: 10 capsule, Rfl: 0    mupirocin (BACTROBAN) 2 % ointment, Apply topically 2 (two) times daily. for 7 days, Disp: 15 g, Rfl: 0    NYSTOP powder, Apply topically 2 (two) times daily., Disp: 60 g, Rfl: 0    predniSONE (DELTASONE) 20 MG tablet, Take 2 tablets (40 mg total) by mouth once daily. for 5 days, Disp: 10 tablet, Rfl: 0    Lab Results   Component Value Date    WBC 5.24 12/07/2024    HGB 14.8 12/07/2024    HCT 45.2 12/07/2024     12/07/2024    CHOL 163 12/06/2024    TRIG 180 (H) 12/06/2024    HDL 47 12/06/2024    ALT 11 12/07/2024    AST 13 12/07/2024     12/07/2024    K 3.7 12/07/2024     12/07/2024    CREATININE 0.9 12/07/2024    BUN 7 12/07/2024    CO2 33 (H) 12/07/2024    TSH 0.778 12/06/2024    INR 1.0 12/07/2024    HGBA1C 5.5 12/07/2024       Review of Systems   Constitutional:  Negative for fatigue and fever.   HENT: Negative.  Negative for congestion, sneezing and sore throat.    Eyes:  Positive for pain.   Respiratory:  Negative for cough, shortness of breath and wheezing.    Cardiovascular:  Negative for chest pain, palpitations and leg swelling.   Gastrointestinal:  Negative for abdominal pain, nausea and vomiting.   Genitourinary: Negative.    Musculoskeletal: Negative.  Negative for arthralgias.   Skin: Negative.  Negative for rash.   Neurological:  Negative for dizziness, weakness, light-headedness, numbness and headaches.   Hematological: Negative.    Psychiatric/Behavioral: Negative.         Objective:      Physical Exam  Constitutional:       Appearance: Normal appearance.   HENT:      Head: Normocephalic and atraumatic.      Nose: Nose normal.   Eyes:      Extraocular Movements: Extraocular movements intact.   Cardiovascular:      Rate and Rhythm: Normal rate and  regular rhythm.   Pulmonary:      Effort: Pulmonary effort is normal.      Breath sounds: Decreased air movement present. Examination of the right-lower field reveals decreased breath sounds. Examination of the left-lower field reveals decreased breath sounds. Decreased breath sounds present.   Musculoskeletal:         General: Normal range of motion.      Cervical back: Normal range of motion.   Skin:     General: Skin is warm and dry.      Findings: Abrasion present.   Neurological:      General: No focal deficit present.      Mental Status: She is alert and oriented to person, place, and time.   Psychiatric:         Mood and Affect: Mood normal.         Assessment:       1. Acute focal neurological deficit    2. Essential hypertension    3. Chronic obstructive pulmonary disease, unspecified COPD type    4. Paroxysmal atrial fibrillation    5. Mixed hyperlipidemia    6. Medication refill    7. Skin abrasion        Plan:       Martita was seen today for follow-up.    Diagnoses and all orders for this visit:    Acute focal neurological deficit  - Emphasized the importance of continued statin use for stroke prevention, especially considering smoking history and potential elevated cholesterol.  - Continued atorvastatin.  - Continued baby aspirin daily.  - keep upcoming appointment with Neurology    Essential hypertension  - well controlled continue current regimen   - discussed dash diet, exercise/weight loss, and increased cardiovascular exercise   - monitor BP at home w/ goal <130/80     Chronic obstructive pulmonary disease, unspecified COPD type  - concern for exacerbation  -     doxycycline (VIBRAMYCIN) 100 MG Cap; Take 1 capsule (100 mg total) by mouth 2 (two) times daily. for 5 days  -     predniSONE (DELTASONE) 20 MG tablet; Take 2 tablets (40 mg total) by mouth once daily. for 5 days  - Continue albuterol inhaler     Paroxysmal atrial fibrillation  - continue current regimen  - Keep upcoming appt with cardiology      Mixed hyperlipidemia  - continue current regimen  - low fat diet and graded exercise     Medication refill  -     NYSTOP powder; Apply topically 2 (two) times daily.    Skin abrasion  -     mupirocin (BACTROBAN) 2 % ointment; Apply topically 2 (two) times daily. for 7 days  - Keep area clean and dry      - Contact the office if not improving.          Portions of this note were dictated using voice recognition software and may contain dictation related errors in spelling / grammar / syntax not discovered on text review.     Norma Hurtado PA-C

## 2024-12-17 ENCOUNTER — OFFICE VISIT (OUTPATIENT)
Dept: NEUROLOGY | Facility: CLINIC | Age: 59
End: 2024-12-17
Payer: MEDICARE

## 2024-12-17 VITALS
HEART RATE: 76 BPM | WEIGHT: 187.38 LBS | DIASTOLIC BLOOD PRESSURE: 98 MMHG | SYSTOLIC BLOOD PRESSURE: 137 MMHG | BODY MASS INDEX: 29.41 KG/M2 | HEIGHT: 67 IN

## 2024-12-17 DIAGNOSIS — I48.0 PAROXYSMAL ATRIAL FIBRILLATION: ICD-10-CM

## 2024-12-17 DIAGNOSIS — Z86.73 H/O ISCHEMIC LEFT ACA STROKE: Primary | ICD-10-CM

## 2024-12-17 DIAGNOSIS — I10 ESSENTIAL HYPERTENSION: ICD-10-CM

## 2024-12-17 DIAGNOSIS — G47.33 OSA (OBSTRUCTIVE SLEEP APNEA): ICD-10-CM

## 2024-12-17 DIAGNOSIS — I12.9 HYPERTENSIVE CHRONIC KIDNEY DISEASE WITH STAGE 1 THROUGH STAGE 4 CHRONIC KIDNEY DISEASE, OR UNSPECIFIED CHRONIC KIDNEY DISEASE: ICD-10-CM

## 2024-12-17 PROCEDURE — 99999 PR PBB SHADOW E&M-EST. PATIENT-LVL IV: CPT | Mod: PBBFAC,,, | Performed by: NURSE PRACTITIONER

## 2024-12-17 NOTE — PROGRESS NOTES
[facility] VISIT        SUBJECTIVE:     History for Present Illness: Martita San is a 59 y.o.  female  with PMHx of COPD, HTN, CKD, thyroid disease, carotid stenosis, and atrial fibrillation (on long-term Eliquis), fibromyalgia and PTSD who presents to me today for hospital follow-up     Relevant HPI:  Per patient and chart review she presented to [facility] ED on 12/06/2024 with right arm weakness, difficulty speaking facial droop.  Patient reports that and lasted about a minute and completely resolved.  Patient reports noncompliance with Eliquis stating her last dose was several days ago.  MRI of the brain indicated a small area of diffuse restriction in the left frontal lobe.  CTA head and neck without large vessel occlusion or flow-limiting stenosis.        Interval history:    At the time of today's visit, the patient denies new or worsening focal neurologic symptoms concerning for new stroke or TIA. She is doing well overall .  Patient denies associated neurologic deficits specifically vertigo, double vision,  gait imbalance,  language or visual disturbances.  Chronic left lower extremity weakness who/2 prior spinal cord injury  She is independent with ADLs.  She is not currently participating in outpatient therapy as she is back at her neurologic baseline.  Patient history of tobacco dependency; stopped smoking on 12/06/2024.  Patient denies alcohol or recreational drug use.  Patient is compliant with home medications including Eliquis and statin.                Past Medical History:   Diagnosis Date    Allergy      Anxiety      Carotid stenosis 08/2016    Chronic anemia      Chronic kidney disease, stage 3a 07/18/2024    COPD (chronic obstructive pulmonary disease)      Depression      Fibromyalgia      Osteoarthritis      Pinched nerve      PTSD (post-traumatic stress disorder)      Tachycardia      Thyroid disease      Torn rotator cuff      Uterine fibroid 10/2016    Vertigo               Past Surgical History:   Procedure Laterality Date     SECTION        HYSTERECTOMY        LAMINECTOMY        TONSILLECTOMY                 Family History   Problem Relation Name Age of Onset    Arthritis Mother        Hypertension Mother        COPD Mother        Depression Mother        Arthritis Father        Depression Father        Hypertension Father        Heart disease Father        COPD Father        Heart attack Father        Asthma Father        Heart attack Maternal Grandmother        Cancer Maternal Grandmother        Hypertension Maternal Grandmother        Hypertension Paternal Grandmother        Stroke Paternal Grandmother        Tuberculosis Paternal Grandfather            Current Medications      Current Outpatient Medications:     albuterol (PROVENTIL/VENTOLIN HFA) 90 mcg/actuation inhaler, Inhale 1-2 puffs into the lungs every 6 (six) hours as needed for Wheezing. Rescue, Disp: 21 g, Rfl: 2    albuterol-ipratropium (DUO-NEB) 2.5 mg-0.5 mg/3 mL nebulizer solution, Take 3 mLs by nebulization every 6 (six) hours as needed for Wheezing or Shortness of Breath. Rescue, Disp: 360 mL, Rfl: 0    amLODIPine (NORVASC) 10 MG tablet, Take 1 tablet (10 mg total) by mouth once daily., Disp: 90 tablet, Rfl: 3    apixaban (ELIQUIS) 5 mg Tab, Take 1 tablet (5 mg total) by mouth 2 (two) times daily., Disp: 180 tablet, Rfl: 3    atorvastatin (LIPITOR) 40 MG tablet, Take 1 tablet (40 mg total) by mouth once daily., Disp: 30 tablet, Rfl: 0    azelastine (ASTELIN) 137 mcg (0.1 %) nasal spray, 2 sprays into each side of nose before bed every night, may use in AM also, Disp: 30 mL, Rfl: 11    benzonatate (TESSALON) 200 MG capsule, Take 1 capsule (200 mg total) by mouth 3 (three) times daily as needed., Disp: 30 capsule, Rfl: 0    doxycycline (VIBRAMYCIN) 100 MG Cap, Take 1 capsule (100 mg total) by mouth 2 (two) times daily. for 5 days, Disp: 10 capsule, Rfl: 0    fluticasone propionate (FLONASE) 50  "mcg/actuation nasal spray, 2 sprays (100 mcg total) by Each Nostril route every evening. 2 sprays into each nostril before bed every night, Disp: 15.8 mL, Rfl: 11    lisinopriL (PRINIVIL,ZESTRIL) 40 MG tablet, Take 1 tablet (40 mg total) by mouth once daily., Disp: 90 tablet, Rfl: 3    mupirocin (BACTROBAN) 2 % ointment, Apply topically 2 (two) times daily. for 7 days, Disp: 15 g, Rfl: 0    nebivoloL (BYSTOLIC) 10 MG Tab, Take 1 tablet (10 mg total) by mouth once daily., Disp: 30 tablet, Rfl: 11    NYSTOP powder, Apply topically 2 (two) times daily., Disp: 60 g, Rfl: 0    omeprazole (PRILOSEC) 40 MG capsule, TAKE 1 CAPSULE BY MOUTH TWICE DAILY BEFORE MEAL(S), Disp: 60 capsule, Rfl: 0    predniSONE (DELTASONE) 20 MG tablet, Take 2 tablets (40 mg total) by mouth once daily. for 5 days, Disp: 10 tablet, Rfl: 0         Review of Systems:  Review of systems is negative except for the symptoms mentioned in HPI     Physical exam:     BP (!) 137/98 (BP Location: Left arm, Patient Position: Sitting)   Pulse 76   Ht 5' 7" (1.702 m)   Wt 85 kg (187 lb 6.3 oz)   BMI 29.35 kg/m²      General: Well-developed, well-groomed. No apparent distress  HENT: Normocephalic, atraumatic.    Cardiovascular: Regular rate and rhythm  Chest: No audible wheezes, stridor, ronchi appreciated.  Musculoskeletal: No peripheral edema     Neurologic Exam: The patient is awake, alert and oriented to person, place, time and situation. Attentive, vigilant during exam. Language is fluent. Naming & repetition intact, +2-step commands.  Fund of knowledge is appropriate. Well organized thoughts.     Cranial nerves:   CN II: Visual fields are full to confrontation. Pupils are 4 mm and briskly reactive to light.  CN III, IV, VI: EOMI, no nystagmus, no ptosis  CN V: Facial sensation is intact in all 3 divisions bilaterally.  CN VII: Face is symmetric with normal eye closure and smile.  CN VIII: Hearing is normal bilaterally  CN IX, X: Palate elevates " symmetrically. Phonation is normal.  CN XI: Head turning and shoulder shrug are intact  CN XII: Tongue is midline with normal movements and no atrophy.     Motor examination of all extremities demonstrates normal bulk and tone in all four limbs. There are no atrophy or fasciculations.        Left Right     Left Right   Deltoid 5/5 5/5   Hip Flexion 4/5 5/5   Biceps 5/5 5/5   Hip Extension 4/5 5/5   Triceps 5/5 5/5   Knee Flexion 4/5 5/5   Wrist Ext 5/5 5/5   Knee Extension 4/5 5/5   Finger Abd 5/5 5/5   Ankle dorsiflex 5/5 5/5           Ankle plantar flex 5/5 5/5      Sensory examination is normal light touch in BUE and BLE.    Deep tendon reflexes 2+ Bl     Gait: Posture is normal. Gait is steady with normal steps, base, arm swing, and turning. Heel and toe walking are normal. Tandem gait is normal.      Coordination:  Rapid alternating movements and fine finger movements are intact.         Relevant Labwork:       Recent Labs   Lab 12/06/24  1521 12/07/24  0548   Hemoglobin A1C  --  5.5   LDL Cholesterol 80.0  --    HDL 47  --    Triglycerides 180 H  --    Cholesterol 163  --          Diagnostic Results:  Imaging was independently reviewed by myself, along with the associated radiology report     Brain Imaging   MRI brain 12/6/24:  Small focus of diffusion restriction within the left frontal lobe, consistent with acute infarction.   No evidence of hemorrhagic conversion.   Vessel Imaging   CTA head and neck 12/6/24:  Normal CTA of the head and neck   Cardiac Imaging   TTE 12/07/2024:  EF of 55-60%  Unable to assess diastolic function due to atrial fibrillation  Left atrium is severely dilated  Assessment:  1. H/O ischemic left PEDRO PABLO stroke  Ambulatory referral/consult to Nutrition Services       2. Hypertensive chronic kidney disease with stage 1 through stage 4 chronic kidney disease, or unspecified chronic kidney disease  Ambulatory referral/consult to Nutrition Services       3. Essential hypertension          4.  Paroxysmal atrial fibrillation          5. SELMA (obstructive sleep apnea)             Martita San  is a 59 y.o.  female  seen today in clinic for follow-up assessment and recommendations. The following recommendations and plan were discussed in depth with the patient who voiced understanding and was in agreement.  Plan:  History of left PEDRO PABLO stroke  -Stroke etiology suspected cardioembolic 2/2 atrial fibrillation ; noncompliant with Eliquis  --In-depth discussion with patient regarding diagnosis ,imaging findings  & stroke risk factors  -Plan for aggressive risk factor modification and maximum medical management  -- Antithrombotics/ Anticoagulation:  Continue Eliquis 5 mg b.i.d. for atrial fibrillation and secondary stroke prevention  - Stroke Risk Factors:  HTN, atrial fibrillation, carotid stenosis, tobacco use, and SELMA  - Lipid Management: Target is LDL < 70mg/dL. Continue atorvastatin 40 mg daily. Monitoring for liver dysfunction and myopathy is suggested for statins. To be addressed by PCP  -Hypertension: Long term goal is normotension w/ target BP of less than 130/80 mmHg.  Continue home medications and follow up with PCP for surveillance and chronic management  -Atrial fibrillation: Stroke risk factor. Continue home Eliquis 5 mg b.i.d. .Follow up with cardiology for surveillance and chronic management  Tobacco dependency: Counseled on smoking cessation.  Tobacco dependency will increase your future stroke risk and elevated your BP. Patient stopped smoking on 12/06/2024.  Patient encouraged to continue with smoking cessation  -SELMA :Stroke Risk factor .  Follow up with PCP for surveillance and chronic management  - Rehab efforts:  None  - Diagnostics ordered/pending:  None  -ambulatory referral to dietitian: Patient is currently non meat eater would like to discuss balance diet     Patient/Family teaching provided during this visit  -Identifying the signs and symptoms of stroke; emergency action and ER  attention  -Risk factor control  -Optimization for secondary stroke prevention including compliance with current medications   -We discussed the need to optimize lifestyle choices   -heart healthy diet (Mediterranean,MIND  or dash) to include a variety of brain friendly foods to help optimize cognitive health and longevity  -increased cardiovascular exercise; goal of 150 minutes of moderate-intense per week  -Discussed that medication/lifestyle modifications are preventative,and nothing is absolute.      Questions and concerns were answered to the patient's stated verbal satisfaction. The patient verbalizes understanding and agreement with the above stated treatment plan.       I will plan on having Martita ROSALEE San return to clinic as needed. The patient can contact my office with any questions or concerns they may have as they arise in the interim.         Collaborating Physician, Dr Cullen, was available during today's encounter. Any change to plan along with cosign to appear in the EMR     PATIENCE NaiduC  Department of Vascular Neurology  [facility]  [phone]     This note is dictated on M*Modal Fluency Direct word recognition program. There are word recognition mistakes that are occasionally missed on review

## 2024-12-17 NOTE — PROGRESS NOTES
[facility] VISIT      SUBJECTIVE:    History for Present Illness: Martita San is a 59 y.o.  female  with PMHx of COPD, HTN, CKD, thyroid disease, carotid stenosis, and atrial fibrillation (on long-term Eliquis), fibromyalgia and PTSD who presents to me today for hospital follow-up    Relevant HPI:  Per patient and chart review she presented to [facility] ED on 12/06/2024 with right arm weakness, difficulty speaking facial droop.  Patient reports that and lasted about a minute and completely resolved.  Patient reports noncompliance with Eliquis stating her last dose was several days ago.  MRI of the brain indicated a small area of diffuse restriction in the left frontal lobe.  CTA head and neck without large vessel occlusion or flow-limiting stenosis.      Interval history:    At the time of today's visit, the patient denies new or worsening focal neurologic symptoms concerning for new stroke or TIA. She is doing well overall .  Patient denies associated neurologic deficits specifically vertigo, double vision,  gait imbalance,  language or visual disturbances.  Chronic left lower extremity weakness who/2 prior spinal cord injury  She is independent with ADLs.  She is not currently participating in outpatient therapy as she is back at her neurologic baseline.  Patient history of tobacco dependency; stopped smoking on 12/06/2024.  Patient denies alcohol or recreational drug use.  Patient is compliant with home medications including Eliquis and statin.        Past Medical History:   Diagnosis Date    Allergy     Anxiety     Carotid stenosis 08/2016    Chronic anemia     Chronic kidney disease, stage 3a 07/18/2024    COPD (chronic obstructive pulmonary disease)     Depression     Fibromyalgia     Osteoarthritis     Pinched nerve     PTSD (post-traumatic stress disorder)     Tachycardia     Thyroid disease     Torn rotator cuff     Uterine fibroid 10/2016    Vertigo      Past Surgical History:    Procedure Laterality Date     SECTION      HYSTERECTOMY      LAMINECTOMY      TONSILLECTOMY       Family History   Problem Relation Name Age of Onset    Arthritis Mother      Hypertension Mother      COPD Mother      Depression Mother      Arthritis Father      Depression Father      Hypertension Father      Heart disease Father      COPD Father      Heart attack Father      Asthma Father      Heart attack Maternal Grandmother      Cancer Maternal Grandmother      Hypertension Maternal Grandmother      Hypertension Paternal Grandmother      Stroke Paternal Grandmother      Tuberculosis Paternal Grandfather          Current Outpatient Medications:     albuterol (PROVENTIL/VENTOLIN HFA) 90 mcg/actuation inhaler, Inhale 1-2 puffs into the lungs every 6 (six) hours as needed for Wheezing. Rescue, Disp: 21 g, Rfl: 2    albuterol-ipratropium (DUO-NEB) 2.5 mg-0.5 mg/3 mL nebulizer solution, Take 3 mLs by nebulization every 6 (six) hours as needed for Wheezing or Shortness of Breath. Rescue, Disp: 360 mL, Rfl: 0    amLODIPine (NORVASC) 10 MG tablet, Take 1 tablet (10 mg total) by mouth once daily., Disp: 90 tablet, Rfl: 3    apixaban (ELIQUIS) 5 mg Tab, Take 1 tablet (5 mg total) by mouth 2 (two) times daily., Disp: 180 tablet, Rfl: 3    atorvastatin (LIPITOR) 40 MG tablet, Take 1 tablet (40 mg total) by mouth once daily., Disp: 30 tablet, Rfl: 0    azelastine (ASTELIN) 137 mcg (0.1 %) nasal spray, 2 sprays into each side of nose before bed every night, may use in AM also, Disp: 30 mL, Rfl: 11    benzonatate (TESSALON) 200 MG capsule, Take 1 capsule (200 mg total) by mouth 3 (three) times daily as needed., Disp: 30 capsule, Rfl: 0    doxycycline (VIBRAMYCIN) 100 MG Cap, Take 1 capsule (100 mg total) by mouth 2 (two) times daily. for 5 days, Disp: 10 capsule, Rfl: 0    fluticasone propionate (FLONASE) 50 mcg/actuation nasal spray, 2 sprays (100 mcg total) by Each Nostril route every evening. 2 sprays into each  "nostril before bed every night, Disp: 15.8 mL, Rfl: 11    lisinopriL (PRINIVIL,ZESTRIL) 40 MG tablet, Take 1 tablet (40 mg total) by mouth once daily., Disp: 90 tablet, Rfl: 3    mupirocin (BACTROBAN) 2 % ointment, Apply topically 2 (two) times daily. for 7 days, Disp: 15 g, Rfl: 0    nebivoloL (BYSTOLIC) 10 MG Tab, Take 1 tablet (10 mg total) by mouth once daily., Disp: 30 tablet, Rfl: 11    NYSTOP powder, Apply topically 2 (two) times daily., Disp: 60 g, Rfl: 0    omeprazole (PRILOSEC) 40 MG capsule, TAKE 1 CAPSULE BY MOUTH TWICE DAILY BEFORE MEAL(S), Disp: 60 capsule, Rfl: 0    predniSONE (DELTASONE) 20 MG tablet, Take 2 tablets (40 mg total) by mouth once daily. for 5 days, Disp: 10 tablet, Rfl: 0     Review of Systems:  Review of systems is negative except for the symptoms mentioned in HPI    Physical exam:    BP (!) 137/98 (BP Location: Left arm, Patient Position: Sitting)   Pulse 76   Ht 5' 7" (1.702 m)   Wt 85 kg (187 lb 6.3 oz)   BMI 29.35 kg/m²     General: Well-developed, well-groomed. No apparent distress  HENT: Normocephalic, atraumatic.    Cardiovascular: Regular rate and rhythm  Chest: No audible wheezes, stridor, ronchi appreciated.  Musculoskeletal: No peripheral edema     Neurologic Exam: The patient is awake, alert and oriented to person, place, time and situation. Attentive, vigilant during exam. Language is fluent. Naming & repetition intact, +2-step commands.  Fund of knowledge is appropriate. Well organized thoughts.     Cranial nerves:   CN II: Visual fields are full to confrontation. Pupils are 4 mm and briskly reactive to light.  CN III, IV, VI: EOMI, no nystagmus, no ptosis  CN V: Facial sensation is intact in all 3 divisions bilaterally.  CN VII: Face is symmetric with normal eye closure and smile.  CN VIII: Hearing is normal bilaterally  CN IX, X: Palate elevates symmetrically. Phonation is normal.  CN XI: Head turning and shoulder shrug are intact  CN XII: Tongue is midline with " normal movements and no atrophy.     Motor examination of all extremities demonstrates normal bulk and tone in all four limbs. There are no atrophy or fasciculations.        Left Right     Left Right   Deltoid 5/5 5/5   Hip Flexion 4/5 5/5   Biceps 5/5 5/5   Hip Extension 4/5 5/5   Triceps 5/5 5/5   Knee Flexion 4/5 5/5   Wrist Ext 5/5 5/5   Knee Extension 4/5 5/5   Finger Abd 5/5 5/5   Ankle dorsiflex 5/5 5/5           Ankle plantar flex 5/5 5/5     Sensory examination is normal light touch in BUE and BLE.    Deep tendon reflexes 2+ Bl     Gait: Posture is normal. Gait is steady with normal steps, base, arm swing, and turning. Heel and toe walking are normal. Tandem gait is normal.      Coordination:  Rapid alternating movements and fine finger movements are intact.         Relevant Labwork:  Recent Labs   Lab 12/06/24  1521 12/07/24  0548   Hemoglobin A1C  --  5.5   LDL Cholesterol 80.0  --    HDL 47  --    Triglycerides 180 H  --    Cholesterol 163  --        Diagnostic Results:  Imaging was independently reviewed by myself, along with the associated radiology report    Brain Imaging   MRI brain 12/6/24:  Small focus of diffusion restriction within the left frontal lobe, consistent with acute infarction.   No evidence of hemorrhagic conversion.   Vessel Imaging   CTA head and neck 12/6/24:  Normal CTA of the head and neck   Cardiac Imaging   TTE 12/07/2024:  EF of 55-60%  Unable to assess diastolic function due to atrial fibrillation  Left atrium is severely dilated  Assessment:  1. H/O ischemic left PEDRO PABLO stroke  Ambulatory referral/consult to Nutrition Services      2. Hypertensive chronic kidney disease with stage 1 through stage 4 chronic kidney disease, or unspecified chronic kidney disease  Ambulatory referral/consult to Nutrition Services      3. Essential hypertension        4. Paroxysmal atrial fibrillation        5. SELMA (obstructive sleep apnea)          Martita San  is a 59 y.o.  female  seen today in  clinic for follow-up assessment and recommendations. The following recommendations and plan were discussed in depth with the patient who voiced understanding and was in agreement.  Plan:  History of left PEDRO PABLO stroke  -Stroke etiology suspected cardioembolic 2/2 atrial fibrillation ; noncompliant with Eliquis  --In-depth discussion with patient regarding diagnosis ,imaging findings  & stroke risk factors  -Plan for aggressive risk factor modification and maximum medical management  -- Antithrombotics/ Anticoagulation:  Continue Eliquis 5 mg b.i.d. for atrial fibrillation and secondary stroke prevention  - Stroke Risk Factors:  HTN, atrial fibrillation, carotid stenosis, tobacco use, and SELMA  - Lipid Management: Target is LDL < 70mg/dL. Continue atorvastatin 40 mg daily. Monitoring for liver dysfunction and myopathy is suggested for statins. To be addressed by PCP  -Hypertension: Long term goal is normotension w/ target BP of less than 130/80 mmHg.  Continue home medications and follow up with PCP for surveillance and chronic management  -Atrial fibrillation: Stroke risk factor. Continue home Eliquis 5 mg b.i.d. .Follow up with cardiology for surveillance and chronic management  Tobacco dependency: Counseled on smoking cessation.  Tobacco dependency will increase your future stroke risk and elevated your BP. Patient stopped smoking on 12/06/2024.  Patient encouraged to continue with smoking cessation  -SELMA :Stroke Risk factor .  Follow up with PCP for surveillance and chronic management  - Rehab efforts:  None  - Diagnostics ordered/pending:  None  -ambulatory referral to dietitian: Patient is currently non meat eater would like to discuss balance diet    Patient/Family teaching provided during this visit  -Identifying the signs and symptoms of stroke; emergency action and ER attention  -Risk factor control  -Optimization for secondary stroke prevention including compliance with current medications   -We discussed the  need to optimize lifestyle choices   -heart healthy diet (Mediterranean,MIND  or dash) to include a variety of brain friendly foods to help optimize cognitive health and longevity  -increased cardiovascular exercise; goal of 150 minutes of moderate-intense per week  -Discussed that medication/lifestyle modifications are preventative,and nothing is absolute.     Questions and concerns were answered to the patient's stated verbal satisfaction. The patient verbalizes understanding and agreement with the above stated treatment plan.      I will plan on having Martita San return to clinic as needed. The patient can contact my office with any questions or concerns they may have as they arise in the interim.       Collaborating Physician, Dr Cullen, was available during today's encounter. Any change to plan along with cosign to appear in the EMR    JACKIE Naidu  Department of Vascular Neurology  [facility]  [phone]    This note is dictated on M*Modal Fluency Direct word recognition program. There are word recognition mistakes that are occasionally missed on review

## 2024-12-18 ENCOUNTER — OFFICE VISIT (OUTPATIENT)
Dept: CARDIOLOGY | Facility: CLINIC | Age: 59
End: 2024-12-18
Payer: MEDICARE

## 2024-12-18 VITALS
OXYGEN SATURATION: 98 % | BODY MASS INDEX: 29.41 KG/M2 | HEART RATE: 59 BPM | DIASTOLIC BLOOD PRESSURE: 82 MMHG | RESPIRATION RATE: 17 BRPM | SYSTOLIC BLOOD PRESSURE: 120 MMHG | HEIGHT: 67 IN | WEIGHT: 187.38 LBS

## 2024-12-18 DIAGNOSIS — J44.9 CHRONIC OBSTRUCTIVE PULMONARY DISEASE, UNSPECIFIED COPD TYPE: ICD-10-CM

## 2024-12-18 DIAGNOSIS — I63.9 CEREBROVASCULAR ACCIDENT (CVA), UNSPECIFIED MECHANISM: ICD-10-CM

## 2024-12-18 DIAGNOSIS — M79.7 FIBROMYALGIA: ICD-10-CM

## 2024-12-18 DIAGNOSIS — F41.8 DEPRESSION WITH ANXIETY: Primary | ICD-10-CM

## 2024-12-18 DIAGNOSIS — I48.0 PAROXYSMAL ATRIAL FIBRILLATION: ICD-10-CM

## 2024-12-18 PROCEDURE — 99999 PR PBB SHADOW E&M-EST. PATIENT-LVL III: CPT | Mod: PBBFAC,,, | Performed by: NURSE PRACTITIONER

## 2024-12-18 NOTE — PROGRESS NOTES
" Subjective:    Patient ID:  Martita Sna is a 59 y.o. female patient here for evaluation Follow-up (Doing well)    History of Present Illness:     Pt is here post hospital visit as below. She had stopped taking Eliquis bc she was tired of all the medications but now understands she cannot go without it. She      Admission Date: 12/6/2024  Discharge Date and Time:  12/07/2024 11:31 AM    "HPI:   58-year-old female with a past medical history of COPD, hypertension, chronic kidney disease, thyroid disease, carotid stenosis, current tobacco smoker long-term anticoagulation use with Eliquis, fibromyalgia, PTSD, depression who presented to the emergency room with reports of Right arm weakness, difficulty speaking, and facial droop that started at 2:30 p.m. today.  She reports it lasted about a minute and then completely resolved.  Patient reports noncompliance with her Eliquis doses and her last dose was several days ago.  In the ER, CBC unremarkable, CMP with a glucose of 130 K 3.7, LDL 80 triglycerides 180 TSH 0.778, UA pending  CTA head and neck with no large vessel occlusion no significant stenosis, CT head with no acute intracranial process.  EKG with atrial fibribillation  LKW 12/6/24 @ 1425, symptoms completely resolved within a minute telemedicine vascular neurology consulted  Not a TNK candidate due to symptoms resolving  Admit to hospital medicine for TIA versus stroke workup.        Hospital Course:   Patient is a 58 year old female who was admitted with right arm weakness, found to have acute infarct on left frontal lobe on MRI. Her BP is controlled. Weakness is somewhat improved. Patient was started on Aspirin and Lipitor, in addition to Eliquis she is on. Echo with bubble study pending. Seen by neuro, recommended to cont Eliquis, no need aspirin "          Most Recent Echocardiogram Results  Results for orders placed during the hospital encounter of 12/06/24    Echo Saline Bubble? Yes    Interpretation " Summary    Left Ventricle: The left ventricle is normal in size. Mildly increased wall thickness. Unable to assess wall motion. There is normal systolic function with a visually estimated ejection fraction of 55 - 60%. Unable to assess diastolic function due to atrial fibrillation.    Right Ventricle: Normal right ventricular cavity size. Systolic function is normal.    Left Atrium: Left atrium is severely dilated.    Mitral Valve: Moderately calcified posterior leaflet. There is mild regurgitation.    IVC/SVC: Normal venous pressure at 3 mmHg.      Most Recent Nuclear Stress Test Results  No results found for this or any previous visit.      Most Recent Cardiac PET Stress Test Results  No results found for this or any previous visit.      Most Recent Cardiovascular Angiogram results  No results found for this or any previous visit.      Other Most Recent Cardiology Results  Results for orders placed during the hospital encounter of 24    Cardiac monitoring strips      REVIEW OF SYSTEMS: As noted in HPI       Past Medical History:   Diagnosis Date    Allergy     Anxiety     Carotid stenosis 2016    Chronic anemia     Chronic kidney disease, stage 3a 2024    COPD (chronic obstructive pulmonary disease)     Depression     Fibromyalgia     Osteoarthritis     Pinched nerve     PTSD (post-traumatic stress disorder)     Tachycardia     Thyroid disease     Torn rotator cuff     Uterine fibroid 10/2016    Vertigo      Past Surgical History:   Procedure Laterality Date     SECTION      HYSTERECTOMY      LAMINECTOMY      TONSILLECTOMY       Social History     Tobacco Use    Smoking status: Never    Smokeless tobacco: Never   Substance Use Topics    Alcohol use: No    Drug use: Yes     Types: Marijuana         Objective      Vitals:    24 1555   BP: 120/82   Pulse: (!) 59   Resp: 17       LAST EKG  Results for orders placed or performed during the hospital encounter of 24   ECG 12 lead     Collection Time: 12/06/24  3:47 PM   Result Value Ref Range    QRS Duration 76 ms    [facility] QTC Calculation 467 ms    Narrative    Test Reason : R29.818,    Vent. Rate :  84 BPM     Atrial Rate :    BPM     P-R Int :    ms          QRS Dur :  76 ms      QT Int : 396 ms       P-R-T Axes :     52  30 degrees    QTcB Int : 467 ms    Atrial fibrillation  Abnormal ECG  No previous ECGs available  Confirmed by Sj Lamar ([id number]) on 12/6/2024 9:53:02 PM    Referred By:            Confirmed By: Sj Lamar     LIPIDS - LAST 2   Lab Results   Component Value Date    CHOL 163 12/06/2024    CHOL 207 (H) 01/28/2020    HDL 47 12/06/2024    HDL 54 01/28/2020    LDLCALC 80.0 12/06/2024    LDLCALC 125.6 01/28/2020    TRIG 180 (H) 12/06/2024    TRIG 137 01/28/2020    CHOLHDL 28.8 12/06/2024    CHOLHDL 26.1 01/28/2020     CARDIAC PROFILE - LAST 2  Lab Results   Component Value Date     (H) 12/26/2016    CPKMB 1.9 12/07/2024    TROPONINI <0.030 07/28/2020    TROPONINI 0.016 12/27/2016      CBC - LAST 2  Lab Results   Component Value Date    WBC 5.24 12/07/2024    WBC 7.71 12/06/2024    RBC 4.79 12/07/2024    RBC 4.67 12/06/2024    HGB 14.8 12/07/2024    HGB 14.5 12/06/2024    HCT 45.2 12/07/2024    HCT 44.4 12/06/2024     12/07/2024     12/06/2024     Lab Results   Component Value Date    INR 1.0 12/07/2024    INR 0.9 12/06/2024    APTT 32.3 (H) 12/07/2024     CHEMISTRY - LAST 2  Lab Results   Component Value Date     12/07/2024     12/06/2024    K 3.7 12/07/2024    K 3.7 12/06/2024     12/07/2024     12/06/2024    CO2 33 (H) 12/07/2024    CO2 30 (H) 12/06/2024    ANIONGAP 6 (L) 12/07/2024    ANIONGAP 6 (L) 12/06/2024    BUN 7 12/07/2024    BUN 9 12/06/2024    CREATININE 0.9 12/07/2024    CREATININE 0.9 12/06/2024    GLU 86 12/07/2024     (H) 12/06/2024    CALCIUM 9.5 12/07/2024    CALCIUM 9.1 12/06/2024    MG 2.0 12/07/2024    MG 2.0 01/28/2020    ALBUMIN 4.0 12/07/2024    ALBUMIN  4.2 12/06/2024    PROT 6.5 12/07/2024    PROT 6.8 12/06/2024    ALKPHOS 59 12/07/2024    ALKPHOS 52 (L) 12/06/2024    ALT 11 12/07/2024    ALT 13 12/06/2024    AST 13 12/07/2024    AST 16 12/06/2024    BILITOT 0.5 12/07/2024    BILITOT 0.4 12/06/2024      ENDOCRINE - LAST 2  Lab Results   Component Value Date    HGBA1C 5.5 12/07/2024    TSH 0.778 12/06/2024    TSH 1.446 12/27/2016        PHYSICAL EXAM  CONSTITUTIONAL: well nourished middle aged female in no apparent distress  NECK: no carotid bruit, no JVD  LUNGS: CTA  CHEST WALL: no tenderness  HEART: Irregular rate and rhythm, S1, S2 normal, no murmur  ABDOMEN: soft, non-tender; bowel sounds normal; no masses  EXTREMITIES: Extremities normal, no edema, no calf tenderness noted  NEURO: AAO X 3, speech clear, memory clear    I HAVE REVIEWED :    The vital signs, most recent cardiac testing, and most recent pertinent non-cardiology provider notes.    Current Outpatient Medications   Medication Instructions    albuterol (PROVENTIL/VENTOLIN HFA) 90 mcg/actuation inhaler 1-2 puffs, Inhalation, Every 6 hours PRN, Rescue    albuterol-ipratropium (DUO-NEB) 2.5 mg-0.5 mg/3 mL nebulizer solution 3 mLs, Nebulization, Every 6 hours PRN, Rescue    amLODIPine (NORVASC) 10 mg, Oral, Daily    apixaban (ELIQUIS) 5 mg, Oral, 2 times daily    atorvastatin (LIPITOR) 40 mg, Oral, Daily    azelastine (ASTELIN) 137 mcg (0.1 %) nasal spray 2 sprays into each side of nose before bed every night, may use in AM also    benzonatate (TESSALON) 200 mg, Oral, 3 times daily PRN    doxycycline (VIBRAMYCIN) 100 mg, Oral, 2 times daily    fluticasone propionate (FLONASE) 100 mcg, Each Nostril, Nightly, 2 sprays into each nostril before bed every night    lisinopriL (PRINIVIL,ZESTRIL) 40 mg, Oral, Daily    mupirocin (BACTROBAN) 2 % ointment Topical (Top), 2 times daily    nebivoloL (BYSTOLIC) 10 mg, Oral, Daily    NYSTOP powder Topical (Top), 2 times daily    omeprazole (PRILOSEC) 40 MG capsule TAKE  1 CAPSULE BY MOUTH TWICE DAILY BEFORE MEAL(S)    predniSONE (DELTASONE) 40 mg, Oral, Daily      Assessment & Plan     Atrial fibrillation  Chronic Afib; Understands she cannot stop Eliquis going forward.   Continue Bystolic; IRRR, rate controlled    Fibromyalgia  Chronic issue  Continue follow up with PCP    CVA (cerebral vascular accident)   found to have acute infarct on left frontal lobe on MRI.   Discussed importance of not missing Eliquis  Continue statin therapy   Neurology follow up as directed          No follow-ups on file.              SHE Sweet

## 2024-12-19 PROBLEM — I63.9 CVA (CEREBRAL VASCULAR ACCIDENT): Status: ACTIVE | Noted: 2024-12-19

## 2024-12-19 NOTE — ASSESSMENT & PLAN NOTE
Chronic Afib; Understands she cannot stop Eliquis going forward.   Continue Bystolic; IRRR, rate controlled

## 2024-12-19 NOTE — ASSESSMENT & PLAN NOTE
found to have acute infarct on left frontal lobe on MRI.   Discussed importance of not missing Eliquis  Continue statin therapy   Neurology follow up as directed

## 2025-01-05 RX ORDER — OMEPRAZOLE 40 MG/1
CAPSULE, DELAYED RELEASE ORAL
Qty: 60 CAPSULE | Refills: 0 | Status: CANCELLED | OUTPATIENT
Start: 2025-01-05

## 2025-01-06 ENCOUNTER — PATIENT MESSAGE (OUTPATIENT)
Dept: CARDIOLOGY | Facility: CLINIC | Age: 60
End: 2025-01-06
Payer: MEDICARE

## 2025-01-06 DIAGNOSIS — Z76.0 MEDICATION REFILL: ICD-10-CM

## 2025-01-06 DIAGNOSIS — E78.2 MIXED HYPERLIPIDEMIA: Primary | ICD-10-CM

## 2025-01-06 RX ORDER — FLUTICASONE PROPIONATE 50 MCG
2 SPRAY, SUSPENSION (ML) NASAL NIGHTLY
Qty: 15.8 ML | Refills: 11 | Status: SHIPPED | OUTPATIENT
Start: 2025-01-06 | End: 2026-01-06

## 2025-01-06 RX ORDER — OMEPRAZOLE 40 MG/1
40 CAPSULE, DELAYED RELEASE ORAL EVERY MORNING
Qty: 30 CAPSULE | Refills: 11 | Status: SHIPPED | OUTPATIENT
Start: 2025-01-06 | End: 2026-01-06

## 2025-01-06 RX ORDER — AZELASTINE 1 MG/ML
SPRAY, METERED NASAL
Qty: 30 ML | Refills: 11 | Status: SHIPPED | OUTPATIENT
Start: 2025-01-06

## 2025-01-07 RX ORDER — ATORVASTATIN CALCIUM 40 MG/1
40 TABLET, FILM COATED ORAL DAILY
Qty: 30 TABLET | Refills: 2 | Status: SHIPPED | OUTPATIENT
Start: 2025-01-07 | End: 2025-04-07

## 2025-01-07 RX ORDER — NYSTATIN 100000 [USP'U]/G
POWDER TOPICAL 2 TIMES DAILY
Qty: 60 G | Refills: 0 | Status: SHIPPED | OUTPATIENT
Start: 2025-01-07

## 2025-02-06 ENCOUNTER — HOSPITAL ENCOUNTER (EMERGENCY)
Facility: HOSPITAL | Age: 60
Discharge: HOME | End: 2025-02-06
Attending: EMERGENCY MEDICINE
Payer: MEDICARE

## 2025-02-06 VITALS
WEIGHT: 190 LBS | TEMPERATURE: 98 F | HEIGHT: 67 IN | SYSTOLIC BLOOD PRESSURE: 147 MMHG | OXYGEN SATURATION: 99 % | DIASTOLIC BLOOD PRESSURE: 95 MMHG | RESPIRATION RATE: 17 BRPM | HEART RATE: 72 BPM | BODY MASS INDEX: 29.82 KG/M2

## 2025-02-06 DIAGNOSIS — M54.6 ACUTE LEFT-SIDED THORACIC BACK PAIN: Primary | ICD-10-CM

## 2025-02-06 DIAGNOSIS — M25.519 SHOULDER PAIN, UNSPECIFIED CHRONICITY, UNSPECIFIED LATERALITY: ICD-10-CM

## 2025-02-06 PROCEDURE — 96372 THER/PROPH/DIAG INJ SC/IM: CPT | Performed by: EMERGENCY MEDICINE

## 2025-02-06 PROCEDURE — 99284 EMERGENCY DEPT VISIT MOD MDM: CPT | Mod: 25

## 2025-02-06 PROCEDURE — 63600175 PHARM REV CODE 636 W HCPCS: Performed by: EMERGENCY MEDICINE

## 2025-02-06 RX ORDER — PREDNISONE 20 MG/1
40 TABLET ORAL DAILY
Qty: 10 TABLET | Refills: 0 | Status: SHIPPED | OUTPATIENT
Start: 2025-02-06 | End: 2025-02-11

## 2025-02-06 RX ORDER — METHOCARBAMOL 500 MG/1
1000 TABLET, FILM COATED ORAL 3 TIMES DAILY
Qty: 30 TABLET | Refills: 0 | Status: SHIPPED | OUTPATIENT
Start: 2025-02-06 | End: 2025-02-11

## 2025-02-06 RX ORDER — DEXAMETHASONE SODIUM PHOSPHATE 4 MG/ML
8 INJECTION, SOLUTION INTRA-ARTICULAR; INTRALESIONAL; INTRAMUSCULAR; INTRAVENOUS; SOFT TISSUE
Status: COMPLETED | OUTPATIENT
Start: 2025-02-06 | End: 2025-02-06

## 2025-02-06 RX ADMIN — DEXAMETHASONE SODIUM PHOSPHATE 8 MG: 4 INJECTION, SOLUTION INTRA-ARTICULAR; INTRALESIONAL; INTRAMUSCULAR; INTRAVENOUS; SOFT TISSUE at 06:02

## 2025-02-06 NOTE — ED PROVIDER NOTES
Encounter Date: 2025       History     Chief Complaint   Patient presents with    Shoulder Pain     Patient left shoulder pain x 3 days. Patient states that sometime she gets a cortisone shot for the pain.     Patient presents complaining of left shoulder pain and discomfort.  Patient states she picked up a heavy water case and since that time has had back and shoulder pain.      Review of patient's allergies indicates:   Allergen Reactions    Clindamycin Hives    Pcn [penicillins] Anaphylaxis    Diphenhydramine hcl Hives    Influenza virus vaccines     Oxycodone     Adhesive Rash    Metoprolol Rash     Past Medical History:   Diagnosis Date    Allergy     Anxiety     Carotid stenosis 2016    Chronic anemia     Chronic kidney disease, stage 3a 2024    COPD (chronic obstructive pulmonary disease)     Depression     Fibromyalgia     Osteoarthritis     Pinched nerve     PTSD (post-traumatic stress disorder)     Tachycardia     Thyroid disease     Torn rotator cuff     Uterine fibroid 10/2016    Vertigo      Past Surgical History:   Procedure Laterality Date     SECTION      HYSTERECTOMY      LAMINECTOMY      TONSILLECTOMY       Family History   Problem Relation Name Age of Onset    Arthritis Mother      Hypertension Mother      COPD Mother      Depression Mother      Arthritis Father      Depression Father      Hypertension Father      Heart disease Father      COPD Father      Heart attack Father      Asthma Father      Heart attack Maternal Grandmother      Cancer Maternal Grandmother      Hypertension Maternal Grandmother      Hypertension Paternal Grandmother      Stroke Paternal Grandmother      Tuberculosis Paternal Grandfather       Social History     Tobacco Use    Smoking status: Never    Smokeless tobacco: Never   Substance Use Topics    Alcohol use: No    Drug use: Yes     Types: Marijuana     Review of Systems   All other systems reviewed and are negative.      Physical Exam     Initial  Vitals [02/06/25 0406]   BP Pulse Resp Temp SpO2   130/85 77 18 97.5 °F (36.4 °C) 96 %      MAP       --         Physical Exam    Nursing note and vitals reviewed.  Constitutional: She appears well-developed and well-nourished.   Pleasant, polite   HENT:   Head: Normocephalic and atraumatic.   Eyes: EOM are normal.   Neck: Neck supple.   Normal range of motion.  Cardiovascular:  Intact distal pulses.           Pulmonary/Chest: No respiratory distress.   I can exactly reproduce the patient's pain just below the scapula on the left.  There is no midline pain.  There is no rash erythema warmth cellulitis or shingles.  Pain is worse with movement.   Musculoskeletal:      Cervical back: Normal range of motion and neck supple.      Comments: Full range of motion of the left shoulder.  No specific point tenderness.  No erythema warmth or cellulitis.     Neurological: She is alert and oriented to person, place, and time.   Skin: Skin is warm and dry. Capillary refill takes less than 2 seconds.   Psychiatric: She has a normal mood and affect. Her behavior is normal. Judgment and thought content normal.         ED Course   Procedures  Labs Reviewed - No data to display       Imaging Results              X-Ray Shoulder 2 or More Views Left (Final result)  Result time 02/06/25 07:13:40      Final result by Tiara Cooper MD (02/06/25 07:13:40)                   Impression:      No acute fracture or dislocation.      Electronically signed by: Tiara Cooper  Date:    02/06/2025  Time:    07:13               Narrative:    CLINICAL HISTORY:  (MRN [id number] y/o  (1965) F    pain;    TECHNIQUE:  (A# [id number], Exam time 2/6/2025 7:08)    [id number] XR SHOULDER COMPLETE 2 OR MORE VIEWS LEFT  view(s) obtained.    COMPARISON:  01/23/2021    FINDINGS:  No acute fracture or dislocation. The joints and interspaces appear to be maintained.  Soft tissues appear radiographically within normal limits and no radiopaque foreign body is  seen.                                       Medications   dexAMETHasone injection 8 mg (8 mg Intramuscular Given 2/6/25 0642)     Medical Decision Making  Patient presenting with left shoulder and thoracic back pain after picking up a heavy case.  No alarm signs or symptoms to suggest cardiac or pulmonary cause for the thoracic pain.  Patient's pain is reproducible.  She received Decadron shot in the emergency department will be given prednisone for the next 5 days as well as muscle relaxer.  Patient will be discharged in stable condition.  Detailed return precautions discussed.    Amount and/or Complexity of Data Reviewed  Radiology: ordered.    Risk  Prescription drug management.                                      Clinical Impression:  Final diagnoses:  [M54.6] Acute left-sided thoracic back pain (Primary)  [M25.519] Shoulder pain, unspecified chronicity, unspecified laterality          ED Disposition Condition    Discharge Stable          ED Prescriptions       Medication Sig Dispense Start Date End Date Auth. Provider    predniSONE (DELTASONE) 20 MG tablet Take 2 tablets (40 mg total) by mouth once daily. for 5 days 10 tablet 2/6/2025 2/11/2025 Polo Rivero MD    methocarbamoL (ROBAXIN) 500 MG Tab Take 2 tablets (1,000 mg total) by mouth 3 (three) times daily. for 5 days 30 tablet 2/6/2025 2/11/2025 Polo Rivero MD          Follow-up Information       Follow up With Specialties Details Why Contact Info    Amy Adam MD Family Medicine Schedule an appointment as soon as possible for a visit in 2 days  [address] [facility] [id number]  [phone]               Polo Rivero MD  02/06/25 [id number]

## 2025-02-07 ENCOUNTER — PATIENT OUTREACH (OUTPATIENT)
Facility: OTHER | Age: 60
End: 2025-02-07
Payer: MEDICARE

## 2025-02-07 NOTE — PROGRESS NOTES
Patient was seen in the ED on 2/6/25. Phoned patient on 2 separate occasions to assist with Post ED Discharge Navigation. Patient was unavailable. Message left on voice mail.  Ele Powell

## 2025-02-24 ENCOUNTER — LAB VISIT (OUTPATIENT)
Dept: LAB | Facility: HOSPITAL | Age: 60
End: 2025-02-24
Attending: INTERNAL MEDICINE
Payer: MEDICARE

## 2025-02-24 DIAGNOSIS — D64.9 ANEMIA, UNSPECIFIED TYPE: ICD-10-CM

## 2025-02-24 DIAGNOSIS — D50.0 IRON DEFICIENCY ANEMIA DUE TO CHRONIC BLOOD LOSS: ICD-10-CM

## 2025-02-24 DIAGNOSIS — E55.9 VITAMIN D DEFICIENCY: ICD-10-CM

## 2025-02-24 DIAGNOSIS — D53.9 NUTRITIONAL ANEMIA, UNSPECIFIED: ICD-10-CM

## 2025-02-24 LAB
25(OH)D3+25(OH)D2 SERPL-MCNC: 41 NG/ML (ref 30–96)
ALBUMIN SERPL BCP-MCNC: 4.1 G/DL (ref 3.5–5.2)
ALP SERPL-CCNC: 64 U/L (ref 55–135)
ALT SERPL W/O P-5'-P-CCNC: 12 U/L (ref 10–44)
ANION GAP SERPL CALC-SCNC: 8 MMOL/L (ref 8–16)
AST SERPL-CCNC: 12 U/L (ref 10–40)
BASOPHILS # BLD AUTO: 0.07 K/UL (ref 0–0.2)
BASOPHILS NFR BLD: 1.1 % (ref 0–1.9)
BILIRUB SERPL-MCNC: 0.7 MG/DL (ref 0.1–1)
BUN SERPL-MCNC: 11 MG/DL (ref 6–20)
CALCIUM SERPL-MCNC: 9.9 MG/DL (ref 8.7–10.5)
CHLORIDE SERPL-SCNC: 107 MMOL/L (ref 95–110)
CO2 SERPL-SCNC: 29 MMOL/L (ref 23–29)
CREAT SERPL-MCNC: 1.1 MG/DL (ref 0.5–1.4)
DIFFERENTIAL METHOD BLD: NORMAL
EOSINOPHIL # BLD AUTO: 0.3 K/UL (ref 0–0.5)
EOSINOPHIL NFR BLD: 5.4 % (ref 0–8)
ERYTHROCYTE [DISTWIDTH] IN BLOOD BY AUTOMATED COUNT: 13.2 % (ref 11.5–14.5)
EST. GFR  (NO RACE VARIABLE): 57.5 ML/MIN/1.73 M^2
FERRITIN SERPL-MCNC: 46.7 NG/ML (ref 20–300)
FOLATE SERPL-MCNC: 11.4 NG/ML (ref 4–24)
GLUCOSE SERPL-MCNC: 130 MG/DL (ref 70–110)
HCT VFR BLD AUTO: 45.9 % (ref 37–48.5)
HGB BLD-MCNC: 14.8 G/DL (ref 12–16)
IMM GRANULOCYTES # BLD AUTO: 0.02 K/UL (ref 0–0.04)
IMM GRANULOCYTES NFR BLD AUTO: 0.3 % (ref 0–0.5)
IRON SERPL-MCNC: 82 UG/DL (ref 30–160)
LYMPHOCYTES # BLD AUTO: 1.7 K/UL (ref 1–4.8)
LYMPHOCYTES NFR BLD: 26.5 % (ref 18–48)
MCH RBC QN AUTO: 30.3 PG (ref 27–31)
MCHC RBC AUTO-ENTMCNC: 32.2 G/DL (ref 32–36)
MCV RBC AUTO: 94 FL (ref 82–98)
MONOCYTES # BLD AUTO: 0.6 K/UL (ref 0.3–1)
MONOCYTES NFR BLD: 8.7 % (ref 4–15)
NEUTROPHILS # BLD AUTO: 3.7 K/UL (ref 1.8–7.7)
NEUTROPHILS NFR BLD: 58 % (ref 38–73)
NRBC BLD-RTO: 0 /100 WBC
PLATELET # BLD AUTO: 191 K/UL (ref 150–450)
PMV BLD AUTO: 9.6 FL (ref 9.2–12.9)
POTASSIUM SERPL-SCNC: 4.8 MMOL/L (ref 3.5–5.1)
PROT SERPL-MCNC: 6.8 G/DL (ref 6–8.4)
RBC # BLD AUTO: 4.89 M/UL (ref 4–5.4)
SATURATED IRON: 28 % (ref 20–50)
SODIUM SERPL-SCNC: 144 MMOL/L (ref 136–145)
TOTAL IRON BINDING CAPACITY: 294 UG/DL (ref 250–450)
TRANSFERRIN SERPL-MCNC: 210 MG/DL (ref 200–375)
VIT B12 SERPL-MCNC: 423 PG/ML (ref 210–950)
WBC # BLD AUTO: 6.34 K/UL (ref 3.9–12.7)

## 2025-02-24 PROCEDURE — 84466 ASSAY OF TRANSFERRIN: CPT | Performed by: INTERNAL MEDICINE

## 2025-02-24 PROCEDURE — 82746 ASSAY OF FOLIC ACID SERUM: CPT | Performed by: INTERNAL MEDICINE

## 2025-02-24 PROCEDURE — 82728 ASSAY OF FERRITIN: CPT | Performed by: INTERNAL MEDICINE

## 2025-02-24 PROCEDURE — 82306 VITAMIN D 25 HYDROXY: CPT | Performed by: INTERNAL MEDICINE

## 2025-02-24 PROCEDURE — 85025 COMPLETE CBC W/AUTO DIFF WBC: CPT | Performed by: INTERNAL MEDICINE

## 2025-02-24 PROCEDURE — 80053 COMPREHEN METABOLIC PANEL: CPT | Performed by: INTERNAL MEDICINE

## 2025-02-24 PROCEDURE — 82607 VITAMIN B-12: CPT | Performed by: INTERNAL MEDICINE

## 2025-03-20 DIAGNOSIS — Z76.0 MEDICATION REFILL: ICD-10-CM

## 2025-03-20 RX ORDER — NYSTATIN 100000 [USP'U]/G
POWDER TOPICAL
Qty: 60 G | Refills: 0 | Status: SHIPPED | OUTPATIENT
Start: 2025-03-20

## 2025-04-13 NOTE — PROGRESS NOTES
[facility]  Office Visit Note     SUBJECTIVE:   HPI: Martita San  is a 59 y.o. female here to [facility] care    Low GFR 57    Medical conditions:  History of CVA, depression, PTSD, COPD, HTN, CHF, A fib, PAD, iron deficiency anemia, GERD, fibromyalgia     Meds:  Albuterol, amlodipine 10 mg daily, eliquis 5 mg BID, atorvastatin 40 mg daily, astelin, lisinopril, nebivolol, nystop, omeprazole    Heart failure -- ECHO EF 70% (2021) . Continue with Cards as scheduled     Due for colorectal cancer screening, non-diabetic eye exam, vaccines     History of Present Illness    CHIEF COMPLAINT:  Patient presents today to [facility] care    CURRENT SYMPTOMS:  She presents with an ingrown toenail on the right toe. She reports persistent foot pain and periodic bruising from a 1-year-old injury when a can of green beans was dropped on her foot.     HYPERTENSION:  She reports elevated home blood pressure readings ranging from 150/90 to 190/100. She takes clonidine as needed for high readings, approximately twice weekly.     STROKE HISTORY:  She has a history of stroke after discontinuing Eliquis medication. She denies any residual deficits or weakness.    HEMATOLOGIC:  She follows up with Dr. Rocha yearly for anemia, characterized by RBCs not maturing before leaving the bone marrow.    SOCIAL HISTORY:  She reports current marijuana use and denies use of pain medications, Xanax, or muscle relaxers.    DENTAL:  She has dentures but does not wear them due to associated pain.     Lab Visit on 02/24/2025   Component Date Value Ref Range Status    Hemoglobin A1C 02/24/2025 5.4  4.5 - 6.2 % Final    Estimated Avg Glucose 02/24/2025 108  68 - 131 mg/dL Final   Lab Visit on 02/24/2025   Component Date Value Ref Range Status    WBC 02/24/2025 6.34  3.90 - 12.70 K/uL Final    RBC 02/24/2025 4.89  4.00 - 5.40 M/uL Final    Hemoglobin 02/24/2025 14.8  12.0 - 16.0 g/dL Final    Hematocrit 02/24/2025 45.9  37.0 - 48.5 % Final    MCV  02/24/2025 94  82 - 98 fL Final    MCH 02/24/2025 30.3  27.0 - 31.0 pg Final    MCHC 02/24/2025 32.2  32.0 - 36.0 g/dL Final    RDW 02/24/2025 13.2  11.5 - 14.5 % Final    Platelets 02/24/2025 191  150 - 450 K/uL Final    MPV 02/24/2025 9.6  9.2 - 12.9 fL Final    Immature Granulocytes 02/24/2025 0.3  0.0 - 0.5 % Final    Gran # (ANC) 02/24/2025 3.7  1.8 - 7.7 K/uL Final    Immature Grans (Abs) 02/24/2025 0.02  0.00 - 0.04 K/uL Final    Lymph # 02/24/2025 1.7  1.0 - 4.8 K/uL Final    Mono # 02/24/2025 0.6  0.3 - 1.0 K/uL Final    Eos # 02/24/2025 0.3  0.0 - 0.5 K/uL Final    Baso # 02/24/2025 0.07  0.00 - 0.20 K/uL Final    nRBC 02/24/2025 0  0 /100 WBC Final    Gran % 02/24/2025 58.0  38.0 - 73.0 % Final    Lymph % 02/24/2025 26.5  18.0 - 48.0 % Final    Mono % 02/24/2025 8.7  4.0 - 15.0 % Final    Eosinophil % 02/24/2025 5.4  0.0 - 8.0 % Final    Basophil % 02/24/2025 1.1  0.0 - 1.9 % Final    Differential Method 02/24/2025 Automated   Final    Sodium 02/24/2025 144  136 - 145 mmol/L Final    Potassium 02/24/2025 4.8  3.5 - 5.1 mmol/L Final    Chloride 02/24/2025 107  95 - 110 mmol/L Final    CO2 02/24/2025 29  23 - 29 mmol/L Final    Glucose 02/24/2025 130 (H)  70 - 110 mg/dL Final    BUN 02/24/2025 11  6 - 20 mg/dL Final    Creatinine 02/24/2025 1.1  0.5 - 1.4 mg/dL Final    Calcium 02/24/2025 9.9  8.7 - 10.5 mg/dL Final    Total Protein 02/24/2025 6.8  6.0 - 8.4 g/dL Final    Albumin 02/24/2025 4.1  3.5 - 5.2 g/dL Final    Total Bilirubin 02/24/2025 0.7  0.1 - 1.0 mg/dL Final    Alkaline Phosphatase 02/24/2025 64  55 - 135 U/L Final    AST 02/24/2025 12  10 - 40 U/L Final    ALT 02/24/2025 12  10 - 44 U/L Final    eGFR 02/24/2025 57.5 (A)  >60 mL/min/1.73 m^2 Final    Anion Gap 02/24/2025 8  8 - 16 mmol/L Final    Iron 02/24/2025 82  30 - 160 ug/dL Final    Transferrin 02/24/2025 210  200 - 375 mg/dL Final    TIBC 02/24/2025 294  250 - 450 ug/dL Final    Saturated Iron 02/24/2025 28  20 - 50 % Final    Ferritin  02/24/2025 46.7  20.0 - 300.0 ng/mL Final    Vitamin B-12 02/24/2025 423  210 - 950 pg/mL Final    Folate 02/24/2025 11.4  4.0 - 24.0 ng/mL Final    Vit D, 25-Hydroxy 02/24/2025 41  30 - 96 ng/mL Final   Admission on 12/06/2024, Discharged on 12/07/2024   Component Date Value Ref Range Status    WBC 12/06/2024 7.71  3.90 - 12.70 K/uL Final    RBC 12/06/2024 4.67  4.00 - 5.40 M/uL Final    Hemoglobin 12/06/2024 14.5  12.0 - 16.0 g/dL Final    Hematocrit 12/06/2024 44.4  37.0 - 48.5 % Final    MCV 12/06/2024 95  82 - 98 fL Final    MCH 12/06/2024 31.0  27.0 - 31.0 pg Final    MCHC 12/06/2024 32.7  32.0 - 36.0 g/dL Final    RDW 12/06/2024 13.7  11.5 - 14.5 % Final    Platelets 12/06/2024 228  150 - 450 K/uL Final    MPV 12/06/2024 9.9  9.2 - 12.9 fL Final    Immature Granulocytes 12/06/2024 0.5  0.0 - 0.5 % Final    Gran # (ANC) 12/06/2024 3.7  1.8 - 7.7 K/uL Final    Immature Grans (Abs) 12/06/2024 0.04  0.00 - 0.04 K/uL Final    Lymph # 12/06/2024 2.3  1.0 - 4.8 K/uL Final    Mono # 12/06/2024 0.7  0.3 - 1.0 K/uL Final    Eos # 12/06/2024 0.9 (H)  0.0 - 0.5 K/uL Final    Baso # 12/06/2024 0.09  0.00 - 0.20 K/uL Final    nRBC 12/06/2024 0  0 /100 WBC Final    Gran % 12/06/2024 47.7  38.0 - 73.0 % Final    Lymph % 12/06/2024 30.2  18.0 - 48.0 % Final    Mono % 12/06/2024 9.1  4.0 - 15.0 % Final    Eosinophil % 12/06/2024 11.3 (H)  0.0 - 8.0 % Final    Basophil % 12/06/2024 1.2  0.0 - 1.9 % Final    Differential Method 12/06/2024 Automated   Final    Sodium 12/06/2024 141  136 - 145 mmol/L Final    Potassium 12/06/2024 3.7  3.5 - 5.1 mmol/L Final    Chloride 12/06/2024 105  95 - 110 mmol/L Final    CO2 12/06/2024 30 (H)  23 - 29 mmol/L Final    Glucose 12/06/2024 130 (H)  70 - 110 mg/dL Final    BUN 12/06/2024 9  6 - 20 mg/dL Final    Creatinine 12/06/2024 0.9  0.5 - 1.4 mg/dL Final    Calcium 12/06/2024 9.1  8.7 - 10.5 mg/dL Final    Total Protein 12/06/2024 6.8  6.0 - 8.4 g/dL Final    Albumin 12/06/2024 4.2  3.5 -  5.2 g/dL Final    Total Bilirubin 12/06/2024 0.4  0.1 - 1.0 mg/dL Final    Alkaline Phosphatase 12/06/2024 52 (L)  55 - 135 U/L Final    AST 12/06/2024 16  10 - 40 U/L Final    ALT 12/06/2024 13  10 - 44 U/L Final    eGFR 12/06/2024 >60.0  >60 mL/min/1.73 m^2 Final    Anion Gap 12/06/2024 6 (L)  8 - 16 mmol/L Final    Prothrombin Time 12/06/2024 10.4  9.0 - 12.5 sec Final    INR 12/06/2024 0.9  0.8 - 1.2 Final    TSH 12/06/2024 0.778  0.340 - 5.600 uIU/mL Final    QRS Duration 12/06/2024 76  ms Final    [facility] QTC Calculation 12/06/2024 467  ms Final    Cholesterol 12/06/2024 163  120 - 199 mg/dL Final    Triglycerides 12/06/2024 180 (H)  30 - 150 mg/dL Final    HDL 12/06/2024 47  40 - 75 mg/dL Final    LDL Cholesterol 12/06/2024 80.0  63.0 - 159.0 mg/dL Final    HDL/Cholesterol Ratio 12/06/2024 28.8  20.0 - 50.0 % Final    Total Cholesterol/HDL Ratio 12/06/2024 3.5  2.0 - 5.0 Final    Non-HDL Cholesterol 12/06/2024 116  mg/dL Final    POCT Glucose 12/06/2024 125 (H)  70 - 110 mg/dL Final    POC Creatinine 12/06/2024 1.0  0.5 - 1.4 mg/dL Final    Sample 12/06/2024 VENOUS   Final    Specimen UA 12/06/2024 Urine, Clean Catch   Final    Color, UA 12/06/2024 Colorless (A)  Yellow, Straw, Tracee Final    Appearance, UA 12/06/2024 Clear  Clear Final    pH, UA 12/06/2024 7.0  5.0 - 8.0 Final    Specific Gravity, UA 12/06/2024 >1.030 (A)  1.005 - 1.030 Final    Protein, UA 12/06/2024 Negative  Negative Final    Glucose, UA 12/06/2024 Negative  Negative Final    Ketones, UA 12/06/2024 Negative  Negative Final    Bilirubin (UA) 12/06/2024 Negative  Negative Final    Occult Blood UA 12/06/2024 Negative  Negative Final    Nitrite, UA 12/06/2024 Negative  Negative Final    Urobilinogen, UA 12/06/2024 Negative  Negative EU/dL Final    Leukocytes, UA 12/06/2024 Negative  Negative Final    BSA 12/07/2024 2.01  m2 Final    Velasco's Biplane MOD Ejection Fra* 12/07/2024 54  % Final    A2C EF 12/07/2024 44  % Final    A4C EF 12/07/2024 64   % Final    LVOT stroke volume 12/07/2024 51.6  cm3 Final    LVIDd 12/07/2024 4.1  3.5 - 6.0 cm Final    LV Systolic Volume 12/07/2024 32.20  mL Final    LV Systolic Volume Index 12/07/2024 16.3  mL/m2 Final    LVIDs 12/07/2024 2.9  2.1 - 4.0 cm Final    LV Diastolic Volume 12/07/2024 74.20  mL Final    LV ESV A4C 12/07/2024 135.00  mL Final    LV Diastolic Volume Index 12/07/2024 37.47  mL/m2 Final    LV EDV A2C 12/07/2024 [id number]  mL Final    LV EDV A4C 12/07/2024 62.20  mL Final    Left Ventricular End Systolic Volu* 12/07/2024 32.20  mL Final    Left Ventricular End Diastolic Vol* 12/07/2024 74.20  mL Final    IVS 12/07/2024 1.3 (A)  0.6 - 1.1 cm Final    LVOT diameter 12/07/2024 2.1  cm Final    LVOT area 12/07/2024 3.5  cm2 Final    FS 12/07/2024 29.3  28 - 44 % Final    Left Ventricle Relative Wall Thick* 12/07/2024 0.54  cm Final    PW 12/07/2024 1.1  0.6 - 1.1 cm Final    LV mass 12/07/2024 171.7  g Final    LV Mass Index 12/07/2024 86.7  g/m2 Final    MV Peak E Syed 12/07/2024 1.21  m/s Final    TDI LATERAL 12/07/2024 0.15  m/s Final    TDI SEPTAL 12/07/2024 0.09  m/s Final    E/E' ratio 12/07/2024 10.08  m/s Final    E wave deceleration time 12/07/2024 225.00  msec Final    LV SEPTAL E/E' RATIO 12/07/2024 13.44  m/s Final    LV LATERAL E/E' RATIO 12/07/2024 8.07  m/s Final    LVOT peak syed 12/07/2024 0.9  m/s Final    Left Ventricular Outflow Tract [place]* 12/07/2024 0.60  cm/s Final    Left Ventricular Outflow Tract [place]* 12/07/2024 2.00  mmHg Final    RV S' 12/07/2024 13.40  cm/s Final    TAPSE 12/07/2024 1.42  cm Final    LA size 12/07/2024 4.40  cm Final    RA area length vol 12/07/2024 44.50  mL Final    AV mean gradient 12/07/2024 2.0  mmHg Final    AV peak gradient 12/07/2024 3.2  mmHg Final    Ao peak syed 12/07/2024 0.9  m/s Final    Ao VTI 12/07/2024 17.6  cm Final    LVOT peak VTI 12/07/2024 14.9  cm Final    AV valve area 12/07/2024 2.9  cm² Final    AV Velocity Ratio 12/07/2024 1.00    Final    AV index (prosthetic) 12/07/2024 0.85   Final    ASAF by Velocity Ratio 12/07/2024 3.5  cm² Final    Mr max merritt 12/07/2024 5.17  m/s Final    MV mean gradient 12/07/2024 2  mmHg Final    MV peak gradient 12/07/2024 5  mmHg Final    MV stenosis pressure 1/2 time 12/07/2024 82.00  ms Final    MV valve area p 1/2 method 12/07/2024 2.68  cm2 Final    MV valve area by continuity eq 12/07/2024 1.98  cm2 Final    MV VTI 12/07/2024 26.1  cm Final    PV PEAK VELOCITY 12/07/2024 1.01  m/s Final    PV peak gradient 12/07/2024 4  mmHg Final    Pulmonary Valve Mean Velocity 12/07/2024 0.70  m/s Final    Ao root annulus 12/07/2024 2.90  cm Final    Ascending aorta 12/07/2024 3.40  cm Final    IVC diameter 12/07/2024 1.60  cm Final    Mean e' 12/07/2024 0.12  m/s Final    ZLVIDS 12/07/2024 -1.51   Final    ZLVIDD 12/07/2024 -3.32   Final    LA area A4C 12/07/2024 36.80  cm2 Final    RVDD 12/07/2024 2.10  cm Final    AORTIC VALVE CUSP SEPERATION 12/07/2024 2.00  cm Final    Est. RA pres 12/07/2024 3  mmHg Final    Sodium 12/07/2024 143  136 - 145 mmol/L Final    Potassium 12/07/2024 3.7  3.5 - 5.1 mmol/L Final    Chloride 12/07/2024 104  95 - 110 mmol/L Final    CO2 12/07/2024 33 (H)  23 - 29 mmol/L Final    Glucose 12/07/2024 86  70 - 110 mg/dL Final    BUN 12/07/2024 7  6 - 20 mg/dL Final    Creatinine 12/07/2024 0.9  0.5 - 1.4 mg/dL Final    Calcium 12/07/2024 9.5  8.7 - 10.5 mg/dL Final    Total Protein 12/07/2024 6.5  6.0 - 8.4 g/dL Final    Albumin 12/07/2024 4.0  3.5 - 5.2 g/dL Final    Total Bilirubin 12/07/2024 0.5  0.1 - 1.0 mg/dL Final    Alkaline Phosphatase 12/07/2024 59  55 - 135 U/L Final    AST 12/07/2024 13  10 - 40 U/L Final    ALT 12/07/2024 11  10 - 44 U/L Final    eGFR 12/07/2024 >60.0  >60 mL/min/1.73 m^2 Final    Anion Gap 12/07/2024 6 (L)  8 - 16 mmol/L Final    Magnesium 12/07/2024 2.0  1.6 - 2.6 mg/dL Final    Phosphorus 12/07/2024 2.8  2.7 - 4.5 mg/dL Final    Hemoglobin A1C 12/07/2024 5.5  4.5 -  6.2 % Final    Estimated Avg Glucose 12/07/2024 111  68 - 131 mg/dL Final    WBC 12/07/2024 5.24  3.90 - 12.70 K/uL Final    RBC 12/07/2024 4.79  4.00 - 5.40 M/uL Final    Hemoglobin 12/07/2024 14.8  12.0 - 16.0 g/dL Final    Hematocrit 12/07/2024 45.2  37.0 - 48.5 % Final    MCV 12/07/2024 94  82 - 98 fL Final    MCH 12/07/2024 30.9  27.0 - 31.0 pg Final    MCHC 12/07/2024 32.7  32.0 - 36.0 g/dL Final    RDW 12/07/2024 13.6  11.5 - 14.5 % Final    Platelets 12/07/2024 216  150 - 450 K/uL Final    MPV 12/07/2024 10.5  9.2 - 12.9 fL Final    Immature Granulocytes 12/07/2024 0.2  0.0 - 0.5 % Final    Gran # (ANC) 12/07/2024 2.1  1.8 - 7.7 K/uL Final    Immature Grans (Abs) 12/07/2024 0.01  0.00 - 0.04 K/uL Final    Lymph # 12/07/2024 2.0  1.0 - 4.8 K/uL Final    Mono # 12/07/2024 0.5  0.3 - 1.0 K/uL Final    Eos # 12/07/2024 0.6 (H)  0.0 - 0.5 K/uL Final    Baso # 12/07/2024 0.07  0.00 - 0.20 K/uL Final    nRBC 12/07/2024 0  0 /100 WBC Final    Gran % 12/07/2024 39.9  38.0 - 73.0 % Final    Lymph % 12/07/2024 38.2  18.0 - 48.0 % Final    Mono % 12/07/2024 9.5  4.0 - 15.0 % Final    Eosinophil % 12/07/2024 10.9 (H)  0.0 - 8.0 % Final    Basophil % 12/07/2024 1.3  0.0 - 1.9 % Final    Differential Method 12/07/2024 Automated   Final    Troponin I High Sensitivity 12/07/2024 33.5 (H)  0.0 - 14.9 pg/mL Final    CPK MB 12/07/2024 1.9  0.1 - 6.5 ng/mL Final    aPTT 12/07/2024 32.3 (H)  21.0 - 32.0 sec Final    Prothrombin Time 12/07/2024 10.8  9.0 - 12.5 sec Final    INR 12/07/2024 1.0  0.8 - 1.2 Final   Lab Visit on 10/30/2024   Component Date Value Ref Range Status    Specimen UA 10/30/2024 Urine, Clean Catch   Final    Color, UA 10/30/2024 Yellow  Yellow, Straw, Tracee Final    Appearance, UA 10/30/2024 Clear  Clear Final    pH, UA 10/30/2024 6.0  5.0 - 8.0 Final    Specific Gravity, UA 10/30/2024 1.020  1.005 - 1.030 Final    Protein, UA 10/30/2024 Negative  Negative Final    Glucose, UA 10/30/2024 Negative  Negative  Final    Ketones, UA 10/30/2024 Negative  Negative Final    Bilirubin (UA) 10/30/2024 Negative  Negative Final    Occult Blood UA 10/30/2024 Negative  Negative Final    Nitrite, UA 10/30/2024 Negative  Negative Final    Urobilinogen, UA 10/30/2024 Negative  Negative EU/dL Final    Leukocytes, UA 10/30/2024 Negative  Negative Final         Medications Ordered Prior to Encounter[1]  Past Medical History:   Diagnosis Date    Allergy     Anxiety     Carotid stenosis 2016    Chronic anemia     Chronic kidney disease, stage 3a 2024    COPD (chronic obstructive pulmonary disease)     Depression     Fibromyalgia     Osteoarthritis     Pinched nerve     PTSD (post-traumatic stress disorder)     Tachycardia     Thyroid disease     Torn rotator cuff     Uterine fibroid 10/2016    Vertigo      Past Surgical History:   Procedure Laterality Date     SECTION      HYSTERECTOMY      LAMINECTOMY      TONSILLECTOMY       Past Surgical History:   Procedure Laterality Date     SECTION      HYSTERECTOMY      LAMINECTOMY      TONSILLECTOMY       Family History   Problem Relation Name Age of Onset    Arthritis Mother      Hypertension Mother      COPD Mother      Depression Mother      Arthritis Father      Depression Father      Hypertension Father      Heart disease Father      COPD Father      Heart attack Father      Asthma Father      Heart attack Maternal Grandmother      Cancer Maternal Grandmother      Hypertension Maternal Grandmother      Hypertension Paternal Grandmother      Stroke Paternal Grandmother      Tuberculosis Paternal Grandfather         Marital Status:   Alcohol History:  reports no history of alcohol use.  Tobacco History:  reports that she has never smoked. She has never used smokeless tobacco.  Drug History:  reports current drug use. Drug: Marijuana.    Health Maintenance Topics with due status: Not Due       Topic Last Completion Date    Mammogram 2024    Lipid Panel  "12/06/2024    Hemoglobin A1c (Diabetic Prevention Screening) 02/24/2025     Immunization History   Administered Date(s) Administered    Td (ADULT) 01/01/2014       Review of patient's allergies indicates:   Allergen Reactions    Clindamycin Hives    Pcn [penicillins] Anaphylaxis    Diphenhydramine hcl Hives    Influenza virus vaccines     Oxycodone     Adhesive Rash    Metoprolol Rash     Current Medications[2]    Review of Systems   Constitutional:  Negative for chills and fever.   Respiratory:  Negative for shortness of breath.    Cardiovascular:  Negative for chest pain.   Gastrointestinal:  Negative for change in bowel habit, nausea and vomiting.   Genitourinary:  Negative for hematuria.       OBJECTIVE:      Vitals:    04/14/25 1044   BP: 130/88   BP Location: Right arm   Patient Position: Sitting   Pulse: 80   SpO2: 97%   Weight: 86.5 kg (190 lb 11.2 oz)   Height: 5' 7" (1.702 m)     Physical Exam  Constitutional:       General: She is not in acute distress.     Appearance: She is not ill-appearing or toxic-appearing.   HENT:      Head: Normocephalic and atraumatic.      Mouth/Throat:      Mouth: Mucous membranes are moist.      Pharynx: Uvula midline. No pharyngeal swelling.   Cardiovascular:      Rate and Rhythm: Normal rate. Rhythm irregularly irregular.      Pulses:           Dorsalis pedis pulses are 2+ on the right side and 2+ on the left side.   Pulmonary:      Effort: Pulmonary effort is normal. No tachypnea, bradypnea, accessory muscle usage, prolonged expiration or respiratory distress.      Breath sounds: Normal breath sounds. No stridor. No wheezing, rhonchi or rales.   Abdominal:      General: Bowel sounds are normal. There is no distension.      Palpations: Abdomen is soft.      Tenderness: There is no abdominal tenderness. There is no guarding or rebound.   Feet:      Right foot:      Toenail Condition: Right toenails are ingrown.      Comments: Great toenail on the right foot -- ingrown and has " a mild bleeding   Neurological:      General: No focal deficit present.      Mental Status: She is alert.   Psychiatric:         Mood and Affect: Mood normal.         Behavior: Behavior normal.        Assessment:       1. Essential hypertension    2. Chronic obstructive pulmonary disease, unspecified COPD type    3. Paroxysmal atrial fibrillation    4. Mixed hyperlipidemia    5. SELMA (obstructive sleep apnea)    6. Chronic diastolic heart failure    7. Ingrown right big toenail    8. Encounter for screening for malignant neoplasm of colon    9. Polyp of colon, unspecified part of colon, unspecified type    10. Right foot pain    11. Marijuana use        Plan:       Essential hypertension  -     Microalbumin/Creatinine Ratio, Urine; Future; Expected date: 04/14/2025  -     Discontinue: nebivoloL (BYSTOLIC) 10 MG Tab; Take 2 tablets (20 mg total) by mouth once daily.  -     nebivoloL (BYSTOLIC) 20 mg Tab; Take 1 tablet (20 mg total) by mouth once daily.  Dispense: 90 tablet; Refill: 1  - Patient reports home blood pressure readings of 150/100 or higher despite maximum doses of lisinopril and amlodipine.  - Patient takes clonidine as needed when blood pressure exceeds 150/100.  Plan:  - Will increase nevibolol from 10 mg to 20 mg daily  - Will continue to increase nevibolol dose  - Can also consider switching amlodipine to nifedipine     Chronic obstructive pulmonary disease, unspecified COPD type  -     albuterol (PROVENTIL/VENTOLIN HFA) 90 mcg/actuation inhaler; Inhale 1-2 puffs into the lungs every 6 (six) hours as needed for Wheezing. Rescue  Dispense: 21 g; Refill: 2  -     albuterol-ipratropium (DUO-NEB) 2.5 mg-0.5 mg/3 mL nebulizer solution; Take 3 mLs by nebulization every 6 (six) hours as needed for Wheezing or Shortness of Breath. Rescue  Dispense: 360 mL; Refill: 0  - Stable  - Advised patient to reduce and work towards cessation of regular cannabis usage due to potential health issues.    Paroxysmal atrial  fibrillation  - Assessed atrial fibrillation management.  - Confirmed patient is currently back on Eliquis for atrial fibrillation management.  - Atrial fibrillation with irregular rhythm detected during physical exam.    Mixed hyperlipidemia  - Stable  - Continue with atorvastatin     SELMA (obstructive sleep apnea)  - Stable  - Continue to monitor     Chronic diastolic heart failure  - Echo EF 70% (2021)  - Continue to monitor    Ingrown right big toenail  -     Ambulatory referral/consult to Podiatry; Future; Expected date: 04/21/2025  - Confirmed presence of painful ingrown toenail on right foot.  - Advised against self-treatment due to infection risk.  - Instructed patient to clean the area thoroughly and apply topical antibiotic cream (which patient already has).  - Referred to podiatry for evaluation and treatment.    Encounter for screening for malignant neoplasm of colon  -     Case Request Endoscopy: COLONOSCOPY  - Patient states that she has difficulty waking up from anesthesia   - This information was included in the order. I also had reached out to GI doctor (Dr. Sanchez) that patient has difficulty waking up from general anes and may need less sedation     Polyp of colon, unspecified part of colon, unspecified type  -     Case Request Endoscopy: COLONOSCOPY    Right foot pain  - Patient reports persistent pain in right foot for 2 years after dropping a can of green beans on it.  - Examination revealed tenderness in the affected area, which patient reports was recently purple.  - Considering referral to podiatry for evaluation of persistent foot pain.    Marijuana use  - Advised the patient to cut down and work toward cessation     Follow up in 3 months with PA for BP   Follow up with me in 1 year for annual check up     Counseled on age and gender appropriate medical preventative services, including cancer screenings, immunizations, overall nutritional health, need for a consistent exercise regimen and an  overall push towards maintaining a vigorous and active lifestyle.        Mandie Ellison M.D.  4/14/2025    This note was created using "[employer] voice recognition software that occasionally misinterprets phrases or words            [1]   Current Outpatient Medications on File Prior to Visit   Medication Sig Dispense Refill    amLODIPine (NORVASC) 10 MG tablet Take 1 tablet (10 mg total) by mouth once daily. 90 tablet 3    apixaban (ELIQUIS) 5 mg Tab Take 1 tablet (5 mg total) by mouth 2 (two) times daily. 180 tablet 3    atorvastatin (LIPITOR) 40 MG tablet Take 1 tablet (40 mg total) by mouth once daily. 30 tablet 2    azelastine (ASTELIN) 137 mcg (0.1 %) nasal spray 2 sprays into each side of nose before bed every night, may use in AM also 30 mL 11    cloNIDine (CATAPRES) 0.1 MG tablet Take 0.1 mg by mouth daily as needed (for BP > 150/100).      fluticasone propionate (FLONASE) 50 mcg/actuation nasal spray 2 sprays (100 mcg total) by Each Nostril route every evening. 2 sprays into each nostril before bed every night 15.8 mL 11    lisinopriL (PRINIVIL,ZESTRIL) 40 MG tablet Take 1 tablet (40 mg total) by mouth once daily. 90 tablet 3    NYSTOP powder APPLY  POWDER TOPICALLY TWICE DAILY 60 g 0    omeprazole (PRILOSEC) 40 MG capsule Take 1 capsule (40 mg total) by mouth every morning. 30 capsule 11    [DISCONTINUED] albuterol (PROVENTIL/VENTOLIN HFA) 90 mcg/actuation inhaler Inhale 1-2 puffs into the lungs every 6 (six) hours as needed for Wheezing. Rescue 21 g 2    [DISCONTINUED] albuterol-ipratropium (DUO-NEB) 2.5 mg-0.5 mg/3 mL nebulizer solution Take 3 mLs by nebulization every 6 (six) hours as needed for Wheezing or Shortness of Breath. Rescue 360 mL 0    [DISCONTINUED] nebivoloL (BYSTOLIC) 10 MG Tab Take 1 tablet (10 mg total) by mouth once daily. 30 tablet 11     No current facility-administered medications on file prior to visit.   [2]   Current Outpatient Medications:     amLODIPine (NORVASC) 10 MG tablet, Take 1  tablet (10 mg total) by mouth once daily., Disp: 90 tablet, Rfl: 3    apixaban (ELIQUIS) 5 mg Tab, Take 1 tablet (5 mg total) by mouth 2 (two) times daily., Disp: 180 tablet, Rfl: 3    atorvastatin (LIPITOR) 40 MG tablet, Take 1 tablet (40 mg total) by mouth once daily., Disp: 30 tablet, Rfl: 2    azelastine (ASTELIN) 137 mcg (0.1 %) nasal spray, 2 sprays into each side of nose before bed every night, may use in AM also, Disp: 30 mL, Rfl: 11    cloNIDine (CATAPRES) 0.1 MG tablet, Take 0.1 mg by mouth daily as needed (for BP > 150/100)., Disp: , Rfl:     fluticasone propionate (FLONASE) 50 mcg/actuation nasal spray, 2 sprays (100 mcg total) by Each Nostril route every evening. 2 sprays into each nostril before bed every night, Disp: 15.8 mL, Rfl: 11    lisinopriL (PRINIVIL,ZESTRIL) 40 MG tablet, Take 1 tablet (40 mg total) by mouth once daily., Disp: 90 tablet, Rfl: 3    NYSTOP powder, APPLY  POWDER TOPICALLY TWICE DAILY, Disp: 60 g, Rfl: 0    omeprazole (PRILOSEC) 40 MG capsule, Take 1 capsule (40 mg total) by mouth every morning., Disp: 30 capsule, Rfl: 11    albuterol (PROVENTIL/VENTOLIN HFA) 90 mcg/actuation inhaler, Inhale 1-2 puffs into the lungs every 6 (six) hours as needed for Wheezing. Rescue, Disp: 21 g, Rfl: 2    albuterol-ipratropium (DUO-NEB) 2.5 mg-0.5 mg/3 mL nebulizer solution, Take 3 mLs by nebulization every 6 (six) hours as needed for Wheezing or Shortness of Breath. Rescue, Disp: 360 mL, Rfl: 0    nebivoloL (BYSTOLIC) 20 mg Tab, Take 1 tablet (20 mg total) by mouth once daily., Disp: 90 tablet, Rfl: 1

## 2025-04-14 ENCOUNTER — OFFICE VISIT (OUTPATIENT)
Dept: FAMILY MEDICINE | Facility: CLINIC | Age: 60
End: 2025-04-14
Payer: MEDICARE

## 2025-04-14 ENCOUNTER — TELEPHONE (OUTPATIENT)
Dept: PODIATRY | Facility: CLINIC | Age: 60
End: 2025-04-14
Payer: MEDICARE

## 2025-04-14 VITALS
WEIGHT: 190.69 LBS | HEIGHT: 67 IN | HEART RATE: 80 BPM | SYSTOLIC BLOOD PRESSURE: 130 MMHG | OXYGEN SATURATION: 97 % | DIASTOLIC BLOOD PRESSURE: 88 MMHG | BODY MASS INDEX: 29.93 KG/M2

## 2025-04-14 DIAGNOSIS — L60.0 INGROWN RIGHT BIG TOENAIL: ICD-10-CM

## 2025-04-14 DIAGNOSIS — I50.32 CHRONIC DIASTOLIC HEART FAILURE: ICD-10-CM

## 2025-04-14 DIAGNOSIS — F12.90 MARIJUANA USE: ICD-10-CM

## 2025-04-14 DIAGNOSIS — J44.9 CHRONIC OBSTRUCTIVE PULMONARY DISEASE, UNSPECIFIED COPD TYPE: ICD-10-CM

## 2025-04-14 DIAGNOSIS — I10 ESSENTIAL HYPERTENSION: Primary | ICD-10-CM

## 2025-04-14 DIAGNOSIS — I48.0 PAROXYSMAL ATRIAL FIBRILLATION: ICD-10-CM

## 2025-04-14 DIAGNOSIS — Z12.11 ENCOUNTER FOR SCREENING FOR MALIGNANT NEOPLASM OF COLON: ICD-10-CM

## 2025-04-14 DIAGNOSIS — E78.2 MIXED HYPERLIPIDEMIA: ICD-10-CM

## 2025-04-14 DIAGNOSIS — M79.671 RIGHT FOOT PAIN: ICD-10-CM

## 2025-04-14 DIAGNOSIS — G47.33 OSA (OBSTRUCTIVE SLEEP APNEA): ICD-10-CM

## 2025-04-14 DIAGNOSIS — K63.5 POLYP OF COLON, UNSPECIFIED PART OF COLON, UNSPECIFIED TYPE: ICD-10-CM

## 2025-04-14 PROCEDURE — 3075F SYST BP GE 130 - 139MM HG: CPT | Mod: CPTII,S$GLB,, | Performed by: STUDENT IN AN ORGANIZED HEALTH CARE EDUCATION/TRAINING PROGRAM

## 2025-04-14 PROCEDURE — G2211 COMPLEX E/M VISIT ADD ON: HCPCS | Mod: S$GLB,,, | Performed by: STUDENT IN AN ORGANIZED HEALTH CARE EDUCATION/TRAINING PROGRAM

## 2025-04-14 PROCEDURE — 3044F HG A1C LEVEL LT 7.0%: CPT | Mod: CPTII,S$GLB,, | Performed by: STUDENT IN AN ORGANIZED HEALTH CARE EDUCATION/TRAINING PROGRAM

## 2025-04-14 PROCEDURE — 1160F RVW MEDS BY RX/DR IN RCRD: CPT | Mod: CPTII,S$GLB,, | Performed by: STUDENT IN AN ORGANIZED HEALTH CARE EDUCATION/TRAINING PROGRAM

## 2025-04-14 PROCEDURE — 3079F DIAST BP 80-89 MM HG: CPT | Mod: CPTII,S$GLB,, | Performed by: STUDENT IN AN ORGANIZED HEALTH CARE EDUCATION/TRAINING PROGRAM

## 2025-04-14 PROCEDURE — 1159F MED LIST DOCD IN RCRD: CPT | Mod: CPTII,S$GLB,, | Performed by: STUDENT IN AN ORGANIZED HEALTH CARE EDUCATION/TRAINING PROGRAM

## 2025-04-14 PROCEDURE — 99999 PR PBB SHADOW E&M-EST. PATIENT-LVL IV: CPT | Mod: PBBFAC,,, | Performed by: STUDENT IN AN ORGANIZED HEALTH CARE EDUCATION/TRAINING PROGRAM

## 2025-04-14 PROCEDURE — 4010F ACE/ARB THERAPY RXD/TAKEN: CPT | Mod: CPTII,S$GLB,, | Performed by: STUDENT IN AN ORGANIZED HEALTH CARE EDUCATION/TRAINING PROGRAM

## 2025-04-14 PROCEDURE — 99214 OFFICE O/P EST MOD 30 MIN: CPT | Mod: S$GLB,,, | Performed by: STUDENT IN AN ORGANIZED HEALTH CARE EDUCATION/TRAINING PROGRAM

## 2025-04-14 PROCEDURE — 3008F BODY MASS INDEX DOCD: CPT | Mod: CPTII,S$GLB,, | Performed by: STUDENT IN AN ORGANIZED HEALTH CARE EDUCATION/TRAINING PROGRAM

## 2025-04-14 RX ORDER — IPRATROPIUM BROMIDE AND ALBUTEROL SULFATE 2.5; .5 MG/3ML; MG/3ML
3 SOLUTION RESPIRATORY (INHALATION) EVERY 6 HOURS PRN
Qty: 360 ML | Refills: 0 | Status: SHIPPED | OUTPATIENT
Start: 2025-04-14 | End: 2026-04-14

## 2025-04-14 RX ORDER — NEBIVOLOL 20 MG/1
20 TABLET ORAL DAILY
Qty: 90 TABLET | Refills: 1
Start: 2025-04-14 | End: 2026-04-14

## 2025-04-14 RX ORDER — ALBUTEROL SULFATE 90 UG/1
1-2 INHALANT RESPIRATORY (INHALATION) EVERY 6 HOURS PRN
Qty: 21 G | Refills: 2 | Status: SHIPPED | OUTPATIENT
Start: 2025-04-14

## 2025-04-14 RX ORDER — CLONIDINE HYDROCHLORIDE 0.1 MG/1
0.1 TABLET ORAL DAILY PRN
COMMUNITY

## 2025-04-14 RX ORDER — NEBIVOLOL 10 MG/1
20 TABLET ORAL DAILY
Start: 2025-04-14 | End: 2025-04-14 | Stop reason: SDUPTHER

## 2025-04-14 NOTE — TELEPHONE ENCOUNTER
----- Message from Belen sent at 4/14/2025  4:53 PM CDT -----  Type:  Sooner Appointment RequestCaller is requesting a sooner appointment.  Caller declined first available appointment listed below.  Caller will not accept being placed on the waitlist and is requesting a message be sent to doctor.Name of Caller:  pt When is the first available appointment?  Denied Symptoms:  referral / infected ingrown Would the patient rather a call back or a response via [facility]? [facility] Call Back Number:  [phone] (home) [phone] (work)Additional Information:  please advise   Post-Care Instructions: I reviewed with the patient in detail post-care instructions. Patient is to wear sunprotection, and avoid picking at any of the treated lesions. Pt may apply Vaseline to crusted or scabbing areas. Price (Use Numbers Only, No Special Characters Or $): 010 Anesthesia Volume In Cc: 0 Detail Level: Zone Consent: The patient's consent was obtained including but not limited to risks of crusting, scabbing, blistering, scarring, darker or lighter pigmentary change, recurrence, incomplete removal and infection.

## 2025-04-15 ENCOUNTER — HOSPITAL ENCOUNTER (OUTPATIENT)
Dept: RADIOLOGY | Facility: CLINIC | Age: 60
Discharge: HOME | End: 2025-04-15
Payer: MEDICARE

## 2025-04-15 ENCOUNTER — PATIENT MESSAGE (OUTPATIENT)
Dept: GASTROENTEROLOGY | Facility: CLINIC | Age: 60
End: 2025-04-15
Payer: MEDICARE

## 2025-04-15 ENCOUNTER — PATIENT MESSAGE (OUTPATIENT)
Dept: FAMILY MEDICINE | Facility: CLINIC | Age: 60
End: 2025-04-15
Payer: MEDICARE

## 2025-04-15 ENCOUNTER — OFFICE VISIT (OUTPATIENT)
Dept: PODIATRY | Facility: CLINIC | Age: 60
End: 2025-04-15
Payer: MEDICARE

## 2025-04-15 VITALS — WEIGHT: 190.69 LBS | HEIGHT: 67 IN | BODY MASS INDEX: 29.93 KG/M2 | RESPIRATION RATE: 16 BRPM

## 2025-04-15 DIAGNOSIS — L03.031 PARONYCHIA OF GREAT TOE OF RIGHT FOOT: Primary | ICD-10-CM

## 2025-04-15 DIAGNOSIS — L60.0 INGROWN RIGHT BIG TOENAIL: ICD-10-CM

## 2025-04-15 DIAGNOSIS — L60.3 DYSTROPHY OF NAIL DUE TO TRAUMA: ICD-10-CM

## 2025-04-15 DIAGNOSIS — G89.29 CHRONIC FOOT PAIN, RIGHT: ICD-10-CM

## 2025-04-15 DIAGNOSIS — D23.71 BENIGN NEOPLASM OF SKIN OF RIGHT LOWER EXTREMITY, INCLUDING HIP: ICD-10-CM

## 2025-04-15 DIAGNOSIS — B35.1 ONYCHOMYCOSIS DUE TO DERMATOPHYTE: ICD-10-CM

## 2025-04-15 DIAGNOSIS — M79.671 CHRONIC FOOT PAIN, RIGHT: ICD-10-CM

## 2025-04-15 DIAGNOSIS — M79.671 RIGHT FOOT PAIN: Chronic | ICD-10-CM

## 2025-04-15 DIAGNOSIS — G89.29 CHRONIC TOE PAIN, RIGHT FOOT: ICD-10-CM

## 2025-04-15 DIAGNOSIS — M19.071 ARTHRITIS OF RIGHT FOOT: ICD-10-CM

## 2025-04-15 DIAGNOSIS — M79.674 CHRONIC TOE PAIN, RIGHT FOOT: ICD-10-CM

## 2025-04-15 DIAGNOSIS — L03.031 CELLULITIS OF GREAT TOE OF RIGHT FOOT: ICD-10-CM

## 2025-04-15 DIAGNOSIS — Z76.0 MEDICATION REFILL: ICD-10-CM

## 2025-04-15 DIAGNOSIS — L84 CORN OR CALLUS: ICD-10-CM

## 2025-04-15 PROCEDURE — 3044F HG A1C LEVEL LT 7.0%: CPT | Mod: CPTII,S$GLB,,

## 2025-04-15 PROCEDURE — 3008F BODY MASS INDEX DOCD: CPT | Mod: CPTII,S$GLB,,

## 2025-04-15 PROCEDURE — 1160F RVW MEDS BY RX/DR IN RCRD: CPT | Mod: CPTII,S$GLB,,

## 2025-04-15 PROCEDURE — 73630 X-RAY EXAM OF FOOT: CPT | Mod: RT,S$GLB,, | Performed by: RADIOLOGY

## 2025-04-15 PROCEDURE — 11750 EXCISION NAIL&NAIL MATRIX: CPT | Mod: T5,S$GLB,,

## 2025-04-15 PROCEDURE — 99204 OFFICE O/P NEW MOD 45 MIN: CPT | Mod: 25,S$GLB,,

## 2025-04-15 PROCEDURE — 17110 DESTRUCTION B9 LES UP TO 14: CPT | Mod: 51,S$GLB,,

## 2025-04-15 PROCEDURE — 99999 PR PBB SHADOW E&M-EST. PATIENT-LVL III: CPT | Mod: PBBFAC,,,

## 2025-04-15 PROCEDURE — 1159F MED LIST DOCD IN RCRD: CPT | Mod: CPTII,S$GLB,,

## 2025-04-15 PROCEDURE — 4010F ACE/ARB THERAPY RXD/TAKEN: CPT | Mod: CPTII,S$GLB,,

## 2025-04-15 RX ORDER — NYSTATIN 100000 [USP'U]/G
POWDER TOPICAL 2 TIMES DAILY
Qty: 60 G | Refills: 0 | Status: SHIPPED | OUTPATIENT
Start: 2025-04-15

## 2025-04-15 RX ORDER — DOXYCYCLINE 100 MG/1
100 CAPSULE ORAL EVERY 12 HOURS
Qty: 28 CAPSULE | Refills: 0 | Status: SHIPPED | OUTPATIENT
Start: 2025-04-15 | End: 2025-04-29
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
## 2025-04-16 DIAGNOSIS — E78.2 MIXED HYPERLIPIDEMIA: ICD-10-CM

## 2025-04-16 RX ORDER — ATORVASTATIN CALCIUM 40 MG/1
40 TABLET, FILM COATED ORAL
Qty: 30 TABLET | Refills: 0 | Status: SHIPPED | OUTPATIENT
Start: 2025-04-16

## 2025-05-03 DIAGNOSIS — J44.9 CHRONIC OBSTRUCTIVE PULMONARY DISEASE, UNSPECIFIED COPD TYPE: ICD-10-CM

## 2025-05-03 RX ORDER — ALBUTEROL SULFATE 90 UG/1
1-2 INHALANT RESPIRATORY (INHALATION) EVERY 6 HOURS PRN
Qty: 21 G | Refills: 2 | Status: CANCELLED | OUTPATIENT
Start: 2025-05-03

## 2025-05-08 ENCOUNTER — TELEPHONE (OUTPATIENT)
Dept: OPTOMETRY | Facility: CLINIC | Age: 60
End: 2025-05-08
Payer: MEDICARE

## 2025-05-18 DIAGNOSIS — E78.2 MIXED HYPERLIPIDEMIA: ICD-10-CM

## 2025-05-19 RX ORDER — ATORVASTATIN CALCIUM 40 MG/1
40 TABLET, FILM COATED ORAL
Qty: 30 TABLET | Refills: 3 | Status: SHIPPED | OUTPATIENT
Start: 2025-05-19

## 2025-05-22 ENCOUNTER — OFFICE VISIT (OUTPATIENT)
Dept: HEMATOLOGY/ONCOLOGY | Facility: CLINIC | Age: 60
End: 2025-05-22
Payer: MEDICARE

## 2025-05-22 VITALS
SYSTOLIC BLOOD PRESSURE: 146 MMHG | DIASTOLIC BLOOD PRESSURE: 74 MMHG | OXYGEN SATURATION: 96 % | BODY MASS INDEX: 29.07 KG/M2 | HEART RATE: 84 BPM | TEMPERATURE: 97 F | RESPIRATION RATE: 18 BRPM | HEIGHT: 67 IN | WEIGHT: 185.19 LBS

## 2025-05-22 DIAGNOSIS — D50.0 IRON DEFICIENCY ANEMIA DUE TO CHRONIC BLOOD LOSS: ICD-10-CM

## 2025-05-22 DIAGNOSIS — D53.9 NUTRITIONAL ANEMIA, UNSPECIFIED: Primary | ICD-10-CM

## 2025-05-22 PROCEDURE — 3044F HG A1C LEVEL LT 7.0%: CPT | Mod: CPTII,S$GLB,, | Performed by: NURSE PRACTITIONER

## 2025-05-22 PROCEDURE — 99999 PR PBB SHADOW E&M-EST. PATIENT-LVL IV: CPT | Mod: PBBFAC,,, | Performed by: NURSE PRACTITIONER

## 2025-05-22 PROCEDURE — 3077F SYST BP >= 140 MM HG: CPT | Mod: CPTII,S$GLB,, | Performed by: NURSE PRACTITIONER

## 2025-05-22 PROCEDURE — 99214 OFFICE O/P EST MOD 30 MIN: CPT | Mod: S$GLB,,, | Performed by: NURSE PRACTITIONER

## 2025-05-22 PROCEDURE — 4010F ACE/ARB THERAPY RXD/TAKEN: CPT | Mod: CPTII,S$GLB,, | Performed by: NURSE PRACTITIONER

## 2025-05-22 PROCEDURE — 3078F DIAST BP <80 MM HG: CPT | Mod: CPTII,S$GLB,, | Performed by: NURSE PRACTITIONER

## 2025-05-22 PROCEDURE — 3008F BODY MASS INDEX DOCD: CPT | Mod: CPTII,S$GLB,, | Performed by: NURSE PRACTITIONER

## 2025-05-22 PROCEDURE — 1159F MED LIST DOCD IN RCRD: CPT | Mod: CPTII,S$GLB,, | Performed by: NURSE PRACTITIONER

## 2025-05-22 RX ORDER — CHOLECALCIFEROL (VITAMIN D3) 25 MCG
1000 TABLET,CHEWABLE ORAL DAILY
Qty: 90 LOZENGE | Refills: 1 | Status: SHIPPED | OUTPATIENT
Start: 2025-05-22
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
## 2025-06-26 DIAGNOSIS — Z76.0 MEDICATION REFILL: ICD-10-CM

## 2025-06-26 RX ORDER — NYSTATIN 100000 [USP'U]/G
POWDER TOPICAL 2 TIMES DAILY
Qty: 60 G | Refills: 0 | Status: SHIPPED | OUTPATIENT
Start: 2025-06-26

## 2025-07-03 DIAGNOSIS — I10 ESSENTIAL HYPERTENSION: ICD-10-CM

## 2025-07-03 DIAGNOSIS — J44.9 CHRONIC OBSTRUCTIVE PULMONARY DISEASE, UNSPECIFIED COPD TYPE: ICD-10-CM

## 2025-07-03 RX ORDER — LISINOPRIL 40 MG/1
40 TABLET ORAL DAILY
Qty: 90 TABLET | Refills: 3 | Status: SHIPPED | OUTPATIENT
Start: 2025-07-03 | End: 2026-07-03

## 2025-07-03 RX ORDER — AMLODIPINE BESYLATE 10 MG/1
10 TABLET ORAL DAILY
Qty: 90 TABLET | Refills: 3 | Status: SHIPPED | OUTPATIENT
Start: 2025-07-03 | End: 2026-07-03

## 2025-07-07 RX ORDER — LISINOPRIL 40 MG/1
40 TABLET ORAL DAILY
Qty: 90 TABLET | Refills: 3 | Status: SHIPPED | OUTPATIENT
Start: 2025-07-07 | End: 2026-07-07

## 2025-07-07 RX ORDER — ALBUTEROL SULFATE 90 UG/1
1-2 INHALANT RESPIRATORY (INHALATION) EVERY 6 HOURS PRN
Qty: 21 G | Refills: 2 | Status: SHIPPED | OUTPATIENT
Start: 2025-07-07

## 2025-07-07 RX ORDER — IPRATROPIUM BROMIDE AND ALBUTEROL SULFATE 2.5; .5 MG/3ML; MG/3ML
3 SOLUTION RESPIRATORY (INHALATION) EVERY 6 HOURS PRN
Qty: 360 ML | Refills: 0 | Status: SHIPPED | OUTPATIENT
Start: 2025-07-07 | End: 2026-07-07

## 2025-07-07 RX ORDER — AMLODIPINE BESYLATE 10 MG/1
10 TABLET ORAL DAILY
Qty: 90 TABLET | Refills: 3 | Status: SHIPPED | OUTPATIENT
Start: 2025-07-07 | End: 2026-07-07

## 2025-07-08 RX ORDER — AZELASTINE 1 MG/ML
SPRAY, METERED NASAL
Qty: 30 ML | Refills: 11 | Status: SHIPPED | OUTPATIENT
Start: 2025-07-08

## 2025-07-08 RX ORDER — FLUTICASONE PROPIONATE 50 MCG
2 SPRAY, SUSPENSION (ML) NASAL NIGHTLY
Qty: 15.8 ML | Refills: 11 | Status: SHIPPED | OUTPATIENT
Start: 2025-07-08 | End: 2026-07-08

## 2025-07-08 RX ORDER — OMEPRAZOLE 40 MG/1
40 CAPSULE, DELAYED RELEASE ORAL EVERY MORNING
Qty: 30 CAPSULE | Refills: 11 | Status: SHIPPED | OUTPATIENT
Start: 2025-07-08 | End: 2026-07-08

## 2025-07-09 ENCOUNTER — TELEPHONE (OUTPATIENT)
Dept: GASTROENTEROLOGY | Facility: CLINIC | Age: 60
End: 2025-07-09
Payer: MEDICARE

## 2025-07-31 ENCOUNTER — PATIENT MESSAGE (OUTPATIENT)
Dept: GASTROENTEROLOGY | Facility: CLINIC | Age: 60
End: 2025-07-31
Payer: MEDICARE

## 2025-08-06 ENCOUNTER — PATIENT OUTREACH (OUTPATIENT)
Dept: ADMINISTRATIVE | Facility: HOSPITAL | Age: 60
End: 2025-08-06
Payer: MEDICARE

## 2025-08-06 VITALS — SYSTOLIC BLOOD PRESSURE: 139 MMHG | DIASTOLIC BLOOD PRESSURE: 82 MMHG

## 2025-08-06 DIAGNOSIS — J44.9 CHRONIC OBSTRUCTIVE PULMONARY DISEASE, UNSPECIFIED COPD TYPE: ICD-10-CM

## 2025-08-06 RX ORDER — IPRATROPIUM BROMIDE AND ALBUTEROL SULFATE 2.5; .5 MG/3ML; MG/3ML
3 SOLUTION RESPIRATORY (INHALATION) EVERY 6 HOURS PRN
Qty: 360 ML | Refills: 0 | Status: SHIPPED | OUTPATIENT
Start: 2025-08-06 | End: 2026-08-06

## 2025-08-06 RX ORDER — ALBUTEROL SULFATE 90 UG/1
1-2 INHALANT RESPIRATORY (INHALATION) EVERY 6 HOURS PRN
Qty: 21 G | Refills: 2 | Status: SHIPPED | OUTPATIENT
Start: 2025-08-06

## 2025-08-06 NOTE — PROGRESS NOTES
139/82    Htn payor report    Martita San 59 y.o. female     Review of patient's allergies indicates:   Allergen Reactions    Clindamycin Hives    Pcn [penicillins] Anaphylaxis    Diphenhydramine hcl Hives    Influenza virus vaccines     Oxycodone     Adhesive Rash    Metoprolol Rash         Patient was instructed to you sit upright with both feet flat on the floor for five minutes without any distractions/interruptions (no television, no talking, etc.). After five minutes of sitting, measure their blood pressure. If their blood pressure was over 140/90.  They were asked to wait 15 minutes and retake in the other arm.       REMOTE HOME BLOOD PRESSURE READING DOCUMENTED WITH TWO MOST RECENT IN CLINIC BLOOD PRESSURE READINGS.          BP Readings from Last 3 Encounters:   05/22/25 (!) 146/74   04/14/25 130/88   02/06/25 (!) 147/95

## 2025-08-11 ENCOUNTER — TELEPHONE (OUTPATIENT)
Dept: CARDIOLOGY | Facility: CLINIC | Age: 60
End: 2025-08-11
Payer: MEDICARE

## 2025-08-11 ENCOUNTER — TELEPHONE (OUTPATIENT)
Dept: GASTROENTEROLOGY | Facility: CLINIC | Age: 60
End: 2025-08-11
Payer: MEDICARE

## 2025-08-11 DIAGNOSIS — Z86.0100 HISTORY OF COLON POLYPS: Primary | ICD-10-CM
